# Patient Record
Sex: FEMALE | Race: WHITE | NOT HISPANIC OR LATINO | Employment: UNEMPLOYED | URBAN - METROPOLITAN AREA
[De-identification: names, ages, dates, MRNs, and addresses within clinical notes are randomized per-mention and may not be internally consistent; named-entity substitution may affect disease eponyms.]

---

## 2017-05-01 ENCOUNTER — TRANSFERRED RECORDS (OUTPATIENT)
Dept: HEALTH INFORMATION MANAGEMENT | Facility: CLINIC | Age: 47
End: 2017-05-01

## 2017-06-04 ENCOUNTER — TRANSFERRED RECORDS (OUTPATIENT)
Dept: HEALTH INFORMATION MANAGEMENT | Facility: HOSPITAL | Age: 47
End: 2017-06-04

## 2017-06-05 ENCOUNTER — HOSPITAL ENCOUNTER (INPATIENT)
Facility: HOSPITAL | Age: 47
LOS: 3 days | Discharge: HOME | DRG: 881 | End: 2017-06-08
Attending: PSYCHIATRY & NEUROLOGY | Admitting: PSYCHIATRY & NEUROLOGY
Payer: MEDICARE

## 2017-06-05 ENCOUNTER — TRANSFERRED RECORDS (OUTPATIENT)
Dept: HEALTH INFORMATION MANAGEMENT | Facility: HOSPITAL | Age: 47
End: 2017-06-05

## 2017-06-05 DIAGNOSIS — F33.41 RECURRENT MAJOR DEPRESSION IN PARTIAL REMISSION (H): ICD-10-CM

## 2017-06-05 DIAGNOSIS — M79.7 FIBROMYALGIA SYNDROME: ICD-10-CM

## 2017-06-05 DIAGNOSIS — F41.9 ANXIETY: Primary | ICD-10-CM

## 2017-06-05 PROBLEM — R45.851 SUICIDAL IDEATION: Status: ACTIVE | Noted: 2017-06-05

## 2017-06-05 PROCEDURE — A9270 NON-COVERED ITEM OR SERVICE: HCPCS | Mod: GY | Performed by: NURSE PRACTITIONER

## 2017-06-05 PROCEDURE — 12400000 ZZH R&B MH

## 2017-06-05 PROCEDURE — 25000132 ZZH RX MED GY IP 250 OP 250 PS 637: Mod: GY | Performed by: NURSE PRACTITIONER

## 2017-06-05 PROCEDURE — 99223 1ST HOSP IP/OBS HIGH 75: CPT | Mod: AI | Performed by: NURSE PRACTITIONER

## 2017-06-05 PROCEDURE — 25000125 ZZHC RX 250: Performed by: NURSE PRACTITIONER

## 2017-06-05 RX ORDER — ZINC SULFATE 50(220)MG
220 CAPSULE ORAL DAILY
Status: DISCONTINUED | OUTPATIENT
Start: 2017-06-05 | End: 2017-06-08 | Stop reason: HOSPADM

## 2017-06-05 RX ORDER — SUMATRIPTAN 25 MG/1
50 TABLET, FILM COATED ORAL ONCE
Status: DISCONTINUED | OUTPATIENT
Start: 2017-06-05 | End: 2017-06-05 | Stop reason: ALTCHOICE

## 2017-06-05 RX ORDER — LORAZEPAM 0.5 MG/1
0.5 TABLET ORAL AT BEDTIME
Status: DISCONTINUED | OUTPATIENT
Start: 2017-06-05 | End: 2017-06-08 | Stop reason: HOSPADM

## 2017-06-05 RX ORDER — CALCIUM CARBONATE 500 MG/1
1000 TABLET, CHEWABLE ORAL 2 TIMES DAILY WITH MEALS
Status: DISCONTINUED | OUTPATIENT
Start: 2017-06-05 | End: 2017-06-08 | Stop reason: HOSPADM

## 2017-06-05 RX ORDER — OLANZAPINE 10 MG/1
10 TABLET ORAL
Status: DISCONTINUED | OUTPATIENT
Start: 2017-06-05 | End: 2017-06-08 | Stop reason: HOSPADM

## 2017-06-05 RX ORDER — ACETAMINOPHEN 325 MG/1
975 TABLET ORAL EVERY 4 HOURS PRN
Status: DISCONTINUED | OUTPATIENT
Start: 2017-06-05 | End: 2017-06-08 | Stop reason: HOSPADM

## 2017-06-05 RX ORDER — DIPHENHYDRAMINE HCL 25 MG
25-50 CAPSULE ORAL EVERY 6 HOURS PRN
Status: DISCONTINUED | OUTPATIENT
Start: 2017-06-05 | End: 2017-06-07

## 2017-06-05 RX ORDER — HYDROCODONE BITARTRATE AND ACETAMINOPHEN 5; 325 MG/1; MG/1
.5-1 TABLET ORAL EVERY 6 HOURS PRN
Status: DISCONTINUED | OUTPATIENT
Start: 2017-06-05 | End: 2017-06-08 | Stop reason: HOSPADM

## 2017-06-05 RX ORDER — ASCORBIC ACID 500 MG
1000 TABLET ORAL 3 TIMES DAILY
Status: DISCONTINUED | OUTPATIENT
Start: 2017-06-05 | End: 2017-06-08 | Stop reason: HOSPADM

## 2017-06-05 RX ORDER — ALUMINA, MAGNESIA, AND SIMETHICONE 2400; 2400; 240 MG/30ML; MG/30ML; MG/30ML
30 SUSPENSION ORAL EVERY 4 HOURS PRN
Status: DISCONTINUED | OUTPATIENT
Start: 2017-06-05 | End: 2017-06-08 | Stop reason: HOSPADM

## 2017-06-05 RX ORDER — CYCLOBENZAPRINE HCL 5 MG
5 TABLET ORAL
Status: DISCONTINUED | OUTPATIENT
Start: 2017-06-05 | End: 2017-06-08 | Stop reason: HOSPADM

## 2017-06-05 RX ORDER — TRAZODONE HYDROCHLORIDE 50 MG/1
50 TABLET, FILM COATED ORAL
Status: DISCONTINUED | OUTPATIENT
Start: 2017-06-05 | End: 2017-06-08 | Stop reason: HOSPADM

## 2017-06-05 RX ORDER — ONDANSETRON 4 MG/1
8 TABLET, FILM COATED ORAL EVERY 8 HOURS PRN
Status: DISCONTINUED | OUTPATIENT
Start: 2017-06-05 | End: 2017-06-08 | Stop reason: HOSPADM

## 2017-06-05 RX ORDER — HYDROXYZINE HYDROCHLORIDE 25 MG/1
25-50 TABLET, FILM COATED ORAL EVERY 4 HOURS PRN
Status: DISCONTINUED | OUTPATIENT
Start: 2017-06-05 | End: 2017-06-05 | Stop reason: ALTCHOICE

## 2017-06-05 RX ORDER — OLANZAPINE 10 MG/2ML
10 INJECTION, POWDER, FOR SOLUTION INTRAMUSCULAR
Status: DISCONTINUED | OUTPATIENT
Start: 2017-06-05 | End: 2017-06-08 | Stop reason: HOSPADM

## 2017-06-05 RX ORDER — ONDANSETRON 8 MG/1
24 TABLET, FILM COATED ORAL EVERY 8 HOURS PRN
Status: DISCONTINUED | OUTPATIENT
Start: 2017-06-05 | End: 2017-06-05

## 2017-06-05 RX ORDER — POTASSIUM CHLORIDE 1500 MG/1
20 TABLET, EXTENDED RELEASE ORAL 2 TIMES DAILY
Status: DISCONTINUED | OUTPATIENT
Start: 2017-06-05 | End: 2017-06-08 | Stop reason: HOSPADM

## 2017-06-05 RX ORDER — LIDOCAINE 50 MG/G
1 PATCH TOPICAL EVERY 24 HOURS
Status: DISCONTINUED | OUTPATIENT
Start: 2017-06-05 | End: 2017-06-08 | Stop reason: HOSPADM

## 2017-06-05 RX ORDER — ACETAMINOPHEN 325 MG/1
650 TABLET ORAL EVERY 4 HOURS PRN
Status: DISCONTINUED | OUTPATIENT
Start: 2017-06-05 | End: 2017-06-05

## 2017-06-05 RX ORDER — PHENOL 1.4 %
1 AEROSOL, SPRAY (ML) MUCOUS MEMBRANE 2 TIMES DAILY WITH MEALS
COMMUNITY

## 2017-06-05 RX ORDER — DULOXETIN HYDROCHLORIDE 30 MG/1
30 CAPSULE, DELAYED RELEASE ORAL DAILY
Status: DISCONTINUED | OUTPATIENT
Start: 2017-06-05 | End: 2017-06-07

## 2017-06-05 RX ORDER — NALOXONE HYDROCHLORIDE 0.4 MG/ML
.1-.4 INJECTION, SOLUTION INTRAMUSCULAR; INTRAVENOUS; SUBCUTANEOUS
Status: DISCONTINUED | OUTPATIENT
Start: 2017-06-05 | End: 2017-06-08 | Stop reason: HOSPADM

## 2017-06-05 RX ORDER — POLYETHYLENE GLYCOL 3350 17 G/17G
17 POWDER, FOR SOLUTION ORAL 4 TIMES DAILY PRN
Status: DISCONTINUED | OUTPATIENT
Start: 2017-06-05 | End: 2017-06-08 | Stop reason: HOSPADM

## 2017-06-05 RX ORDER — SUMATRIPTAN 25 MG/1
50 TABLET, FILM COATED ORAL DAILY PRN
Status: DISCONTINUED | OUTPATIENT
Start: 2017-06-05 | End: 2017-06-08 | Stop reason: HOSPADM

## 2017-06-05 RX ORDER — LACTOSE-REDUCED FOOD
LIQUID (ML) ORAL
COMMUNITY

## 2017-06-05 RX ORDER — LACTOSE-REDUCED FOOD
1 LIQUID (ML) ORAL
Status: DISCONTINUED | OUTPATIENT
Start: 2017-06-05 | End: 2017-06-05

## 2017-06-05 RX ORDER — UREA 10 %
500 LOTION (ML) TOPICAL
COMMUNITY

## 2017-06-05 RX ORDER — ARIPIPRAZOLE 2 MG/1
1 TABLET ORAL DAILY
Status: ON HOLD | COMMUNITY
End: 2017-06-08

## 2017-06-05 RX ADMIN — MAGNESIUM HYDROXIDE 30 ML: 400 SUSPENSION ORAL at 16:54

## 2017-06-05 RX ADMIN — HYDROCODONE BITARTRATE AND ACETAMINOPHEN 1 TABLET: 5; 325 TABLET ORAL at 16:54

## 2017-06-05 RX ADMIN — CYCLOBENZAPRINE HYDROCHLORIDE 5 MG: 5 TABLET, FILM COATED ORAL at 20:57

## 2017-06-05 RX ADMIN — POTASSIUM CHLORIDE 20 MEQ: 20 TABLET, EXTENDED RELEASE ORAL at 14:16

## 2017-06-05 RX ADMIN — Medication 500 MCG: at 17:25

## 2017-06-05 RX ADMIN — CALCIUM CARBONATE (ANTACID) CHEW TAB 500 MG 1000 MG: 500 CHEW TAB at 17:21

## 2017-06-05 RX ADMIN — ONDANSETRON HYDROCHLORIDE 8 MG: 4 TABLET, FILM COATED ORAL at 18:33

## 2017-06-05 RX ADMIN — RANITIDINE HYDROCHLORIDE 150 MG: 150 TABLET, FILM COATED ORAL at 20:52

## 2017-06-05 RX ADMIN — LORAZEPAM 0.5 MG: 0.5 TABLET ORAL at 20:52

## 2017-06-05 RX ADMIN — DIPHENHYDRAMINE HYDROCHLORIDE 50 MG: 25 CAPSULE ORAL at 16:54

## 2017-06-05 RX ADMIN — ACETAMINOPHEN 975 MG: 325 TABLET, FILM COATED ORAL at 14:23

## 2017-06-05 RX ADMIN — CHOLECALCIFEROL CAP 125 MCG (5000 UNIT) 5000 UNITS: 125 CAP at 14:16

## 2017-06-05 RX ADMIN — OXYCODONE HYDROCHLORIDE AND ACETAMINOPHEN 1000 MG: 500 TABLET ORAL at 20:52

## 2017-06-05 RX ADMIN — LIDOCAINE 1 PATCH: 50 PATCH TOPICAL at 14:15

## 2017-06-05 RX ADMIN — OXYCODONE HYDROCHLORIDE AND ACETAMINOPHEN 1000 MG: 500 TABLET ORAL at 17:20

## 2017-06-05 RX ADMIN — RANITIDINE HYDROCHLORIDE 150 MG: 150 TABLET, FILM COATED ORAL at 14:17

## 2017-06-05 RX ADMIN — DULOXETINE HYDROCHLORIDE 30 MG: 30 CAPSULE, DELAYED RELEASE ORAL at 14:16

## 2017-06-05 RX ADMIN — POTASSIUM CHLORIDE 20 MEQ: 20 TABLET, EXTENDED RELEASE ORAL at 20:52

## 2017-06-05 RX ADMIN — ZINC SULFATE CAP 220 MG (50 MG ELEMENTAL ZN) 220 MG: 220 (50 ZN) CAP at 14:16

## 2017-06-05 ASSESSMENT — ACTIVITIES OF DAILY LIVING (ADL)
TOILETING: 0-->INDEPENDENT
GROOMING: INDEPENDENT
AMBULATION: 0-->INDEPENDENT
BATHING: 0-->INDEPENDENT
RETIRED_COMMUNICATION: 0-->UNDERSTANDS/COMMUNICATES WITHOUT DIFFICULTY
AMBULATION: 0-->INDEPENDENT
EATING: 0-->INDEPENDENT
DRESS: SCRUBS (BEHAVIORAL HEALTH);INDEPENDENT
BATHING: 0-->INDEPENDENT
ORAL_HYGIENE: INDEPENDENT
DRESS: INDEPENDENT;SCRUBS (BEHAVIORAL HEALTH)
FALL_HISTORY_WITHIN_LAST_SIX_MONTHS: NO
TRANSFERRING: 0-->INDEPENDENT
RETIRED_EATING: 0-->INDEPENDENT
COGNITION: 0 - NO COGNITION ISSUES REPORTED
DRESS: 0-->INDEPENDENT
SWALLOWING: 0-->SWALLOWS FOODS/LIQUIDS WITHOUT DIFFICULTY
CHANGE_IN_FUNCTIONAL_STATUS_SINCE_ONSET_OF_CURRENT_ILLNESS/INJURY: NO
COMMUNICATION: 0-->UNDERSTANDS/COMMUNICATES WITHOUT DIFFICULTY
TRANSFERRING: 0-->INDEPENDENT
GROOMING: INDEPENDENT;SHOWER
SWALLOWING: 0-->SWALLOWS FOODS/LIQUIDS WITHOUT DIFFICULTY
TOILETING: 0-->INDEPENDENT
DRESS: 0-->INDEPENDENT

## 2017-06-05 NOTE — PLAN OF CARE
Problem: Goal Outcome Summary  Goal: Goal Outcome Summary  Outcome: No Change  Patient admits depression, SI with a plan but will not share her plan. Denies HI, anxiety, hallucinations or delusions at this time. She states that she has chronic pain d/t her Crohn's disease as it is an autoimmune disease. She was tearful when she was talking about being depressed and her SI plan. Writer talked with her for a while and she stated that our talk helped and that she felt better. She stated that she was excited to be involved with our groups so she can learn about how to cope with depression and SI. Patient was calm, cooperative and thankful during admission. She picked out a book and got some ice water and then went back into her room.       Tatyana Dey  6/5/2017  7:43 AM

## 2017-06-05 NOTE — IP AVS SNAPSHOT
MRN:[id number]                      After Visit Summary   6/5/2017    Piper Yoder    MRN: [id number]           Thank you!     Thank you for choosing [facility] for your care. Our goal is always to provide you with excellent care. Hearing back from our patients is one way we can continue to improve our services. Please take a few minutes to complete the written survey that you may receive in the mail after you visit with us. Thank you!        Patient Information     Date Of Birth          1970        Designated Caregiver       Most Recent Value    Caregiver    Will someone help with your care after discharge? no      About your hospital stay     You were admitted on:  June 5, 2017 You last received care in the:  [facility]    You were discharged on:  June 8, 2017       Who to Call     For medical emergencies, please call 911.  For non-urgent questions about your medical care, please call your primary care provider or clinic, [phone]          Attending Provider     Provider Specialty    Tian Mcdonald MD Psychiatry    Kristin Moore NP Nurse Practitioner       Primary Care Provider Office Phone # Fax #    Ibrahima Nicole [phone]      Further instructions from your care team       Behavioral Discharge Planning and Instructions    Summary: Piper was admitted with depression and suicidal ideation with a plan.    Main Diagnosis: Major Depressive Episode, single episode, severe w/o psychosis, R/O PTSD, Chronic, R/O Personality Disorder, Borderline Features, Crohn's Disease.    Major Treatments, Procedures and Findings: Stabilize with medications, connect with community programs.     Symptoms to Report: feeling more aggressive, increased confusion, losing more sleep, mood getting worse or thoughts of suicide    Lifestyle Adjustment: Take all medications as prescribed, meet with doctor/ medication provider, out patient therapist, , and [facility] worker as  scheduled. Abstain from alcohol or any unprescribed drugs.     Psychiatry Follow-up:    [facility]  Medication Management - Charles FAIRCHILD - June 22, @ 12:40  BLAINE Grant - as arranged.  [address]  [place], [place]  Phone: [phone]  Fax: [phone]     [facility]  Individual Therapy - Starla Barksdale - June 19th, @ 3 pm and June 30th, @ 11 am.  [address] [place] [address]  Phone: [phone] ([facility])  [phone] ([facility])  Fax: [phone]  [facility]-  [address] [place] [address] [address]  [place] [address]  Tel: [phone]  Fax: [phone]    [facility]  PCP - Roderick Nicole - as needed.   [address] [place] [address]  Phone: [phone]    [facility]   - [place] [phone]  [address] [facility] [address]  Phone: [phone]  Fax: [phone] or [phone]    Resources:   Crisis Intervention: 991.887.9491 or 158-468-7262 (TTY: 983.736.3803).  Call anytime for help.  National [place] on Mental Illness (www.[place].joshua.org): 732.472.4282 or 217-811-5331.  Alcoholics Anonymous (www.alcoholics-anonymous.org): Check your phone book for your local chapter.  Suicide Awareness Voices of Education (SAVE) (www.save.org): 802-674-CWQA (6139)  National Suicide Prevention Line (www.mentalhealthmn.org): 908-744-WUED (9597)  Mental Health Consumer/Survivor Network of [place] (www.mhcsn.net): 371.550.1263 or 493-873-5326  Mental Health Association of [place] (www.mentalhealth.org): 705.824.1816 or 120-491-1715    General Medication Instructions:   See your medication sheet(s) for instructions.   Take all medicines as directed.  Make no changes unless your doctor suggests them.   Go to all your doctor visits.  Be sure to have all your required lab tests. This way, your medicines can be refilled on time.  Do not use  "any drugs not prescribed by your doctor.  Avoid alcohol.    [place] Area:  [facility], Crisis stabilization housing- 724.934.9426  [facility] Crisis Line: 1-539.404.2369  Adv[facility]ates For Family Peace: 599-0466  Sexual Assault Program of [facility]: 587.348.4461 or 1-831.373.3667  [place] Battered Women's Program: 1-938.845.2097 Ext: 279       Calls answered Mon-Fri-8:00 am--4:30 pm    [place]:  [facility] for Family Peace: 1-471.122.2337  [facility] first call for help: 1-810.774.5028  [facility]:  (151) 871-2063      [place] Area:  Warm Line: 1-736.527.1372       Calls answered Tuesday--Saturday 4:00 pm--10:00 pm  [facility] Crisis Line - 911.229.9229  [place] [facility] 489-642-2664    [place] Statewide:  [place] Crisis and Referral Services: 1-229.154.3600  National Suicide Prevention Lifeline: 4-231-866-TALK (4457)   - myn5uwyq- Text  Life  to 435[address]  First Call for Help: 2-1-1  [place] Helpline- 7-582-UBRB-HELP     Pending Results     No orders found from 6/3/2017 to 6/6/2017.            Statement of Approval     Ordered          06/08/17 1201  I have reviewed and agree with all the recommendations and orders detailed in this document.  EFFECTIVE NOW     Approved and electronically signed by:  Kristin Moore NP             Admission Information     Date & Time Provider Department Dept. Phone    6/5/2017 Kristin Moore NP [facility] [phone]      Your Vitals Were     Blood Pressure Pulse Temperature Respirations Height Weight    102/59 71 96.8  F (36  C) (Tympanic) 14 1.676 m (5' 6\") 62 kg (136 lb 9.6 oz)    Pulse Oximetry BMI (Body Mass Index)                99% 22.05 kg/m2          MyChart Information     [facility] lets you send messages to your doctor, view your test results, renew your prescriptions, schedule appointments and more. To sign up, go to [email or web address] . Click on \"Log in\" on the left side of the screen, which will take you to the " "Welcome page. Then click on \"Sign up Now\" on the right side of the page.     You will be asked to enter the access code listed below, as well as some personal information. Please follow the directions to create your username and password.     Your access code is: [email or web address]  Expires: 2017 12:10 PM     Your access code will  in 90 days. If you need help or a new code, please call your [facility] clinic or [phone].        Care EveryWhere ID     This is your Care EveryWhere ID. This could be used by other organizations to access your [facility] medical records  [id number]           Review of your medicines      START taking        Dose / Directions    cyclobenzaprine 5 MG tablet   Commonly known as:  FLEXERIL   Used for:  Fibromyalgia syndrome        Dose:  5 mg   Take 1 tablet (5 mg) by mouth nightly as needed for muscle spasms   Quantity:  30 tablet   Refills:  0       DULoxetine HCl 40 MG Cpep   Used for:  Recurrent major depression in partial remission (H)        Dose:  40 mg   Take 40 mg by mouth daily   Quantity:  30 capsule   Refills:  0         CONTINUE these medicines which may have CHANGED, or have new prescriptions. If we are uncertain of the size of tablets/capsules you have at home, strength may be listed as something that might have changed.        Dose / Directions    HYDROcodone-acetaminophen 5-325 MG per tablet   Commonly known as:  NORCO   This may have changed:  additional instructions   Used for:  Abdominal pain, epigastric        Dose:  1 tablet   Take 1 tablet by mouth every 6 hours as needed for moderate to severe pain   Quantity:  12 tablet   Refills:  0       LORazepam 0.5 MG tablet   Commonly known as:  ATIVAN   This may have changed:  medication strength   Used for:  Anxiety        Dose:  0.5 mg   Take 1 tablet (0.5 mg) by mouth daily as needed for anxiety   Quantity:  30 tablet   Refills:  0       polyethylene glycol powder   Commonly known as:  MIRALAX   This may have changed: "  additional instructions   Used for:  Chronic constipation        Dose:  17 g   Take 17 g by mouth daily STIR 1 CAPFUL (17GM) IN 8 OZ OF LIQUID AND DRINK   Quantity:  527 g   Refills:  0         CONTINUE these medicines which have NOT CHANGED        Dose / Directions    calcium carbonate 600 MG tablet   Commonly known as:  OS-TAYLOR 600 mg [place]. Ca        Dose:  1 tablet   Take 1 tablet by mouth 2 times daily (with meals)   Refills:  0       cholecalciferol 97545 UNITS capsule   Commonly known as:  vitamin D3        Dose:  1 capsule   Take 1 capsule by mouth daily   Refills:  0       cyanocolbalamin 500 MCG tablet   Commonly known as:  vitamin  B-12        Dose:  500 mcg   Take 500 mcg by mouth 2 times daily   Refills:  0       ENSURE PLUS Liqd        Take by mouth 3 times daily (with meals)   Refills:  0       ondansetron 8 MG tablet   Commonly known as:  ZOFRAN   Used for:  Fibromyalgia syndrome        Dose:  24 mg   Take 3 tablets (24 mg) by mouth every 8 hours as needed for nausea Place on all labels pt needs an appointment for further refills please schedule.   Quantity:  36 tablet   Refills:  5       POTASSIUM CHLORIDE ER PO        Dose:  20 mEq   Take 20 mEq by mouth 2 times daily   Refills:  0       RANITIDINE HCL PO        Dose:  150 mg   Take 150 mg by mouth 2 times daily   Refills:  0       SUMATRIPTAN SUCCINATE PO        Dose:  50 mg   Take 50 mg by mouth once Take 50 mg (1 tablet) at onset of headache, may repeat in 2 hours if needed   Refills:  0       VITAMIN C ER PO        Dose:  1000 mg   Take 1,000 mg by mouth 3 times daily   Refills:  0       ZINC PO        Dose:  60 mg   Take 60 mg by mouth daily   Refills:  0         STOP taking     ABILIFY 2 MG tablet   Generic drug:  ARIPiprazole                Where to get your medicines      These medications were sent to [id number] - [place], [place] - [address]  [address], [place] [address]     Phone:  [phone]      cyclobenzaprine 5 MG tablet    DULoxetine HCl 40 MG Cpep         Some of these will need a paper prescription and others can be bought over the counter. Ask your nurse if you have questions.     Bring a paper prescription for each of these medications     LORazepam 0.5 MG tablet                Protect others around you: Learn how to safely use, store and throw away your medicines at www.disposemymeds.org.             Medication List: This is a list of all your medications and when to take them. Check marks below indicate your daily home schedule. Keep this list as a reference.      Medications           Morning Afternoon Evening Bedtime As Needed    calcium carbonate 600 MG tablet   Commonly known as:  OS-TAYLOR 600 mg [place]. Ca   Take 1 tablet by mouth 2 times daily (with meals)                                cholecalciferol 91912 UNITS capsule   Commonly known as:  vitamin D3   Take 1 capsule by mouth daily   Last time this was given:  5,000 Units on 6/8/2017  8:13 AM                                cyanocolbalamin 500 MCG tablet   Commonly known as:  vitamin  B-12   Take 500 mcg by mouth 2 times daily                                cyclobenzaprine 5 MG tablet   Commonly known as:  FLEXERIL   Take 1 tablet (5 mg) by mouth nightly as needed for muscle spasms   Last time this was given:  5 mg on 6/5/2017  8:57 PM                                DULoxetine HCl 40 MG Cpep   Take 40 mg by mouth daily   Last time this was given:  40 mg on 6/8/2017  8:12 AM                                ENSURE PLUS Liqd   Take by mouth 3 times daily (with meals)                                HYDROcodone-acetaminophen 5-325 MG per tablet   Commonly known as:  NORCO   Take 1 tablet by mouth every 6 hours as needed for moderate to severe pain   Last time this was given:  1 tablet on 6/7/2017  2:53 PM                                LORazepam 0.5 MG tablet   Commonly known as:  ATIVAN   Take 1 tablet (0.5 mg) by mouth daily as needed for anxiety    Last time this was given:  0.5 mg on 6/7/2017  9:59 PM                                ondansetron 8 MG tablet   Commonly known as:  ZOFRAN   Take 3 tablets (24 mg) by mouth every 8 hours as needed for nausea Place on all labels pt needs an appointment for further refills please schedule.   Last time this was given:  8 mg on 6/5/2017  6:33 PM                                polyethylene glycol powder   Commonly known as:  MIRALAX   Take 17 g by mouth daily STIR 1 CAPFUL (17GM) IN 8 OZ OF LIQUID AND DRINK                                POTASSIUM CHLORIDE ER PO   Take 20 mEq by mouth 2 times daily                                RANITIDINE HCL PO   Take 150 mg by mouth 2 times daily   Last time this was given:  150 mg on 6/8/2017  8:13 AM                                SUMATRIPTAN SUCCINATE PO   Take 50 mg by mouth once Take 50 mg (1 tablet) at onset of headache, may repeat in 2 hours if needed   Last time this was given:  50 mg on 6/6/2017 11:54 PM                                VITAMIN C ER PO   Take 1,000 mg by mouth 3 times daily                                ZINC PO   Take 60 mg by mouth daily

## 2017-06-05 NOTE — PROVIDER NOTIFICATION
06/05/17 1054   Patient Belongings   Did you bring any home meds/supplements to the hospital?  No   Patient Belongings clothing;suitcase   Disposition of Belongings belonging room   Belongings Search Yes   Clothing Search Yes   Second Staff Arthur ORTIZ     List items sent to safe: n/a  All other belongings put in assigned cubby in belongings room including: Stark flannel pants, button down shirt, 2 pairs black knit pants, black,grey,pink,grey tank tops, knit sweater which is blue, white, purple, horizontal stripes,green shorts, black, grey  Shorts, black & white leoporad print knit pants, black slippers, 5 pairs socks range in color from pink,blue & black, 2 pairs underwear tan & blue,2 plastic bags of personal hygiene products, 5 bottles of aromatherapy oils, electric tooth brush, burgundy suitcase bag,1 small bag of floss picks, peach & black night gown, 5 under wire bras  Grey & black, burgundy, navy blue, black & brown leoporad print.     I have reviewed my belongings list on admission and verify that it is correct.     Patient signature_______________________________    Second staff witness (if patient unable to sign) ______________________________       I have received all my belongings at discharge.    Patient signature________________________________    [place]  6/5/2017  10:55 AM    Bucket of Essential oils, hospital blanket white & blue, light blue jacket with zipper & strings, light grey jacket with camp, camouflage tank top, Aromatherapy necklace(silver), Cash 17.92. 1 pair tan socks, tan bra, grey knit pants, knit black button up sweater with a camp.

## 2017-06-05 NOTE — PROGRESS NOTES
"Pt referred via nutrition indicator list with reduced oral intake.  46 yof admitted with suicidal ideation.  Hx notes:  complicated hx of Crohn's disease with resection and at one time required TPN x 6 months, fibromyalgia, vitamin D deficiency.   Pt follows at [facility] for Crohn's disease.     Ht-66\",  Wt-137#,  IBWR is 117-143#.  Weight hx notes loss of 21# over the past year (this is during the time pt was on TPN 3/2016 to 9/2016).      Labs from [facility] reviewed.  Pt is taking vitamin/mineral supplements including:  Vitamin D, B12, KCL, vitamin C, zinc.      Regular diet with Ensure Enlive 3x/day is ordered.  Pt has consumed 75% two meals offered thus far.      Continue current nutritional interventions.  RD will monitor intake and weights.    "
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
## 2017-06-05 NOTE — PLAN OF CARE
Problem: Discharge Planning  Goal: Discharge Planning (Adult, OB, Behavioral, Peds)  Left a voice mail for pt's  [place] with [facility]- [phone]

## 2017-06-05 NOTE — PLAN OF CARE
Face to face end of shift report received from JONES Argueta. Rounding completed. Patient observed.     Dilcia Fischer  6/5/2017  4:27 PM

## 2017-06-05 NOTE — PLAN OF CARE
"Problem: Goal Outcome Summary  Goal: Goal Outcome Summary  Pt has been up and about on the unit since admit this morning at approximately 0700. Pt has been calm and cooperative with assessments and has attended groups. Affect blunted and flat. Reports increased depression and continued suicidal thoughts with plan--which she still refuses to communicate to staff. Pt does contract for safety on the unit. Pt with c/o \"all over\" pain, rated 7/10 and described as \"aching\" and \"burning in my stomach.\" Lidoderm patch applied to pt's low back (per request). PRN APAP 975 mg PO given at approximately 1430 with fair effect.     Problem: Suicide Risk (Adult)  Goal: Strength-Based Wellness/Recovery  Patient will demonstrate the desired outcomes by discharge/transition of care.    Pt will attend at least 50% of groups and participate in unit milieu.  Pt will deny SI by discharge.   Outcome: Improving  Pt continues to report suicidal thoughts with a plan--but still will not disclose what her plan is. Pt has attended groups this shift and has participated in the unit milieu.  Goal: Physical Safety  Patient will demonstrate the desired outcomes by discharge/transition of care.    Pt will remain free from self-harm and/or injury during hospital stay.   Outcome: Improving  Pt has remained free from self-harm and/or injury this shift.      "

## 2017-06-05 NOTE — PLAN OF CARE
Face to face end of shift report received from OSIEL Dey RN. Rounding completed. Patient observed, up on the unit and in the lounge.     Ellie Shaikh  6/5/2017  7:49 AM

## 2017-06-05 NOTE — IP AVS SNAPSHOT
[facility]    [address] [place] [address]    Phone:  [phone]    Fax:  [phone]                                       After Visit Summary   6/5/2017    Piper Yoder    MRN: [id number]           After Visit Summary Signature Page     I have received my discharge instructions, and my questions have been answered. I have discussed any challenges I see with this plan with the nurse or doctor.    ..........................................................................................................................................  Patient/Patient Representative Signature      ..........................................................................................................................................  Patient Representative Print Name and Relationship to Patient    ..................................................               ................................................  Date                                            Time    ..........................................................................................................................................  Reviewed by Signature/Title    ...................................................              ..............................................  Date                                                            Time
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
## 2017-06-06 PROCEDURE — A9270 NON-COVERED ITEM OR SERVICE: HCPCS | Mod: GY | Performed by: NURSE PRACTITIONER

## 2017-06-06 PROCEDURE — 25000132 ZZH RX MED GY IP 250 OP 250 PS 637: Mod: GY | Performed by: NURSE PRACTITIONER

## 2017-06-06 PROCEDURE — 12400000 ZZH R&B MH

## 2017-06-06 RX ADMIN — ACETAMINOPHEN 975 MG: 325 TABLET, FILM COATED ORAL at 12:11

## 2017-06-06 RX ADMIN — OXYCODONE HYDROCHLORIDE AND ACETAMINOPHEN 1000 MG: 500 TABLET ORAL at 21:26

## 2017-06-06 RX ADMIN — RANITIDINE HYDROCHLORIDE 150 MG: 150 TABLET, FILM COATED ORAL at 08:44

## 2017-06-06 RX ADMIN — OXYCODONE HYDROCHLORIDE AND ACETAMINOPHEN 1000 MG: 500 TABLET ORAL at 08:44

## 2017-06-06 RX ADMIN — POTASSIUM CHLORIDE 20 MEQ: 20 TABLET, EXTENDED RELEASE ORAL at 08:44

## 2017-06-06 RX ADMIN — LORAZEPAM 0.5 MG: 0.5 TABLET ORAL at 21:26

## 2017-06-06 RX ADMIN — POTASSIUM CHLORIDE 20 MEQ: 20 TABLET, EXTENDED RELEASE ORAL at 21:26

## 2017-06-06 RX ADMIN — DIPHENHYDRAMINE HYDROCHLORIDE 25 MG: 25 CAPSULE ORAL at 12:11

## 2017-06-06 RX ADMIN — CALCIUM CARBONATE (ANTACID) CHEW TAB 500 MG 1000 MG: 500 CHEW TAB at 16:49

## 2017-06-06 RX ADMIN — CALCIUM CARBONATE (ANTACID) CHEW TAB 500 MG 1000 MG: 500 CHEW TAB at 08:43

## 2017-06-06 RX ADMIN — Medication 500 MCG: at 08:44

## 2017-06-06 RX ADMIN — LIDOCAINE 1 PATCH: 50 PATCH TOPICAL at 08:47

## 2017-06-06 RX ADMIN — Medication 500 MCG: at 16:49

## 2017-06-06 RX ADMIN — ZINC SULFATE CAP 220 MG (50 MG ELEMENTAL ZN) 220 MG: 220 (50 ZN) CAP at 08:43

## 2017-06-06 RX ADMIN — RANITIDINE HYDROCHLORIDE 150 MG: 150 TABLET, FILM COATED ORAL at 21:26

## 2017-06-06 RX ADMIN — CHOLECALCIFEROL CAP 125 MCG (5000 UNIT) 5000 UNITS: 125 CAP at 08:44

## 2017-06-06 RX ADMIN — DULOXETINE HYDROCHLORIDE 30 MG: 30 CAPSULE, DELAYED RELEASE ORAL at 08:43

## 2017-06-06 RX ADMIN — SUMATRIPTAN 50 MG: 25 TABLET, FILM COATED ORAL at 23:54

## 2017-06-06 RX ADMIN — OXYCODONE HYDROCHLORIDE AND ACETAMINOPHEN 1000 MG: 500 TABLET ORAL at 14:25

## 2017-06-06 ASSESSMENT — ACTIVITIES OF DAILY LIVING (ADL)
ORAL_HYGIENE: INDEPENDENT
GROOMING: INDEPENDENT
GROOMING: INDEPENDENT
DRESS: SCRUBS (BEHAVIORAL HEALTH)
DRESS: SCRUBS (BEHAVIORAL HEALTH);INDEPENDENT
ORAL_HYGIENE: INDEPENDENT

## 2017-06-06 NOTE — PLAN OF CARE
Problem: Goal Outcome Summary  Goal: Goal Outcome Summary  Outcome: Improving  Pt has been cooperative and using aromatherapy which pt states helps soothe her  Pt states has no depression at present and states never heard voices  Admits to eating as being a struggle with digestive issues  States cymbalta may help with eating  States patch on lower back helpful Fleeting suicidal thoughts        Problem: Suicide Risk (Adult)  Goal: Strength-Based Wellness/Recovery  Patient will demonstrate the desired outcomes by discharge/transition of care.    Pt will attend at least 50% of groups and participate in unit milieu.  Pt will deny SI by discharge.   Outcome: Improving  Pt states still having fleeting suicidal ideation  Attending groups and states really enjoys them    Goal: Physical Safety  Patient will demonstrate the desired outcomes by discharge/transition of care.    Pt will remain free from self-harm and/or injury during hospital stay.   Outcome: Improving  Pt has shown no self harm

## 2017-06-06 NOTE — PLAN OF CARE
Problem: Goal Outcome Summary  Goal: Goal Outcome Summary  Pt appears to be sleeping in bed with eyes closed and regular respirations. 15 minutes and PRN safety checks completed with no noted complains. Pt got up once during the night and colored in dayroom for an hour then returned to bed. Will continue to monitor.      JACOB SÁNCHEZ  6/6/2017  5:33 AM

## 2017-06-06 NOTE — PLAN OF CARE
"Problem: Goal Outcome Summary  Goal: Goal Outcome Summary  Pt has been up on unit and attending group. Does let staff know needs. At start of shift, pt noted to be sitting on floor in hallway, c/o rectal pain and requested medications. Given PRN Norco and Benadryl per request, but pt then stated \"I want the whole enchilada,\" and said she also needed Flexeril. Writer explained that it is written as PRN at , pt irritated by this. Given MOM also, as she just had BM and reported constipation. Denied current SI, HI and hallucinations, rated anxiety and depression both 6/10. Pt said her \"plan\" when feeling suicidal is not something available on this unit, but also does not want to share what the \"plan\" is. Pt said she has been having separation anxiety for past 'couple weeks,' has had panic attacks when her fiance falls asleep or goes anywhere because he is her \"link to the outside world.\" Discussed her traumatic recent stay in [facility] facility and that they were happy to discharge her because she was a \"trouble maker.\" Pt stated she has PTSD as a result of this recent stay. Did shower at the start of shift, ate 50% of dinner.   1830 - Requested and given Zofran for mild nausea.   2105 - Requested and given Flexeril at this time.   Problem: Suicide Risk (Adult)  Goal: Strength-Based Wellness/Recovery  Patient will demonstrate the desired outcomes by discharge/transition of care.    Pt will attend at least 50% of groups and participate in unit milieu.  Pt will deny SI by discharge.   Pt is attending some groups this evening, does report no SI currently.   Goal: Physical Safety  Patient will demonstrate the desired outcomes by discharge/transition of care.    Pt will remain free from self-harm and/or injury during hospital stay.   Outcome: Therapy, progress toward functional goals as expected  Remains injury-free, denied SI today.      "

## 2017-06-06 NOTE — PLAN OF CARE
Face to face end of shift report received from JONES Ha. Rounding completed. Patient observed resting in bed.     JACOB SÁNCHEZ  6/5/2017  11:56 PM

## 2017-06-06 NOTE — PLAN OF CARE
"Problem: Goal Outcome Summary  Goal: Goal Outcome Summary  Outcome: Improving  Pt attended all groups today. She is pleasant and cooperative today. She is using her essential oils appropriately for her Crohn's disease. Pt requested and received prn tylenol and benadryl to \"nip it in the bud\" regarding her crohn's as well. She says that this helps decrease her symptoms along with her alternative therapies. Pt has not showered yet today. 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     Problem: Suicide Risk (Adult)  Goal: Strength-Based Wellness/Recovery  Patient will demonstrate the desired outcomes by discharge/transition of care.    Pt will attend at least 50% of groups and participate in unit milieu.  Pt will deny SI by discharge.   Outcome: Improving  Pt denies any SI or thoughts of SIB. She attended all groups today.   Goal: Physical Safety  Patient will demonstrate the desired outcomes by discharge/transition of care.    Pt will remain free from self-harm and/or injury during hospital stay.   Outcome: Improving  Pt remains safe on the unit.       "

## 2017-06-06 NOTE — PLAN OF CARE
Face to face end of shift report received from Brook RN Rounding completed. Patient observed.     Heide Meraz  6/6/2017  1600 PM

## 2017-06-06 NOTE — PLAN OF CARE
Face to face end of shift report received from Any HICKEY RN. Rounding completed. Patient observed in the [facility] [place].     Brook Lobato  6/6/2017  10:25 AM

## 2017-06-07 PROCEDURE — 99232 SBSQ HOSP IP/OBS MODERATE 35: CPT | Performed by: NURSE PRACTITIONER

## 2017-06-07 PROCEDURE — 25000132 ZZH RX MED GY IP 250 OP 250 PS 637: Mod: GY | Performed by: NURSE PRACTITIONER

## 2017-06-07 PROCEDURE — 12400000 ZZH R&B MH

## 2017-06-07 PROCEDURE — A9270 NON-COVERED ITEM OR SERVICE: HCPCS | Mod: GY | Performed by: NURSE PRACTITIONER

## 2017-06-07 RX ORDER — DIPHENHYDRAMINE HCL 25 MG
25-50 CAPSULE ORAL EVERY 4 HOURS PRN
Status: DISCONTINUED | OUTPATIENT
Start: 2017-06-07 | End: 2017-06-08 | Stop reason: HOSPADM

## 2017-06-07 RX ORDER — DULOXETIN HYDROCHLORIDE 20 MG/1
40 CAPSULE, DELAYED RELEASE ORAL DAILY
Status: DISCONTINUED | OUTPATIENT
Start: 2017-06-08 | End: 2017-06-08 | Stop reason: HOSPADM

## 2017-06-07 RX ADMIN — Medication 500 MCG: at 16:51

## 2017-06-07 RX ADMIN — CHOLECALCIFEROL CAP 125 MCG (5000 UNIT) 5000 UNITS: 125 CAP at 10:05

## 2017-06-07 RX ADMIN — POTASSIUM CHLORIDE 20 MEQ: 20 TABLET, EXTENDED RELEASE ORAL at 21:59

## 2017-06-07 RX ADMIN — ACETAMINOPHEN 975 MG: 325 TABLET, FILM COATED ORAL at 14:39

## 2017-06-07 RX ADMIN — OXYCODONE HYDROCHLORIDE AND ACETAMINOPHEN 1000 MG: 500 TABLET ORAL at 21:59

## 2017-06-07 RX ADMIN — DULOXETINE HYDROCHLORIDE 30 MG: 30 CAPSULE, DELAYED RELEASE ORAL at 10:05

## 2017-06-07 RX ADMIN — RANITIDINE HYDROCHLORIDE 150 MG: 150 TABLET, FILM COATED ORAL at 21:59

## 2017-06-07 RX ADMIN — OXYCODONE HYDROCHLORIDE AND ACETAMINOPHEN 1000 MG: 500 TABLET ORAL at 10:05

## 2017-06-07 RX ADMIN — ZINC SULFATE CAP 220 MG (50 MG ELEMENTAL ZN) 220 MG: 220 (50 ZN) CAP at 10:05

## 2017-06-07 RX ADMIN — DIPHENHYDRAMINE HYDROCHLORIDE 50 MG: 25 CAPSULE ORAL at 12:30

## 2017-06-07 RX ADMIN — HYDROCODONE BITARTRATE AND ACETAMINOPHEN 1 TABLET: 5; 325 TABLET ORAL at 14:53

## 2017-06-07 RX ADMIN — LIDOCAINE 1 PATCH: 50 PATCH TOPICAL at 10:09

## 2017-06-07 RX ADMIN — RANITIDINE HYDROCHLORIDE 150 MG: 150 TABLET, FILM COATED ORAL at 10:05

## 2017-06-07 RX ADMIN — POTASSIUM CHLORIDE 20 MEQ: 20 TABLET, EXTENDED RELEASE ORAL at 10:05

## 2017-06-07 RX ADMIN — LORAZEPAM 0.5 MG: 0.5 TABLET ORAL at 21:59

## 2017-06-07 RX ADMIN — DIPHENHYDRAMINE HYDROCHLORIDE 50 MG: 25 CAPSULE ORAL at 17:01

## 2017-06-07 RX ADMIN — CALCIUM CARBONATE (ANTACID) CHEW TAB 500 MG 1000 MG: 500 CHEW TAB at 10:04

## 2017-06-07 RX ADMIN — POLYETHYLENE GLYCOL 3350 17 G: 17 POWDER, FOR SOLUTION ORAL at 12:25

## 2017-06-07 RX ADMIN — CALCIUM CARBONATE (ANTACID) CHEW TAB 500 MG 1000 MG: 500 CHEW TAB at 16:51

## 2017-06-07 RX ADMIN — OXYCODONE HYDROCHLORIDE AND ACETAMINOPHEN 1000 MG: 500 TABLET ORAL at 14:37

## 2017-06-07 ASSESSMENT — ACTIVITIES OF DAILY LIVING (ADL)
GROOMING: INDEPENDENT
LAUNDRY: UNABLE TO COMPLETE
ORAL_HYGIENE: INDEPENDENT
ORAL_HYGIENE: INDEPENDENT
DRESS: INDEPENDENT;SCRUBS (BEHAVIORAL HEALTH)
GROOMING: INDEPENDENT;SHOWER
DRESS: SCRUBS (BEHAVIORAL HEALTH);INDEPENDENT

## 2017-06-07 NOTE — PLAN OF CARE
"Problem: Goal Outcome Summary  Goal: Goal Outcome Summary  Outcome: Declining  Pt upset, sad, and making statements of hopelessness today. She received a phone call from her significant other today and afterwards came to the nurse's station requesting a prn ativan that she does not have ordered. Pt accepted prn benadryl 50 mg at 1230 for anxiety. She also requested miralax at that time. Pt has been using her essential oils for anxiety and her crohn's discomfort. Pt is upset about her CM. She said the CM offers her no help and she wouldn't \"have even let her in my house this past month if it weren't for completing the CADI assessment.\" Pt tells this writer that she wants an increase in her PCA hours saying, \"I've taken care of myself too long and I'm tired. I need someone to take care of me now.\" Pt tearful throughout conversation. She has been attending groups and does well.     2:58 PM  Pt requested and received prn tylenol for c/o rectal pain. Pt then upset about \"not getting my shower supplied soon enough\" and requested and received prn norco at 1453 for c/o \"8/10\" rectal pain. Pt said that after she has a BM she needs to shower soon to decrease the swelling. Will continue to monitor.     Problem: Suicide Risk (Adult)  Goal: Strength-Based Wellness/Recovery  Patient will demonstrate the desired outcomes by discharge/transition of care.    Pt will attend at least 50% of groups and participate in unit milieu.  Pt will deny SI by discharge.   Outcome: Therapy, progress toward functional goals is gradual  Pt has attended most groups and been participatory today. She does report feeling \"suicidaly.\"   Goal: Physical Safety  Patient will demonstrate the desired outcomes by discharge/transition of care.    Pt will remain free from self-harm and/or injury during hospital stay.   Outcome: Declining  Pt reporting today that with anxiety she has suicidal thoughts and she complained of \"high anxiety\" today. She remains safe " on the unit and contracts for safety.

## 2017-06-07 NOTE — PLAN OF CARE
"Problem: Discharge Planning  Goal: Discharge Planning (Adult, OB, Behavioral, Peds)  Writer spoke with pt's care coordinator with [facility] about pt's providers- provided her with contact information for her county .     Received a message from pt's  Virginia Corderolouise [phone] ext. [phone] that pt was not happy with the increase in her PCA hours to 8 hours a day and to tell pt she has the right to appeal the decision.     Writer spoke with pt who was upset because she feels like she needs 24 hour in-home care because whenever she is left home alone her anxiety becomes overwhelming and she becomes suicidal.  She is unhappy that her  will not help her with this.  She states she has been taking care of herself for years and she is tired and wants someone to take care of her. She stated she does not want to but she might have to go into assisted living. Writer discussed the crisis hotlines and pt stated that they were no help that when she has called them they get inpatient with her and tell her they have other callers that they need to answer who are \"really suicidal.\"  Pt stated she wants in- home nursing care.  Writer advised her to ask her PCP to see if he will put in an order for home nursing care. Writer discussed DBT therapy or partial hospitalization and pt stated that she was supposed to do an intake with [facility] for a day program which is 3 days a week.  Writer told her we will set her up with that program when she leaves and make a referral to [facility] to help with finding resources.     Writer spoke with pt's  and let her know that pt is requesting skilled nursing to set up her medications- she directed writer to call [facility] who provides pt's PCA services to see if they do skilled nursing care. Writer also let her know that pt stated that she might want to go into assisted living.  The  will look into that for the " "pt.     Writer called [facility] [phone] and inquired about skilled nursing care. Was told they do not do those services but would have Jossy call back.     3:17pm: Received another call from pt's  Virginia who stated that [facility] did not provide skilled nursing care but [facility] [phone] did and that she gave them writer's contact information. She also requested that pt leave with her medication because \"she cannot take generic meds.\"  "

## 2017-06-07 NOTE — PLAN OF CARE
Face to face end of shift report received from Brook FLORIAN RN. Rounding completed. Patient observed in room laying in bed.     Lissy Carreno  6/7/2017  4:38 PM

## 2017-06-07 NOTE — PLAN OF CARE
Face to face end of shift report received from JONES Jaeger. Rounding completed. Patient observed in dayroom.     JACOB SÁNCHEZ  6/7/2017  12:28 AM

## 2017-06-07 NOTE — PLAN OF CARE
Face to face end of shift report received from Any HICKEY RN. Rounding completed. Patient observed in her room.     Brook Lobato  6/7/2017  8:23 AM

## 2017-06-07 NOTE — PLAN OF CARE
Problem: Goal Outcome Summary  Goal: Goal Outcome Summary  Patient c/o migraine at 2354, Imitrex 50 mg given with effectiveness. Patient slept well until 0330 where she came to nurse station and requested to talk to this writer. Pt verbalized concerns regarding cares provided at home and that she feels her  is not helping her. States that she appears well at times because she tries to be independent as much as she can and for that people think she does not need the help.  Also states that boyfriend is her PCA and that he at times do not understand her symptoms and her illness. Nurse encouraged to discuss discharge concerns to . This nurse discussed with client the possibility of her being discharged to an inpatient facility if treatment team thinks is appropriate. Patient verbalized that she believes would not be necessary but she would like more services at home. Reports having suicidal thoughts all the time but contracted for safety while here and states that essential oils, groups and having people with her all the time helped. States she does not like to have suicidal thoughts all the time and denied talking about her suicidal plan. Nurse asked client if she knows of other services in her community, like home care and outpatient therapy, as one of her problems was her [facility]  declining need for more PCA hours. Patient stated that she didn't know of services available and if her CADI would pay for them.  Pt thanked writer and stayed in dayroom writing, later patient began rocking back and forth covering eyes for a while.   0530- pt walked to room and went back to bed.   0600- Pt appears sleeping.

## 2017-06-08 VITALS
SYSTOLIC BLOOD PRESSURE: 102 MMHG | OXYGEN SATURATION: 99 % | TEMPERATURE: 96.8 F | DIASTOLIC BLOOD PRESSURE: 59 MMHG | HEIGHT: 66 IN | HEART RATE: 71 BPM | WEIGHT: 136.6 LBS | RESPIRATION RATE: 14 BRPM | BODY MASS INDEX: 21.95 KG/M2

## 2017-06-08 PROCEDURE — A9270 NON-COVERED ITEM OR SERVICE: HCPCS | Mod: GY | Performed by: NURSE PRACTITIONER

## 2017-06-08 PROCEDURE — 99239 HOSP IP/OBS DSCHRG MGMT >30: CPT | Performed by: NURSE PRACTITIONER

## 2017-06-08 PROCEDURE — 25000132 ZZH RX MED GY IP 250 OP 250 PS 637: Mod: GY | Performed by: NURSE PRACTITIONER

## 2017-06-08 RX ORDER — LORAZEPAM 0.5 MG/1
0.5 TABLET ORAL DAILY PRN
Qty: 30 TABLET | Refills: 0 | Status: SHIPPED | OUTPATIENT
Start: 2017-06-08 | End: 2019-11-04

## 2017-06-08 RX ORDER — CYCLOBENZAPRINE HCL 5 MG
5 TABLET ORAL
Qty: 30 TABLET | Refills: 0 | Status: SHIPPED | OUTPATIENT
Start: 2017-06-08 | End: 2019-11-04

## 2017-06-08 RX ORDER — DULOXETINE 40 MG/1
40 CAPSULE, DELAYED RELEASE ORAL DAILY
Qty: 30 CAPSULE | Refills: 0 | Status: SHIPPED | OUTPATIENT
Start: 2017-06-08 | End: 2019-11-04

## 2017-06-08 RX ADMIN — OXYCODONE HYDROCHLORIDE AND ACETAMINOPHEN 1000 MG: 500 TABLET ORAL at 13:11

## 2017-06-08 RX ADMIN — RANITIDINE HYDROCHLORIDE 150 MG: 150 TABLET, FILM COATED ORAL at 08:13

## 2017-06-08 RX ADMIN — OXYCODONE HYDROCHLORIDE AND ACETAMINOPHEN 1000 MG: 500 TABLET ORAL at 08:27

## 2017-06-08 RX ADMIN — ZINC SULFATE CAP 220 MG (50 MG ELEMENTAL ZN) 220 MG: 220 (50 ZN) CAP at 08:12

## 2017-06-08 RX ADMIN — Medication 500 MCG: at 08:12

## 2017-06-08 RX ADMIN — CALCIUM CARBONATE (ANTACID) CHEW TAB 500 MG 1000 MG: 500 CHEW TAB at 08:12

## 2017-06-08 RX ADMIN — CHOLECALCIFEROL CAP 125 MCG (5000 UNIT) 5000 UNITS: 125 CAP at 08:13

## 2017-06-08 RX ADMIN — ACETAMINOPHEN 975 MG: 325 TABLET, FILM COATED ORAL at 13:12

## 2017-06-08 RX ADMIN — LIDOCAINE 1 PATCH: 50 PATCH TOPICAL at 09:43

## 2017-06-08 RX ADMIN — DULOXETINE HYDROCHLORIDE 40 MG: 20 CAPSULE, DELAYED RELEASE ORAL at 08:12

## 2017-06-08 RX ADMIN — DIPHENHYDRAMINE HYDROCHLORIDE 50 MG: 25 CAPSULE ORAL at 13:12

## 2017-06-08 RX ADMIN — POTASSIUM CHLORIDE 20 MEQ: 20 TABLET, EXTENDED RELEASE ORAL at 08:12

## 2017-06-08 RX ADMIN — POLYETHYLENE GLYCOL 3350 17 G: 17 POWDER, FOR SOLUTION ORAL at 09:43

## 2017-06-08 ASSESSMENT — ACTIVITIES OF DAILY LIVING (ADL)
ORAL_HYGIENE: INDEPENDENT
GROOMING: INDEPENDENT
LAUNDRY: UNABLE TO COMPLETE
DRESS: SCRUBS (BEHAVIORAL HEALTH)

## 2017-06-08 NOTE — PLAN OF CARE
"Problem: Goal Outcome Summary  Goal: Goal Outcome Summary  Outcome: Therapy, progress towards functional goals is fair  Patient denies SI, HI, soni., depression and anxiety. She admits to pain in her lower back 4/10. She states that she does not want any medications for this right now. Patient states that she wishes she could use heat and \"that's why I want to leave today\". Patient states that she feels this hospital has done a good job of setting her up with medications. [facility], socializing with peers. Will continue to monitor.   0943: Patient requested, and received, PRN miralax at this time.  1300: Patient states that she removed her lidoderm patch and threw it in her garbage. Unable to locate patch.    Problem: Suicide Risk (Adult)  Intervention: Assess Risk to Self/Maintain Safety  Patient denies SI. She contracts for safety.     Goal: Strength-Based Wellness/Recovery  Patient will demonstrate the desired outcomes by discharge/transition of care.    Pt will attend at least 50% of groups and participate in unit milieu.  Pt will deny SI by discharge.   Outcome: Improving  Patient denies SI. She agrees to notify staff if she starts to feel like she is going to harm herself.   Goal: Physical Safety  Patient will demonstrate the desired outcomes by discharge/transition of care.    Pt will remain free from self-harm and/or injury during hospital stay.   Outcome: Improving  Patient has not had any injuries or episodes of self harm. She contracts for safety.      "

## 2017-06-08 NOTE — PROGRESS NOTES
"[facility]  Psychiatric Progress Note      Impression:   This is a 46 year old yo female with suicidal ideation and a plan that she will not reveal. Symptoms triggered by Crohn's disease and ongoing issues.    Patient has been admitted 3 days and still meets hospital inpatient criteria.     Piper is in her room when I go to speak with her. She had reportedly signed a 12 hour intent to discharge form and was requesting to leave. When I go to speak to her about this she tells me that she is not getting any help for her physical complaints. We discussed that she is currently on a mental health unit to focus on depression and SI, though states \"well how am I supposed to feel any better mentally when I don't feel well physically?\" She reports that she is upset that we do not have a bathtub for her to use and that we do not allow heating pads on the unit. She states that she is not able to attend groups when she is struggling with pain, which won't be relieved until she is able to use heat. She states that she would rather go home where she has access to these things. She denies any active suicidal ideation, does admit to ongoing depression though states that she feels she will be depressed until her pain is completely relieved. She did start Cymbalta 3 days ago and has been tolerating this well. She is in agreement to stay until tomorrow so that the dose can be increased to 40 mg daily. We will also be able to ensure that she has appropriate follow-up appointments in place prior to her leaving.     Educated regarding medication indications, risks, benefits, side effects, contraindications and possible interactions. Verbally expressed understanding.        DIagnoses:   Major Depressive Episode, single episode, severe w/o psychosis  R/O PTSD, Chronic  R/O Personality Disorder, Borderline Features  Crohn's Disease      Attestation:  Patient has been seen and evaluated by me,  Kristin Moore NP          " Interim History:   The patient's care was discussed with the treatment team and chart notes were reviewed.          Medications:     Current Facility-Administered Medications Ordered in Epic   Medication Dose Route Frequency Last Rate Last Dose     [START ON 6/8/2017] DULoxetine (CYMBALTA) EC capsule 40 mg  40 mg Oral Daily         diphenhydrAMINE (BENADRYL) capsule 25-50 mg  25-50 mg Oral Q4H PRN   50 mg at 06/07/17 1701     alum & mag hydroxide-simethicone (MYLANTA ES/MAALOX  ES) suspension 30 mL  30 mL Oral Q4H PRN         magnesium hydroxide (MILK OF MAGNESIA) suspension 30 mL  30 mL Oral At Bedtime PRN   30 mL at 06/05/17 1654     traZODone (DESYREL) tablet 50 mg  50 mg Oral At Bedtime PRN         OLANZapine (zyPREXA) tablet 10 mg  10 mg Oral Q2H PRN        Or     OLANZapine (zyPREXA) injection 10 mg  10 mg Intramuscular Q2H PRN         nicotine polacrilex (NICORETTE) gum 2-4 mg  2-4 mg Buccal Q1H PRN         ascorbic acid (VITAMIN C) tablet 1,000 mg  1,000 mg Oral TID   1,000 mg at 06/07/17 1437     calcium carbonate (TUMS) chewable tablet 1,000 mg  1,000 mg Oral BID w/meals   1,000 mg at 06/07/17 1651     cholecalciferol (vitamin D3) capsule CAPS 5,000 Units  5,000 Units Oral Daily   5,000 Units at 06/07/17 1005     cyanocobalamin sublingual tablet 500 mcg  500 mcg Sublingual BID   500 mcg at 06/07/17 1651     HYDROcodone-acetaminophen (NORCO) 5-325 MG per tablet 0.5-1 tablet  0.5-1 tablet Oral Q6H PRN   1 tablet at 06/07/17 1453     LORazepam (ATIVAN) tablet 0.5 mg  0.5 mg Oral At Bedtime   0.5 mg at 06/06/17 2126     zinc sulfate (ZINCATE) capsule 220 mg  220 mg Oral Daily   220 mg at 06/07/17 1005     polyethylene glycol (MIRALAX/GLYCOLAX) Packet 17 g  17 g Oral 4x Daily PRN   17 g at 06/07/17 1225     potassium chloride SA (K-DUR/KLOR-CON M) CR tablet 20 mEq  20 mEq Oral BID   20 mEq at 06/07/17 1005     ranitidine (ZANTAC) tablet 150 mg  150 mg Oral BID   150 mg at 06/07/17 1005     lidocaine (LIDODERM)  "5 % Patch 1 patch  1 patch Transdermal Q24H   1 patch at 06/07/17 1009     lidocaine (LIDODERM) patch REMOVAL   Transdermal Q24H         lidocaine (LIDODERM) patch in PLACE   Transdermal Q8H         cyclobenzaprine (FLEXERIL) tablet 5 mg  5 mg Oral At Bedtime PRN   5 mg at 06/05/17 2057     acetaminophen (TYLENOL) tablet 975 mg  975 mg Oral Q4H PRN   975 mg at 06/07/17 1439     naloxone (NARCAN) injection 0.1-0.4 mg  0.1-0.4 mg Intravenous Q2 Min PRN         ondansetron (ZOFRAN) tablet 8 mg  8 mg Oral Q8H PRN   8 mg at 06/05/17 1833     SUMAtriptan (IMITREX) tablet 50 mg  50 mg Oral Daily PRN   50 mg at 06/06/17 4344     No current Epic-ordered outpatient prescriptions on file.              10 point ROS reviewed- positive for pain related to crohns       Allergies:     Allergies   Allergen Reactions     Chloraprep One Step      Diazepam      Suicidal thoughts, and major depression  Pt tolerates lorazepam     Erythromycin Nausea     She doesn't like the way she feels when she is on it.     Flagyl [Metronidazole Hcl] Other (See Comments)     headache     Humira Swelling     Facial swelling and just didn't feel good     Morphine [Fumaric Acid] Other (See Comments)     Pt states \"doesn't work and makes her miserable\"     Oxycodone      Pt reports \"it doesn't work and I just don't like the way I feel when I take it\"     Percocet [Oxycodone-Acetaminophen] Nausea     Pt doesn't like it- causes nausea     Vancomycin      Droperidol Anxiety     Prochlorperazine Anxiety     Risperdal Anxiety     Risperidone Anxiety     Other reaction(s): Nausea Only            Psychiatric Examination:   /57  Pulse 64  Temp 97.7  F (36.5  C) (Tympanic)  Resp 14  Ht 1.676 m (5' 6\")  Wt 62 kg (136 lb 9.6 oz)  SpO2 98%  BMI 22.05 kg/m2  Weight is 136 lbs 9.6 oz  Body mass index is 22.05 kg/(m^2).    Appearance:  awake, alert, adequately groomed and dressed in hospital scrubs  Attitude:  cooperative  Eye Contact:  good  Mood:  anxious " and depressed  Affect:  appropriate and in normal range  Speech:  clear, coherent  Psychomotor Behavior:  no evidence of tardive dyskinesia, dystonia, or tics  Thought Process:  logical, linear and goal oriented  Associations:  no loose associations  Thought Content:  no evidence of psychotic thought and no auditory hallucinations present, occasional passive suicidal thoughts with no reported plan  Insight:  limited  Judgment:  limited  Oriented to:  time, person, and place  Attention Span and Concentration:  intact  Recent and Remote Memory:  intact  Fund of Knowledge: appropriate  Muscle Strength and Tone: normal  Gait and Station: Normal           Labs:   No results found for this or any previous visit (from the past 48 hour(s)).           Plan:   Increase Cymbalta to 40 mg daily tomorrow  Likely d/c home tomorrow    Updates on the treatment plan:    Symptoms that must improve before discharge: anxiety and depression    Med changes targeting these symptoms: increase cymbalta tomorrow    Aftercare needs identified (Day Tx, CD Tx, IRTS, etc.): med management, therapy, interested in day treatment    Expected discharge date: tomorrow  Goal(s) met: denies active SI    Goal(s) to continue: improve mood further    New Goal(s):

## 2017-06-08 NOTE — PLAN OF CARE
Discharge Note    Patient Discharged to home on 6/8/2017 1:49 PM via taxi accompanied by discharge planner.     Patient informed of discharge instructions in AVS. patient verbalizes understanding and denies having any questions pertaining to AVS. Patient stable at time of discharge. Patient denies SI, HI, and thoughts of self harm at time of discharge. All personal belongings returned to patient. Discharge prescriptions sent to [facility] in [place] via electronic communication. Psych evaluation, history and physical, AVS, and discharge summary faxed to next level of care- by social workers.     Savanna Armendariz  6/8/2017  1:50 PM

## 2017-06-08 NOTE — DISCHARGE INSTRUCTIONS
Behavioral Discharge Planning and Instructions    Summary: Piper was admitted with depression and suicidal ideation with a plan.    Main Diagnosis: Major Depressive Episode, single episode, severe w/o psychosis, R/O PTSD, Chronic, R/O Personality Disorder, Borderline Features, Crohn's Disease.    Major Treatments, Procedures and Findings: Stabilize with medications, connect with community programs.     Symptoms to Report: feeling more aggressive, increased confusion, losing more sleep, mood getting worse or thoughts of suicide    Lifestyle Adjustment: Take all medications as prescribed, meet with doctor/ medication provider, out patient therapist, , and [facility] worker as scheduled. Abstain from alcohol or any unprescribed drugs.     Psychiatry Follow-up:    [facility]  Medication Management - Charles FAIRCHILD - June 22, @ 12:40  [facility] - Juanita - as arranged.  [address]  [place], [place]  Phone: [phone]  Fax: [phone]     [facility]  Individual Therapy - Starla Barksdale - June 19th, @ 3 pm and June 30th, @ 11 am.  [address] [place] [address]  Phone: [phone] ([facility])  [phone] ([facility])  Fax: [phone]  [facility]-  [address] [place] [address] [address]  [place] [address]  Tel: [phone]  Fax: [phone]    [facility]  PCP - Roderick Nicole - as needed.   [address] [place] [address]  Phone: [phone]    [facility]   - [place] Land [phone]  [address] [place] [address] [address] [facility] [address]  Phone: [phone]  Fax: [phone] or [phone]    Resources:   Crisis Intervention: 529.516.4333 or 494-109-8515 (TTY: 517.958.9431).  Call anytime for help.  National [place] on Mental Illness (www.[place].joshua.org): 700.937.7677 or 299-495-8282.  Alcoholics Anonymous (www.alcoholics-anonymous.org): Check  your phone book for your local chapter.  Suicide Awareness Voices of Education (SAVE) (www.save.org): 322-617-NFHH (3160)  National Suicide Prevention Line (www.mentalhealthmn.org): 963-118-YMER (1429)  Mental Health Consumer/Survivor Network of [place] (www.mhcsn.net): 737.278.6354 or 726-176-4529  Mental Health Association of [place] (www.mentalhealth.org): 198.349.5114 or 462-224-0782    General Medication Instructions:   See your medication sheet(s) for instructions.   Take all medicines as directed.  Make no changes unless your doctor suggests them.   Go to all your doctor visits.  Be sure to have all your required lab tests. This way, your medicines can be refilled on time.  Do not use any drugs not prescribed by your doctor.  Avoid alcohol.    [place] Area:  [facility] Crisis stabilization [facility]- [phone]  [facility] Crisis Line: 1-854.308.1713  [facility] Family [facility]: 937-6957  Sexual Assault Program [facility]: 439.418.5609 or 1-570.759.3785  [facility] Women[facility] Program: 1-258.682.8288 Ext: 279       Calls answered Mon-Fri-8:00 am--4:30 pm    [place]:  [facility] Family Peace: 1-327.406.5864  [facility] first call for help: 7-227-263-4204  [facility] Crisis Center:  (380) 593-1317      [place] Area:  Warm Line: 1-763.444.5362       Calls answered Tuesday--Saturday 4:00 pm--10:00 pm  [facility] Crisis Line - 146.775.3637  [place] [place] [facility] 309-610-1230    [place] Statewide:  [place] Crisis and Referral Services: 1-365.162.3857  National Suicide Prevention Lifeline: 7-600-422-TALK (0558)   - quq2rxas- Text  Life  to 91561  First Call for Help: 2-1-1  [place] Helpline- 5-491-PTNZ-HELP

## 2017-06-08 NOTE — PLAN OF CARE
Problem: Goal Outcome Summary  Goal: Goal Outcome Summary  Pt up in lobby socializing with another pt at the beginning of the shift. Pt went to bed around 0100 and appeared to be sleeping after that, normal respirations and position changes noted.

## 2017-06-08 NOTE — PLAN OF CARE
Face to face end of shift report received from Amee LEACH RN. Rounding completed. Patient observed.     Savanna Armendariz  6/8/2017  7:48 AM

## 2017-06-08 NOTE — PLAN OF CARE
Face to face end of shift report received from Evelyn GOLDMAN. Rounding completed. Patient observed in lobby.     Amee Bashir  6/7/2017  11:44 PM

## 2017-06-08 NOTE — PLAN OF CARE
"Problem: Goal Outcome Summary  Goal: Goal Outcome Summary  Outcome: No Change  Upon initial assessment pt requested to sign a 12 hour intent to leave. Pt was given form which she signed and provider was informed. PT states great frustration with the rules, policies and how slowly she believes staff move around here. Pt states an anger that she has to remember things here such as \"I should be offered my mirlax every morning incase I need it, I shouldn't have to ask for it\" nurse asked if she would like it scheduled in the morning then instead of it being as needed. Pt states \"no, I should be asked every morning if I want it.\" pt stated \"I took a shower and my lidocaine patch got wet and feel off, I shouldn't have to remember that I have a patch on, someone should take it off for me before I take a shower\" nurse explained to patient the importance of self responsibility. Pt c/o the \"toxic\" products we have here and her irritation with not being able to have her \"natural\" products in her room. Pt is encouraged to stay but states she needs to leave to take a bath at home to decrease her pain. Pt states she needs a heating pad or a hot bath and cold does not decrease her pain. Pt did speak with provider and decided to stay until tomorrow morning when she would be able to schedule a ride home. PT reports anxiety 6/10 she did receive a Benadryl at 1701  Pt up for dinner and out of her room for the rest of the evening. Pt participated in group throughout the evening. Pt is calm, and cooperative. Pt reports the benadryl worked very well to keep her anxiety at bay. Pt is currently watching TV in the [facility] area.     Problem: Suicide Risk (Adult)  Goal: Strength-Based Wellness/Recovery  Patient will demonstrate the desired outcomes by discharge/transition of care.    Pt will attend at least 50% of groups and participate in unit milieu.  Pt will deny SI by discharge.   Outcome: Improving  Pt denies SI  But does not attend " groups this shift.   Goal: Physical Safety  Patient will demonstrate the desired outcomes by discharge/transition of care.    Pt will remain free from self-harm and/or injury during hospital stay.   Outcome: Improving  Pt free from self harm this shift.

## 2017-06-08 NOTE — PLAN OF CARE
Problem: Discharge Planning  Goal: Discharge Planning (Adult, OB, Behavioral, Peds)  Pt is discharging at the recommendation of the treatment team. Pt is discharging to home transported by medical cab.  Pt denies having any thoughts of hurting themself or anyone else. Pt denies anxiety or depression. Pt has follow up with [facility]. Discharge instructions, including; demographic sheet, psychiatric evaluation, discharge summary, and AVS were faxed to these next level of care providers.  Social work team: Provide updates including:    Legal status (need to sign in, petition for commitment, etc.): Pt admitted under a voluntary status    Collateral history from family, , etc.: Per  [place]ia Mleendez with [facility]    Progress toward ideal aftercare plan: Significant progress    Follow up medical appointments made and added to AVS: Yes: [facility]    Obstacles to Discharge: None: is discharging

## 2017-06-08 NOTE — PLAN OF CARE
Problem: Goal Outcome Summary  Goal: Goal Outcome Summary  Treatment team care conference held 06/08/17     Attendees:Nursing, , Occupational Therapy, NP and Discharge Planner     AMANUEL and Mansi LEGER: Gives report including:    Group attendance: Yes: attends group and participates.    Ryan observation: Yes: interacts with staff and peers.    Interactions with others: Yes: Interacts with staff and peers.    Observed symptoms: cooperative and pleasant.

## 2017-06-09 NOTE — DISCHARGE SUMMARY
Psychiatric Discharge Summary    Piper Yoder MRN# [id number]   Age: 46 year old YOB: 1970     Date of Admission:  6/5/2017  Date of Discharge:  6/8/2017  Admitting Physician:  Tian Mcdonald MD  Discharge Physician:  Kristin Moore CNP         Event Leading to Hospitalization and Hospital Stay   Piper Yoder is a 46 year old female who presented via [place] [facility] with reports of increasing suicidal ideation with a plan over the last several months secondary to significant crohn's symptoms. History of depression, anxiety and crohn's disease. Reported feeling like a burden and not taking her psych medications. Had been admitted to [facility] at the end of May but did not follow through with outpatient psychiatric care.     Piper was admitted to the behavioral health unit as a voluntary patient. Her mood and anxiety did improve throughout her admission. She did participate in groups and was visible in the milieu.  She remained fixated on her physical symptoms related to Crohn's disease throughout her stay. Any discussion about her mental health and mood would quickly be diverted to focus on her pain. She did work with OT and utilized alpha stimulation and aromatherapy to help with anxiety and pain. She did sign a 12 hour intent form on 6/7/17, reporting that she was not happy about the restriction of no heating pads on the unit and that the unit did not have a bathtub she could utilize.  She was agreeable to stay until 6/8/17 so that Cymbalta could be increased further. Cymbalta was started on admission to target symptoms of depression as well as pain. She denied any notable side effects and tolerated this well. Outpatient mental health services had been put in place after her last discharge and she will be reminded of these appointments prior to discharge. She is denying suicidal ideation on day of discharge.           At time of discharge, there is no evidence that patient is in immediate danger of  self or others.        DIagnoses:     Major Depressive Episode, single episode, severe w/o psychosis  R/O PTSD, Chronic  R/O Personality Disorder, Borderline Features  Crohn's Disease         Labs:   No results found for this or any previous visit (from the past 48 hour(s)).               Discharge Medications:     Discharge Medication List as of 6/8/2017 12:10 PM      START taking these medications    Details   DULoxetine 40 MG CPEP Take 40 mg by mouth daily, Disp-30 capsule, R-0, E-Prescribe      cyclobenzaprine (FLEXERIL) 5 MG tablet Take 1 tablet (5 mg) by mouth nightly as needed for muscle spasms, Disp-30 tablet, R-0, E-Prescribe         CONTINUE these medications which have CHANGED    Details   LORazepam (ATIVAN) 0.5 MG tablet Take 1 tablet (0.5 mg) by mouth daily as needed for anxiety, Disp-30 tablet, R-0, Local Print         CONTINUE these medications which have NOT CHANGED    Details   cholecalciferol (VITAMIN D3) 77965 UNITS capsule Take 1 capsule by mouth daily, Historical      RANITIDINE HCL PO Take 150 mg by mouth 2 times daily, Historical      calcium carbonate (OS-TAYLOR 600 MG [place]. CA) 600 MG tablet Take 1 tablet by mouth 2 times daily (with meals), Historical      Nutritional Supplements (ENSURE PLUS) LIQD Take by mouth 3 times daily (with meals), Historical      cyanocolbalamin (VITAMIN  B-12) 500 MCG tablet Take 500 mcg by mouth 2 times daily, Historical      POTASSIUM CHLORIDE ER PO Take 20 mEq by mouth 2 times daily, Historical      Multiple Vitamins-Minerals (ZINC PO) Take 60 mg by mouth daily, Historical      SUMATRIPTAN SUCCINATE PO Take 50 mg by mouth once Take 50 mg (1 tablet) at onset of headache, may repeat in 2 hours if needed, Historical      polyethylene glycol (MIRALAX) powder Take 17 g by mouth daily STIR 1 CAPFUL (17GM) IN 8 OZ OF LIQUID AND DRINK, Disp-527 g, R-0, FaxDue for office visit.      ondansetron (ZOFRAN) 8 MG tablet Take 3 tablets (24 mg) by mouth every 8 hours as needed  for nausea Place on all labels pt needs an appointment for further refills please schedule., Disp-36 tablet, R-5, Denied      HYDROcodone-acetaminophen (NORCO) 5-325 MG per tablet Take 1 tablet by mouth every 6 hours as needed for moderate to severe pain, Disp-12 tablet, R-0, Local Print      Ascorbic Acid (VITAMIN C ER PO) Take 1,000 mg by mouth 3 times daily , Historical         STOP taking these medications       ARIPiprazole (ABILIFY) 2 MG tablet Comments:   Reason for Stopping:                 Justification for dual anti-psychotic use: not applicable       Psychiatric Examination:   Appearance:  awake, alert, adequately groomed and appeared as age stated  Attitude:  cooperative  Eye Contact:  good  Mood:  better, mild depression related to chronic pain  Affect:  appropriate and in normal range  Speech:  clear, coherent  Psychomotor Behavior:  no evidence of tardive dyskinesia, dystonia, or tics  Thought Process:  logical, linear and goal oriented  Associations:  no loose associations  Thought Content:  no evidence of suicidal ideation or homicidal ideation and no evidence of psychotic thought  Insight:  limited  Judgment:  limited  Oriented to:  time, person, and place  Attention Span and Concentration:  fair  Recent and Remote Memory:  intact  Fund of Knowledge: appropriate  Muscle Strength and Tone: normal  Gait and Station: Normal         Discharge Plan:   Psychiatry Follow-up:     [facility] - Charles FA[facility]CH[facility] - June 22, @ 12:40  BLAINE brito.  [address] [place] [address] [address]  [place], [place]  Phone: [phone]  Fax: [phone]      [facility]  Individual Therapy - Starla Barksdale - June 19th, @ 3 pm and June 30th, @ 11 am.  [address] [place] [address]  Phone: [phone] ([facility])  [phone] ([facility])  Fax: [phone]  [facility]-  [address] [place] [address] [address]  [place]  [address]  Tel: [phone]  Fax: [phone]     [facility]  PCP - Roderick Nicole - as needed.   [address] [place] [address]  Phone: [phone]     [facility]   - [place] [phone]  [address] [address]  Phone: [phone]  Fax: [phone] or [phone]    Attestation:  The patient has been seen and evaluated by me,  Kristin Moore NP         Discharge Services Provided:    40 minutes spent on discharge services, including:  Final examination of patient.  Review and discussion of Hospital stay.  Instructions for continued outpatient care/goals.  Preparation of discharge records.  Preparation of medications refills and new prescriptions.

## 2017-06-25 ENCOUNTER — TRANSFERRED RECORDS (OUTPATIENT)
Dept: HEALTH INFORMATION MANAGEMENT | Facility: CLINIC | Age: 47
End: 2017-06-25

## 2018-04-06 ENCOUNTER — TRANSFERRED RECORDS (OUTPATIENT)
Dept: HEALTH INFORMATION MANAGEMENT | Facility: CLINIC | Age: 48
End: 2018-04-06

## 2018-07-19 ENCOUNTER — TRANSFERRED RECORDS (OUTPATIENT)
Dept: HEALTH INFORMATION MANAGEMENT | Facility: CLINIC | Age: 48
End: 2018-07-19

## 2018-07-20 ENCOUNTER — TRANSFERRED RECORDS (OUTPATIENT)
Dept: HEALTH INFORMATION MANAGEMENT | Facility: CLINIC | Age: 48
End: 2018-07-20

## 2018-08-15 ENCOUNTER — TRANSFERRED RECORDS (OUTPATIENT)
Dept: HEALTH INFORMATION MANAGEMENT | Facility: CLINIC | Age: 48
End: 2018-08-15

## 2018-08-23 ENCOUNTER — OFFICE VISIT (OUTPATIENT)
Dept: FAMILY MEDICINE | Facility: CLINIC | Age: 48
End: 2018-08-23
Payer: MEDICARE

## 2018-08-23 VITALS
TEMPERATURE: 98.1 F | OXYGEN SATURATION: 98 % | BODY MASS INDEX: 20.22 KG/M2 | HEIGHT: 68 IN | SYSTOLIC BLOOD PRESSURE: 118 MMHG | WEIGHT: 133.4 LBS | HEART RATE: 82 BPM | DIASTOLIC BLOOD PRESSURE: 72 MMHG

## 2018-08-23 DIAGNOSIS — Z11.3 SCREEN FOR STD (SEXUALLY TRANSMITTED DISEASE): ICD-10-CM

## 2018-08-23 DIAGNOSIS — N89.8 VAGINAL IRRITATION: Primary | ICD-10-CM

## 2018-08-23 DIAGNOSIS — Z12.4 CERVICAL CANCER SCREENING: ICD-10-CM

## 2018-08-23 LAB
HCV AB SERPL QL IA: NONREACTIVE
HIV 1+2 AB+HIV1 P24 AG SERPL QL IA: NONREACTIVE
SPECIMEN SOURCE: ABNORMAL
WET PREP SPEC: ABNORMAL

## 2018-08-23 PROCEDURE — 87529 HSV DNA AMP PROBE: CPT | Performed by: NURSE PRACTITIONER

## 2018-08-23 PROCEDURE — 87491 CHLMYD TRACH DNA AMP PROBE: CPT | Performed by: NURSE PRACTITIONER

## 2018-08-23 PROCEDURE — 99213 OFFICE O/P EST LOW 20 MIN: CPT | Performed by: NURSE PRACTITIONER

## 2018-08-23 PROCEDURE — 87591 N.GONORRHOEAE DNA AMP PROB: CPT | Performed by: NURSE PRACTITIONER

## 2018-08-23 PROCEDURE — 86780 TREPONEMA PALLIDUM: CPT | Performed by: NURSE PRACTITIONER

## 2018-08-23 PROCEDURE — 87529 HSV DNA AMP PROBE: CPT | Mod: 91 | Performed by: NURSE PRACTITIONER

## 2018-08-23 PROCEDURE — 36415 COLL VENOUS BLD VENIPUNCTURE: CPT | Performed by: NURSE PRACTITIONER

## 2018-08-23 PROCEDURE — 87210 SMEAR WET MOUNT SALINE/INK: CPT | Performed by: NURSE PRACTITIONER

## 2018-08-23 PROCEDURE — G0476 HPV COMBO ASSAY CA SCREEN: HCPCS | Performed by: NURSE PRACTITIONER

## 2018-08-23 PROCEDURE — 87389 HIV-1 AG W/HIV-1&-2 AB AG IA: CPT | Performed by: NURSE PRACTITIONER

## 2018-08-23 PROCEDURE — 86803 HEPATITIS C AB TEST: CPT | Performed by: NURSE PRACTITIONER

## 2018-08-23 PROCEDURE — G0145 SCR C/V CYTO,THINLAYER,RESCR: HCPCS | Performed by: NURSE PRACTITIONER

## 2018-08-23 RX ORDER — FLUCONAZOLE 150 MG/1
TABLET ORAL
Qty: 2 TABLET | Refills: 0 | Status: SHIPPED | OUTPATIENT
Start: 2018-08-23 | End: 2019-11-04

## 2018-08-23 RX ORDER — METRONIDAZOLE 7.5 MG/G
1 GEL VAGINAL AT BEDTIME
Qty: 70 G | Refills: 0 | Status: SHIPPED | OUTPATIENT
Start: 2018-08-23 | End: 2018-08-28

## 2018-08-23 RX ORDER — LECITHIN 1200 MG
2 CAPSULE ORAL
COMMUNITY

## 2018-08-23 NOTE — MR AVS SNAPSHOT
After Visit Summary   8/23/2018    Piper Long    MRN: [id number]           Patient Information     Date Of Birth          1970        Visit Information        Provider Department      8/23/2018 6:40 AM Tiff Paz APRN CNP [facility]        Today's Diagnoses     Vaginal irritation    -  1    Screen for STD (sexually transmitted disease)        Cervical cancer screening           Follow-ups after your visit        Follow-up notes from your care team     Return if symptoms worsen or fail to improve.      Who to contact     If you have questions or need follow up information about today's clinic visit or your schedule please contact [facility] directly at [phone].  Normal or non-critical lab and imaging results will be communicated to you by MyChart, letter or phone within 4 business days after the clinic has received the results. If you do not hear from us within 7 days, please contact the clinic through [facility] or phone. If you have a critical or abnormal lab result, we will notify you by phone as soon as possible.  Submit refill requests through [employer] or call your pharmacy and they will forward the refill request to us. Please allow 3 business days for your refill to be completed.          Additional Information About Your Visit        MyChart Information     [employer] gives you secure access to your electronic health record. If you see a primary care provider, you can also send messages to your care team and make appointments. If you have questions, please call your primary care clinic.  If you do not have a primary care provider, please call [phone] and they will assist you.        Care EveryWhere ID     This is your Care EveryWhere ID. This could be used by other organizations to access your [place] medical records  [id number]        Your Vitals Were     Pulse Temperature Height Pulse Oximetry BMI (Body Mass Index)       82 98.1  F (36.7  " C) (Tympanic) 5' 7.5\" (1.715 m) 98% 20.59 kg/m2        Blood Pressure from Last 3 Encounters:   08/23/18 118/72   10/02/15 113/77   09/23/15 111/73    Weight from Last 3 Encounters:   08/23/18 133 lb 6.4 oz (60.5 kg)   09/23/15 146 lb 6.4 oz (66.4 kg)   09/11/15 149 lb (67.6 kg)              We Performed the Following     Chlamydia trachomatis PCR     Hepatitis C antibody     HIV Antigen Antibody Combo     HSV 1 and 2 DNA by PCR     Neisseria gonorrhoeae PCR     Pap imaged thin layer screen with HPV - recommended age 30 - 65 years (select HPV order below)     Treponema Abs w Reflex to RPR and Titer     Wet prep          Today's Medication Changes          These changes are accurate as of 8/23/18  9:22 AM.  If you have any questions, ask your nurse or doctor.               Start taking these medicines.        Dose/Directions    fluconazole 150 MG tablet   Commonly known as:  DIFLUCAN   Used for:  Vaginal irritation   Started by:  Tiff Paz APRN CNP        Take 1 tablet today, repeat treatment in 48-72 hrs   Quantity:  2 tablet   Refills:  0       metroNIDAZOLE 0.75 % vaginal gel   Commonly known as:  METROGEL   Used for:  Vaginal irritation   Started by:  Tiff Paz APRN CNP        Dose:  1 applicator   Place 1 applicator (5 g) vaginally At Bedtime for 5 days   Quantity:  70 g   Refills:  0         These medicines have changed or have updated prescriptions.        Dose/Directions    HYDROcodone-acetaminophen 5-325 MG per tablet   Commonly known as:  NORCO   This may have changed:  additional instructions   Used for:  Abdominal pain, epigastric        Dose:  1 tablet   Take 1 tablet by mouth every 6 hours as needed for moderate to severe pain   Quantity:  12 tablet   Refills:  0       polyethylene glycol powder   Commonly known as:  MIRALAX   This may have changed:  additional instructions   Used for:  Chronic constipation        Dose:  17 g   Take 17 g by mouth daily STIR 1 CAPFUL (17GM) " IN 8 OZ OF LIQUID AND DRINK   Quantity:  527 g   Refills:  0            Where to get your medicines      These medications were sent to [id number] - [place], [place] - [address]  [address], [place] [address]     Phone:  [phone]     fluconazole 150 MG tablet    metroNIDAZOLE 0.75 % vaginal gel                Primary Care Provider Office Phone # Fax #    Ibrahima Nicole [phone]       [facility] [address] [facility] [address]        Equal Access to Services     AVTAR MOJICA : Hadii aad ku hadasho Soomaali, waaxda luqadaha, qaybta kaalmada adeegyada, waxadalid sunin haydrissn vikas valadez . So [facility] [place] [phone].    ATENCIÓN: Si habla español, tiene a swain disposición servicios gratuitos de asistencia lingüística. [facility] [phone].    We comply with applicable federal civil rights laws and [place] laws. We do not discriminate on the basis of race, color, national origin, age, disability, sex, sexual orientation, or gender identity.            Thank you!     Thank you for choosing [facility]  for your care. Our goal is always to provide you with excellent care. Hearing back from our patients is one way we can continue to improve our services. Please take a few minutes to complete the written survey that you may receive in the mail after your visit with us. Thank you!             Your Updated Medication List - Protect others around you: Learn how to safely use, store and throw away your medicines at www.disposemymeds.org.          This list is accurate as of 8/23/18  9:22 AM.  Always use your most recent med list.                   Brand Name Dispense Instructions for use Diagnosis    Black Cohosh 160 MG Caps      Take 2 capsules by mouth        calcium carbonate 600 MG tablet    OS-TAYLOR 600 mg [place]. Ca     Take 1 tablet by mouth 2 times daily (with meals)        cholecalciferol 35062 units capsule    vitamin D3     Take 1 capsule by mouth daily         cyanocolbalamin 500 MCG tablet    vitamin  B-12     Take 500 mcg by mouth 2 times daily        cyclobenzaprine 5 MG tablet    FLEXERIL    30 tablet    Take 1 tablet (5 mg) by mouth nightly as needed for muscle spasms    Fibromyalgia syndrome       DULoxetine HCl 40 MG Cpep     30 capsule    Take 40 mg by mouth daily    Recurrent major depression in partial remission (H)       ENSURE PLUS Liqd      Take by mouth 3 times daily (with meals)        FISH OIL OMEGA-3 PO           fluconazole 150 MG tablet    DIFLUCAN    2 tablet    Take 1 tablet today, repeat treatment in 48-72 hrs    Vaginal irritation       HYDROcodone-acetaminophen 5-325 MG per tablet    NORCO    12 tablet    Take 1 tablet by mouth every 6 hours as needed for moderate to severe pain    Abdominal pain, epigastric       LORazepam 0.5 MG tablet    ATIVAN    30 tablet    Take 1 tablet (0.5 mg) by mouth daily as needed for anxiety    Anxiety       metroNIDAZOLE 0.75 % vaginal gel    METROGEL    70 g    Place 1 applicator (5 g) vaginally At Bedtime for 5 days    Vaginal irritation       ondansetron 8 MG tablet    ZOFRAN    36 tablet    Take 3 tablets (24 mg) by mouth every 8 hours as needed for nausea Place on all labels pt needs an appointment for further refills please schedule.    Fibromyalgia syndrome       polyethylene glycol powder    MIRALAX    527 g    Take 17 g by mouth daily STIR 1 CAPFUL (17GM) IN 8 OZ OF LIQUID AND DRINK    Chronic constipation       POTASSIUM CHLORIDE ER PO      Take 20 mEq by mouth 2 times daily        RANITIDINE HCL PO      Take 150 mg by mouth 2 times daily        SUMATRIPTAN SUCCINATE PO      Take 50 mg by mouth once Take 50 mg (1 tablet) at onset of headache, may repeat in 2 hours if needed        VITAMIN C ER PO      Take 1,000 mg by mouth 3 times daily        ZINC PO      Take 60 mg by mouth daily

## 2018-08-23 NOTE — PROGRESS NOTES
"  SUBJECTIVE:   Piper Long is a 48 year old female who presents to clinic today for the following health issues:  Chief Complaint   Patient presents with     Vaginal Problem     has been on antibiotic, boy friend has a yeast infection. Would like some STD testing.     Problem list and histories reviewed & adjusted, as indicated.  Additional history: as documented    Labs reviewed in EPIC    Reviewed and updated as needed this visit by clinical staff       Reviewed and updated as needed this visit by Provider         ROS:  Constitutional, HEENT, cardiovascular, pulmonary, gi and gu systems are negative, except as otherwise noted.    OBJECTIVE:     /72  Pulse 82  Temp 98.1  F (36.7  C) (Tympanic)  Ht 5' 7.5\" (1.715 m)  Wt 133 lb 6.4 oz (60.5 kg)  SpO2 98%  BMI 20.59 kg/m2  Body mass index is 20.59 kg/(m^2).  GENERAL: healthy, alert and no distress   (female): normal female external genitalia, normal urethral meatus, vaginal mucosa, normal cervix/adnexa/uterus without masses or discharge    Diagnostic Test Results:  Results for orders placed or performed in visit on 08/23/18 (from the past 24 hour(s))   Wet prep   Result Value Ref Range    Specimen Description Vagina     Wet Prep Yeast seen (A)     Wet Prep Clue cells seen (A)     Wet Prep No Trichomonas seen        ASSESSMENT/PLAN:     1. Vaginal irritation  -positive for yeast and BV  - Wet prep  - fluconazole (DIFLUCAN) 150 MG tablet; Take 1 tablet today, repeat treatment in 48-72 hrs  Dispense: 2 tablet; Refill: 0  - metroNIDAZOLE (METROGEL) 0.75 % vaginal gel; Place 1 applicator (5 g) vaginally At Bedtime for 5 days  Dispense: 70 g; Refill: 0    2. Screen for STD (sexually transmitted disease)  - Chlamydia trachomatis PCR  - Neisseria gonorrhoeae PCR  - HSV 1 and 2 DNA by PCR  - Treponema Abs w Reflex to RPR and Titer  - HIV Antigen Antibody Combo  - Hepatitis C antibody    3. Cervical cancer screening  - Pap imaged thin layer screen with HPV - " recommended age 30 - 65 years (select HPV order below)    See Patient Instructions    BARBIE Henson [facility]

## 2018-08-24 LAB
C TRACH DNA SPEC QL NAA+PROBE: NEGATIVE
HSV1 DNA SPEC QL NAA+PROBE: NEGATIVE
HSV2 DNA SPEC QL NAA+PROBE: NEGATIVE
N GONORRHOEA DNA SPEC QL NAA+PROBE: NEGATIVE
SPECIMEN SOURCE: NORMAL
T PALLIDUM AB SER QL: NONREACTIVE

## 2018-08-24 ASSESSMENT — PATIENT HEALTH QUESTIONNAIRE - PHQ9: SUM OF ALL RESPONSES TO PHQ QUESTIONS 1-9: 12

## 2018-08-27 LAB
COPATH REPORT: NORMAL
PAP: NORMAL

## 2018-08-29 LAB
FINAL DIAGNOSIS: NORMAL
HPV HR 12 DNA CVX QL NAA+PROBE: NEGATIVE
HPV16 DNA SPEC QL NAA+PROBE: NEGATIVE
HPV18 DNA SPEC QL NAA+PROBE: NEGATIVE
SPECIMEN DESCRIPTION: NORMAL
SPECIMEN SOURCE CVX/VAG CYTO: NORMAL

## 2018-09-11 ENCOUNTER — TRANSFERRED RECORDS (OUTPATIENT)
Dept: HEALTH INFORMATION MANAGEMENT | Facility: CLINIC | Age: 48
End: 2018-09-11

## 2018-10-04 ENCOUNTER — OFFICE VISIT (OUTPATIENT)
Dept: OBGYN | Facility: CLINIC | Age: 48
End: 2018-10-04
Payer: MEDICARE

## 2018-10-04 VITALS
DIASTOLIC BLOOD PRESSURE: 59 MMHG | RESPIRATION RATE: 16 BRPM | BODY MASS INDEX: 20.16 KG/M2 | WEIGHT: 133 LBS | HEIGHT: 68 IN | HEART RATE: 61 BPM | SYSTOLIC BLOOD PRESSURE: 96 MMHG | TEMPERATURE: 95.5 F

## 2018-10-04 DIAGNOSIS — N95.0 PMB (POSTMENOPAUSAL BLEEDING): ICD-10-CM

## 2018-10-04 DIAGNOSIS — N95.1 SYMPTOMATIC MENOPAUSAL OR FEMALE CLIMACTERIC STATES: Primary | ICD-10-CM

## 2018-10-04 PROCEDURE — 99202 OFFICE O/P NEW SF 15 MIN: CPT | Performed by: OBSTETRICS & GYNECOLOGY

## 2018-10-04 RX ORDER — DOXYCYCLINE HYCLATE 100 MG
100 TABLET ORAL 2 TIMES DAILY
COMMUNITY
End: 2022-04-19

## 2018-10-04 NOTE — PROGRESS NOTES
Piper is a 48 year old   female who presents for advice about hormones initiated at [facility]..She had endometrial ablation in  and was amenorrheic since then; in past 1-2 years, she has been having quite severe hot flashes, night sweats, moodiness and sleep issues; sleep aids cause increased depression symptoms.  She had an FSH done which was elevated c/w menopause  She was started on Prempro about 4 months ago and her symptoms improved but she started bleeding; the dose was decreased to Prempro LO about 2 weeks; her symptoms seem to be controlled and she has stopped bleeding  She does take an OTC supplement that has phytoestrogens    Patient Active Problem List    Diagnosis Date Noted     Suicidal ideation 2017     Priority: Medium     Anxiety 10/01/2013     Priority: Medium     GERD (gastroesophageal reflux disease) 2013     Priority: Medium     Crohn's colitis (H) 2013     Priority: Medium     With episodic small bowel obstructions       Health Care Home-Not Active 2012     Priority: Medium     State Tier Level:    Status:  Unable to reach 3/10/14  Care Coordinator:  Kristin Sanchez    **See Letters for [facility] Care Plan               Malnutrition (H) 2011     Priority: Medium     Vitamin D deficiency 2011     Priority: Medium     Vitamin D 26.3 2011  [facility], Abstract called for       Iron deficiency anemia 2011     Priority: Medium     IRON 31  Saturation 12  TIBC 241  Iron supplementation          CARDIOVASCULAR SCREENING; LDL GOAL LESS THAN 160 2011     Priority: Medium     Fibromyalgia syndrome 2010     Priority: Medium     Rheumatologist Randi Gore,          Chronic constipation 2010     Priority: Medium     Miralax  Increased Fiber- Dietary Changes           Crohns disease (H) 2010     Priority: Medium     Followed by [place] [facility]    MRI ABDOMEN PELVIS 4/15/2011 SMALL BOWEL CROHNS  STRICTURES AND ACTIVE INFLAMMATION  [facility] -[phone]          Recurrent major depression in partial remission (H) 07/01/2010     Priority: Medium     Since diagnosis of Crohns and Divorce. No suicidal ideation   Dr. Kayla Johansen in [place] she has seen for psychiatry. Prozac  mg daily helps to keep her stable.          All systems were reviewed and pertinent information in noted in subjective/HPI.    Past Medical History:   Diagnosis Date     Crohn's colitis (H) 1999    [facility]  area. Has been to the [facility] for consultion. Has tried Imuran, Remicade, Humira, Methotrexate     Menorrhagia 6/7/2013     Partial small bowel obstruction (H) 5/17/2013     PONV (postoperative nausea and vomiting)      SBO (small bowel obstruction) (H) 2/4/2013       Past Surgical History:   Procedure Laterality Date     ABDOMEN SURGERY  2013    x 3 stricture removal.      CHOLECYSTECTOMY, LAPOROSCOPIC       COLONOSCOPY  05/13/10     DILATION AND CURETTAGE, HYSTEROSCOPY, ABLATE ENDOMETRIUM NOVASURE, COMBINED  12/11/2012    Procedure: COMBINED DILATION AND CURETTAGE, HYSTEROSCOPY, ABLATE ENDOMETRIUM NOVASURE;  Hysteroscopy, Dilataion and Curettage with Endometrial Ablation,Novasure;  Surgeon: Dana Vance MD;  Location: [facility]     INSERT PORT VASCULAR ACCESS  3/6/2014    Procedure: INSERT PORT VASCULAR ACCESS;  Power Port Placement;  Surgeon: Oneal Stephens MD;  Location: [facility] OR     SURGICAL HISTORY OF -   1992    rhinoplasty      TUBAL LIGATION  1997         Current Outpatient Prescriptions:      Ascorbic Acid (VITAMIN C ER PO), Take 1,000 mg by mouth 3 times daily , Disp: , Rfl:      calcium carbonate (OS-TAYLOR 600 MG [place]. CA) 600 MG tablet, Take 1 tablet by mouth 2 times daily (with meals), Disp: , Rfl:      cholecalciferol (VITAMIN D3) 48365 UNITS capsule, Take 1 capsule by mouth daily, Disp: , Rfl:      cyanocolbalamin (VITAMIN  B-12) 500 MCG tablet, Take 500 mcg by mouth 2  times daily, Disp: , Rfl:      doxycycline (VIBRA-TABS) 100 MG tablet, Take 100 mg by mouth 2 times daily, Disp: , Rfl:      HYDROcodone-acetaminophen (NORCO) 5-325 MG per tablet, Take 1 tablet by mouth every 6 hours as needed for moderate to severe pain (Patient taking differently: Take 1 tablet by mouth every 6 hours as needed for moderate to severe pain Pt reports that she takes 1/2-1 tablet (takes 1/2 tablet first; if not effective, takes the second 1/2 tablet)), Disp: 12 tablet, Rfl: 0     Nutritional Supplements (ENSURE PLUS) LIQD, Take by mouth 3 times daily (with meals), Disp: , Rfl:      Omega-3 Fatty Acids (FISH OIL OMEGA-3 PO), , Disp: , Rfl:      ondansetron (ZOFRAN) 8 MG tablet, Take 3 tablets (24 mg) by mouth every 8 hours as needed for nausea Place on all labels pt needs an appointment for further refills please schedule., Disp: 36 tablet, Rfl: 5     polyethylene glycol (MIRALAX) powder, Take 17 g by mouth daily STIR 1 CAPFUL (17GM) IN 8 OZ OF LIQUID AND DRINK (Patient taking differently: Take 17 g by mouth daily STIR 1 CAPFUL (17GM) IN 8 OZ OF LIQUID AND DRINK May repeat up to QID per pt request), Disp: 527 g, Rfl: 0     POTASSIUM CHLORIDE ER PO, Take 20 mEq by mouth 2 times daily, Disp: , Rfl:      SUMATRIPTAN SUCCINATE PO, Take 50 mg by mouth once Take 50 mg (1 tablet) at onset of headache, may repeat in 2 hours if needed, Disp: , Rfl:      Black Cohosh 160 MG CAPS, Take 2 capsules by mouth, Disp: , Rfl:      cyclobenzaprine (FLEXERIL) 5 MG tablet, Take 1 tablet (5 mg) by mouth nightly as needed for muscle spasms (Patient not taking: Reported on 8/23/2018), Disp: 30 tablet, Rfl: 0     DULoxetine 40 MG CPEP, Take 40 mg by mouth daily (Patient not taking: Reported on 10/4/2018), Disp: 30 capsule, Rfl: 0     fluconazole (DIFLUCAN) 150 MG tablet, Take 1 tablet today, repeat treatment in 48-72 hrs (Patient not taking: Reported on 10/4/2018), Disp: 2 tablet, Rfl: 0     LORazepam (ATIVAN) 0.5 MG tablet,  "Take 1 tablet (0.5 mg) by mouth daily as needed for anxiety (Patient not taking: Reported on 8/23/2018), Disp: 30 tablet, Rfl: 0     Multiple Vitamins-Minerals (ZINC PO), Take 60 mg by mouth daily, Disp: , Rfl:      RANITIDINE HCL PO, Take 150 mg by mouth 2 times daily, Disp: , Rfl:     ALLERGIES:  Chloraprep one step; Diazepam; Erythromycin; Flagyl [metronidazole hcl]; Humira; Morphine [fumaric acid]; Oxycodone; Percocet [oxycodone-acetaminophen]; Vancomycin; Droperidol; Prochlorperazine; Risperdal; and Risperidone    Social History     Social History     Marital status:      Spouse name: N/A     Number of children: N/A     Years of education: N/A     Social History Main Topics     Smoking status: Never Smoker     Smokeless tobacco: Never Used     Alcohol use Yes      Comment: couple drinks couple times a week     Drug use: Yes      Comment: some medicianl marijuana     Sexual activity: Yes     Partners: Male     Birth control/ protection: Surgical, Condom     Other Topics Concern     Parent/Sibling W/ Cabg, Mi Or Angioplasty Before 65f 55m? Yes     father had CAD      Service No     Blood Transfusions No     Caffeine Concern No     Occupational Exposure No     Hobby Hazards No     Sleep Concern No     Stress Concern Yes     Weight Concern No     Special Diet Yes     Back Care No     Exercise Yes     walking, stretching     Bike Helmet No     Seat Belt Yes     Self-Exams No     Social History Narrative       Family History   Problem Relation Age of Onset     Hypertension Father      C.A.D. Father      Cardiovascular Father      Cerebrovascular Disease Father      Allergies Son      Breast Cancer Mother        OBJECTIVE:  Vitals: BP 96/59 (BP Location: Right arm, Patient Position: Chair, Cuff Size: Adult Small)  Pulse 61  Temp 95.5  F (35.3  C) (Tympanic)  Resp 16  Ht 5' 7.5\" (1.715 m)  Wt 133 lb (60.3 kg)  BMI 20.52 kg/m2 BMI= Body mass index is 20.52 kg/(m^2).   No LMP recorded. Patient has " had an ablation.     GENERAL APPEARANCE: healthy, alert and no distress    ASSESSMENT:      ICD-10-CM    1. Symptomatic menopausal or female climacteric states N95.1    2. PMB (postmenopausal bleeding) N95.0        PLAN:  The most common cause for bleeding in this context is the initiation of HRT; I recommend giving it up to 6 months to see if bleeding resolved; if not, then can separate out the estrogen and progestin and adjust doses  Halie Vance MD  [facility]    Duration of visit:  20 minutes, 100% in discussion of current issues, treatment options and treatment planning.  HALIE Vance MD

## 2018-10-04 NOTE — MR AVS SNAPSHOT
"              After Visit Summary   10/4/2018    Piper Long    MRN: [id number]           Patient Information     Date Of Birth          1970        Visit Information        Provider Department      10/4/2018 11:00 AM Dana Vance MD [facility]        Today's Diagnoses     Symptomatic menopausal or female climacteric states    -  1    PMB (postmenopausal bleeding)           Follow-ups after your visit        Who to contact     If you have questions or need follow up information about today's clinic visit or your schedule please contact [facility] directly at [phone].  Normal or non-critical lab and imaging results will be communicated to you by MyChart, letter or phone within 4 business days after the clinic has received the results. If you do not hear from us within 7 days, please contact the clinic through [employer] or phone. If you have a critical or abnormal lab result, we will notify you by phone as soon as possible.  Submit refill requests through [facility] or call your pharmacy and they will forward the refill request to us. Please allow 3 business days for your refill to be completed.          Additional Information About Your Visit        MyChart Information     [facility] gives you secure access to your electronic health record. If you see a primary care provider, you can also send messages to your care team and make appointments. If you have questions, please call your primary care clinic.  If you do not have a primary care provider, please call [phone] and they will assist you.        Care EveryWhere ID     This is your Care EveryWhere ID. This could be used by other organizations to access your [facility] medical records  [id number]        Your Vitals Were     Pulse Temperature Respirations Height BMI (Body Mass Index)       61 95.5  F (35.3  C) (Tympanic) 16 5' 7.5\" (1.715 m) 20.52 kg/m2        Blood Pressure from Last 3 Encounters:   10/04/18 96/59 "   08/23/18 118/72   10/02/15 113/77    Weight from Last 3 Encounters:   10/04/18 133 lb (60.3 kg)   08/23/18 133 lb 6.4 oz (60.5 kg)   09/23/15 146 lb 6.4 oz (66.4 kg)              Today, you had the following     No orders found for display         Today's Medication Changes          These changes are accurate as of 10/4/18 11:55 AM.  If you have any questions, ask your nurse or doctor.               These medicines have changed or have updated prescriptions.        Dose/Directions    HYDROcodone-acetaminophen 5-325 MG per tablet   Commonly known as:  NORCO   This may have changed:  additional instructions   Used for:  Abdominal pain, epigastric        Dose:  1 tablet   Take 1 tablet by mouth every 6 hours as needed for moderate to severe pain   Quantity:  12 tablet   Refills:  0       polyethylene glycol powder   Commonly known as:  MIRALAX   This may have changed:  additional instructions   Used for:  Chronic constipation        Dose:  17 g   Take 17 g by mouth daily STIR 1 CAPFUL (17GM) IN 8 OZ OF LIQUID AND DRINK   Quantity:  527 g   Refills:  0                Primary Care Provider Office Phone # Fax #    Ibrahima Nicole [phone]       [place] [address] [place] [address]        Equal Access to Services     AVTAR MOJICA AH: Hadii fortino sams hadasho Soomaali, waaxda luqadaha, qaybta kaalmada adeegyada, livier noyola. So [facility] [phone].    ATENCIÓN: Si habla español, tiene a swain disposición servicios gratuitos de asistencia lingüística. Llame al [phone].    We comply with applicable federal civil rights laws and [place] laws. We do not discriminate on the basis of race, color, national origin, age, disability, sex, sexual orientation, or gender identity.            Thank you!     Thank you for choosing [facility]  for your care. Our goal is always to provide you with excellent care. Hearing back from our patients is one way  we can continue to improve our services. Please take a few minutes to complete the written survey that you may receive in the mail after your visit with us. Thank you!             Your Updated Medication List - Protect others around you: Learn how to safely use, store and throw away your medicines at www.disposemymeds.org.          This list is accurate as of 10/4/18 11:55 AM.  Always use your most recent med list.                   Brand Name Dispense Instructions for use Diagnosis    Black Cohosh 160 MG Caps      Take 2 capsules by mouth        calcium carbonate 600 MG tablet    OS-TAYLOR 600 mg [place]. [place]     Take 1 tablet by mouth 2 times daily (with meals)        cholecalciferol 69705 units capsule    vitamin D3     Take 1 capsule by mouth daily        cyanocolbalamin 500 MCG tablet    vitamin  B-12     Take 500 mcg by mouth 2 times daily        cyclobenzaprine 5 MG tablet    FLEXERIL    30 tablet    Take 1 tablet (5 mg) by mouth nightly as needed for muscle spasms    Fibromyalgia syndrome       doxycycline 100 MG tablet    VIBRA-TABS     Take 100 mg by mouth 2 times daily        DULoxetine HCl 40 MG Cpep     30 capsule    Take 40 mg by mouth daily    Recurrent major depression in partial remission (H)       ENSURE PLUS Liqd      Take by mouth 3 times daily (with meals)        FISH OIL OMEGA-3 PO           fluconazole 150 MG tablet    DIFLUCAN    2 tablet    Take 1 tablet today, repeat treatment in 48-72 hrs    Vaginal irritation       HYDROcodone-acetaminophen 5-325 MG per tablet    NORCO    12 tablet    Take 1 tablet by mouth every 6 hours as needed for moderate to severe pain    Abdominal pain, epigastric       LORazepam 0.5 MG tablet    ATIVAN    30 tablet    Take 1 tablet (0.5 mg) by mouth daily as needed for anxiety    Anxiety       ondansetron 8 MG tablet    ZOFRAN    36 tablet    Take 3 tablets (24 mg) by mouth every 8 hours as needed for nausea Place on all labels pt needs an appointment for further refills  please schedule.    Fibromyalgia syndrome       polyethylene glycol powder    MIRALAX    527 g    Take 17 g by mouth daily STIR 1 CAPFUL (17GM) IN 8 OZ OF LIQUID AND DRINK    Chronic constipation       POTASSIUM CHLORIDE ER PO      Take 20 mEq by mouth 2 times daily        RANITIDINE HCL PO      Take 150 mg by mouth 2 times daily        SUMATRIPTAN SUCCINATE PO      Take 50 mg by mouth once Take 50 mg (1 tablet) at onset of headache, may repeat in 2 hours if needed        VITAMIN C ER PO      Take 1,000 mg by mouth 3 times daily        ZINC PO      Take 60 mg by mouth daily

## 2018-11-16 ENCOUNTER — OFFICE VISIT (OUTPATIENT)
Dept: OBGYN | Facility: CLINIC | Age: 48
End: 2018-11-16
Payer: MEDICARE

## 2018-11-16 VITALS
TEMPERATURE: 96.2 F | HEIGHT: 68 IN | WEIGHT: 137 LBS | BODY MASS INDEX: 20.76 KG/M2 | RESPIRATION RATE: 18 BRPM | HEART RATE: 55 BPM | DIASTOLIC BLOOD PRESSURE: 59 MMHG | SYSTOLIC BLOOD PRESSURE: 106 MMHG

## 2018-11-16 DIAGNOSIS — N64.4 BREAST TENDERNESS IN FEMALE: Primary | ICD-10-CM

## 2018-11-16 PROCEDURE — 99213 OFFICE O/P EST LOW 20 MIN: CPT | Performed by: OBSTETRICS & GYNECOLOGY

## 2018-11-16 NOTE — PROGRESS NOTES
"Piper is a 48 year old   female who presents for advice regarding new symptoms; she stopped her Prempro-LO about 5 weeks ago. She also received a single dose of Entyvio and shortly after develops \"swelling and pain\" in her right breast; she had a 3-D mammo and sonogram which showed some Fibrocystic change and reportedly \"a clot under my collar bone\"; she denies hand or arm swelling;  Although she stopped the HRT, she continues to take an herbal product with phytoestrogens.  She believes the Entyvio is what is giving her the symptoms..    Patient Active Problem List    Diagnosis Date Noted     Suicidal ideation 2017     Priority: Medium     Anxiety 10/01/2013     Priority: Medium     GERD (gastroesophageal reflux disease) 2013     Priority: Medium     Crohn's colitis (H) 2013     Priority: Medium     With episodic small bowel obstructions       Health Care Home-Not Active 2012     Priority: Medium     State Tier Level:    Status:  Unable to reach 3/10/14  Care Coordinator:  Kristin Sanchez    **See Letters for [facility] Care Plan               Malnutrition (H) 2011     Priority: Medium     Vitamin D deficiency 2011     Priority: Medium     Vitamin D 26.3 2011  [facility], Abstract called for       Iron deficiency anemia 2011     Priority: Medium     IRON 31  Saturation 12  TIBC 241  Iron supplementation          CARDIOVASCULAR SCREENING; LDL GOAL LESS THAN 160 2011     Priority: Medium     Fibromyalgia syndrome 2010     Priority: Medium     Rheumatologist Randi Gore,          Chronic constipation 2010     Priority: Medium     Miralax  Increased Fiber- Dietary Changes           Crohns disease (H) 2010     Priority: Medium     Followed by [place] [facility]    MRI ABDOMEN PELVIS 4/15/2011 SMALL BOWEL CROHNS STRICTURES AND ACTIVE INFLAMMATION  [facility] -[phone]          Recurrent major depression in " partial remission (H) 07/01/2010     Priority: Medium     Since diagnosis of Crohns and Divorce. No suicidal ideation   Dr. Kayla Johansen in [place] she has seen for psychiatry. Prozac  mg daily helps to keep her stable.          All systems were reviewed and pertinent information in noted in subjective/HPI.    Past Medical History:   Diagnosis Date     Crohn's colitis (H) 1999    [facility] [place]. Has been to the [facility] for consultion. Has tried Imuran, Remicade, Humira, Methotrexate     Menorrhagia 6/7/2013     Partial small bowel obstruction (H) 5/17/2013     PONV (postoperative nausea and vomiting)      SBO (small bowel obstruction) (H) 2/4/2013       Past Surgical History:   Procedure Laterality Date     ABDOMEN SURGERY  2013    x 3 stricture removal.      CHOLECYSTECTOMY, LAPOROSCOPIC       COLONOSCOPY  05/13/10     DILATION AND CURETTAGE, HYSTEROSCOPY, ABLATE ENDOMETRIUM NOVASURE, COMBINED  12/11/2012    Procedure: COMBINED DILATION AND CURETTAGE, HYSTEROSCOPY, ABLATE ENDOMETRIUM NOVASURE;  Hysteroscopy, Dilataion and Curettage with Endometrial Ablation,Novasure;  Surgeon: Dana Vance MD;  Location: [facility] OR     INSERT PORT VASCULAR ACCESS  3/6/2014    Procedure: INSERT PORT VASCULAR ACCESS;  Power Port Placement;  Surgeon: Oneal Stephens MD;  Location: [facility] OR     SURGICAL HISTORY OF -   1992    rhinoplasty      TUBAL LIGATION  1997         Current Outpatient Prescriptions:      Ascorbic Acid (VITAMIN C ER PO), Take 1,000 mg by mouth 3 times daily , Disp: , Rfl:      calcium carbonate (OS-TAYLOR 600 MG [place]. CA) 600 MG tablet, Take 1 tablet by mouth 2 times daily (with meals), Disp: , Rfl:      cholecalciferol (VITAMIN D3) 25852 UNITS capsule, Take 1 capsule by mouth daily, Disp: , Rfl:      cyanocolbalamin (VITAMIN  B-12) 500 MCG tablet, Take 500 mcg by mouth 2 times daily, Disp: , Rfl:      HYDROcodone-acetaminophen (NORCO) 5-325 MG per tablet, Take 1 tablet by mouth  every 6 hours as needed for moderate to severe pain, Disp: 12 tablet, Rfl: 0     Nutritional Supplements (ENSURE PLUS) LIQD, Take by mouth 3 times daily (with meals), Disp: , Rfl:      Omega-3 Fatty Acids (FISH OIL OMEGA-3 PO), , Disp: , Rfl:      ondansetron (ZOFRAN) 8 MG tablet, Take 3 tablets (24 mg) by mouth every 8 hours as needed for nausea Place on all labels pt needs an appointment for further refills please schedule., Disp: 36 tablet, Rfl: 5     polyethylene glycol (MIRALAX) powder, Take 17 g by mouth daily STIR 1 CAPFUL (17GM) IN 8 OZ OF LIQUID AND DRINK (Patient taking differently: Take 17 g by mouth daily STIR 1 CAPFUL (17GM) IN 8 OZ OF LIQUID AND DRINK May repeat up to QID per pt request), Disp: 527 g, Rfl: 0     POTASSIUM CHLORIDE ER PO, Take 20 mEq by mouth 2 times daily, Disp: , Rfl:      SUMATRIPTAN SUCCINATE PO, Take 50 mg by mouth once Take 50 mg (1 tablet) at onset of headache, may repeat in 2 hours if needed, Disp: , Rfl:      Black Cohosh 160 MG CAPS, Take 2 capsules by mouth, Disp: , Rfl:      cyclobenzaprine (FLEXERIL) 5 MG tablet, Take 1 tablet (5 mg) by mouth nightly as needed for muscle spasms (Patient not taking: Reported on 8/23/2018), Disp: 30 tablet, Rfl: 0     doxycycline (VIBRA-TABS) 100 MG tablet, Take 100 mg by mouth 2 times daily, Disp: , Rfl:      DULoxetine 40 MG CPEP, Take 40 mg by mouth daily (Patient not taking: Reported on 10/4/2018), Disp: 30 capsule, Rfl: 0     fluconazole (DIFLUCAN) 150 MG tablet, Take 1 tablet today, repeat treatment in 48-72 hrs (Patient not taking: Reported on 10/4/2018), Disp: 2 tablet, Rfl: 0     LORazepam (ATIVAN) 0.5 MG tablet, Take 1 tablet (0.5 mg) by mouth daily as needed for anxiety (Patient not taking: Reported on 8/23/2018), Disp: 30 tablet, Rfl: 0     Multiple Vitamins-Minerals (ZINC PO), Take 60 mg by mouth daily, Disp: , Rfl:      RANITIDINE HCL PO, Take 150 mg by mouth 2 times daily, Disp: , Rfl:     ALLERGIES:  Chloraprep one step;  "Diazepam; Erythromycin; Flagyl [metronidazole hcl]; Humira; Morphine [fumaric acid]; Oxycodone; Percocet [oxycodone-acetaminophen]; Vancomycin; Droperidol; Prochlorperazine; Risperdal; and Risperidone    Social History     Social History     Marital status:      Spouse name: N/A     Number of children: N/A     Years of education: N/A     Social History Main Topics     Smoking status: Never Smoker     Smokeless tobacco: Never Used     Alcohol use Yes      Comment: couple drinks couple times a week     Drug use: Yes      Comment: some medicianl marijuana     Sexual activity: Yes     Partners: Male     Birth control/ protection: Surgical, Condom     Other Topics Concern     Parent/Sibling W/ Cabg, Mi Or Angioplasty Before 65f 55m? Yes     father had CAD      Service No     Blood Transfusions No     Caffeine Concern No     Occupational Exposure No     Hobby Hazards No     Sleep Concern No     Stress Concern Yes     Weight Concern No     Special Diet Yes     Back Care No     Exercise Yes     walking, stretching     Bike Helmet No     Seat Belt Yes     Self-Exams No     Social History Narrative       Family History   Problem Relation Age of Onset     Hypertension Father      C.A.D. Father      Cardiovascular Father      Cerebrovascular Disease Father      Allergies Son      Breast Cancer Mother        OBJECTIVE:  Vitals: /59 (BP Location: Right arm, Patient Position: Chair, Cuff Size: Adult Small)  Pulse 55  Temp 96.2  F (35.7  C) (Tympanic)  Resp 18  Ht 5' 7.5\" (1.715 m)  Wt 137 lb (62.1 kg)  BMI 21.14 kg/m2 BMI= Body mass index is 21.14 kg/(m^2).   No LMP recorded. Patient has had an ablation.     GENERAL APPEARANCE: healthy, alert and no distress     BREAST: appear symmetric; no skin changes, venous congestion; moderate FCD in both upper outer quadrants but no discrete masses; no nipple discharge     SKIN: of both arms and hands appears normal    ASSESSMENT:      ICD-10-CM    1. Breast " "tenderness in female N64.4        PLAN:  I discussed with Piper that I do not see any reported adverse affects of Entyvio WRT thrombophilia  I suggest she stop the herbal hormones, and if her menopausal symptoms get worse, we can use non-hormonal means of trying to control her symptoms  Also suggested a low dose ASA and to follow the advice of her PCP WRT the \"clot under (my) collar bone\"  Dana Vance MD  [facility]          Dana Vance MD    "

## 2018-11-16 NOTE — MR AVS SNAPSHOT
"              After Visit Summary   11/16/2018    Piper Long    MRN: [id number]           Patient Information     Date Of Birth          1970        Visit Information        Provider Department      11/16/2018 11:00 AM Dana Vance MD [facility]        Today's Diagnoses     Breast tenderness in female    -  1       Follow-ups after your visit        Who to contact     If you have questions or need follow up information about today's clinic visit or your schedule please contact [facility] directly at [phone].  Normal or non-critical lab and imaging results will be communicated to you by CloudOnehart, letter or phone within 4 business days after the clinic has received the results. If you do not hear from us within 7 days, please contact the clinic through StyleCastert or phone. If you have a critical or abnormal lab result, we will notify you by phone as soon as possible.  Submit refill requests through Hiri or call your pharmacy and they will forward the refill request to us. Please allow 3 business days for your refill to be completed.          Additional Information About Your Visit        MyChart Information     Hiri gives you secure access to your electronic health record. If you see a primary care provider, you can also send messages to your care team and make appointments. If you have questions, please call your primary care clinic.  If you do not have a primary care provider, please call [phone] and they will assist you.        Care EveryWhere ID     This is your Care EveryWhere ID. This could be used by other organizations to access your [facility] medical records  [id number]        Your Vitals Were     Pulse Temperature Respirations Height BMI (Body Mass Index)       55 96.2  F (35.7  C) (Tympanic) 18 5' 7.5\" (1.715 m) 21.14 kg/m2        Blood Pressure from Last 3 Encounters:   11/16/18 106/59   10/04/18 96/59   08/23/18 118/72    Weight from Last 3 " Encounters:   11/16/18 137 lb (62.1 kg)   10/04/18 133 lb (60.3 kg)   08/23/18 133 lb 6.4 oz (60.5 kg)              Today, you had the following     No orders found for display         Today's Medication Changes          These changes are accurate as of 11/16/18 11:35 AM.  If you have any questions, ask your nurse or doctor.               These medicines have changed or have updated prescriptions.        Dose/Directions    polyethylene glycol powder   Commonly known as:  MIRALAX   This may have changed:  additional instructions   Used for:  Chronic constipation        Dose:  17 g   Take 17 g by mouth daily STIR 1 CAPFUL (17GM) IN 8 OZ OF LIQUID AND DRINK   Quantity:  527 g   Refills:  0                Primary Care Provider Office Phone # Fax #    Ibrahima Nicole [phone]       [facility] [address] [address]        Equal Access to Services     AVTAR MOJICA : Hadii fortino hurtadoo Somckayla, waaxda luqpierre, qaybta kaalmada subhash, livier valadez . So [facility] [phone].    ATENCIÓN: Si habla español, tiene a swain disposición servicios gratuitos de asistencia lingüística. Larisa al [phone].    We comply with applicable federal civil rights laws and [place] laws. We do not discriminate on the basis of race, color, national origin, age, disability, sex, sexual orientation, or gender identity.            Thank you!     Thank you for choosing [facility]  for your care. Our goal is always to provide you with excellent care. Hearing back from our patients is one way we can continue to improve our services. Please take a few minutes to complete the written survey that you may receive in the mail after your visit with us. Thank you!             Your Updated Medication List - Protect others around you: Learn how to safely use, store and throw away your medicines at www.disposemymeds.org.          This list is accurate as of 11/16/18  11:35 AM.  Always use your most recent med list.                   Brand Name Dispense Instructions for use Diagnosis    Black Cohosh 160 MG Caps      Take 2 capsules by mouth        calcium carbonate 600 MG tablet    OS-TAYLOR 600 mg [place]. Ca     Take 1 tablet by mouth 2 times daily (with meals)        cholecalciferol 93280 units capsule    vitamin D3     Take 1 capsule by mouth daily        cyanocolbalamin 500 MCG tablet    vitamin  B-12     Take 500 mcg by mouth 2 times daily        cyclobenzaprine 5 MG tablet    FLEXERIL    30 tablet    Take 1 tablet (5 mg) by mouth nightly as needed for muscle spasms    Fibromyalgia syndrome       doxycycline 100 MG tablet    VIBRA-TABS     Take 100 mg by mouth 2 times daily        DULoxetine HCl 40 MG Cpep     30 capsule    Take 40 mg by mouth daily    Recurrent major depression in partial remission (H)       ENSURE PLUS Liqd      Take by mouth 3 times daily (with meals)        FISH OIL OMEGA-3 PO           fluconazole 150 MG tablet    DIFLUCAN    2 tablet    Take 1 tablet today, repeat treatment in 48-72 hrs    Vaginal irritation       HYDROcodone-acetaminophen 5-325 MG per tablet    NORCO    12 tablet    Take 1 tablet by mouth every 6 hours as needed for moderate to severe pain    Abdominal pain, epigastric       LORazepam 0.5 MG tablet    ATIVAN    30 tablet    Take 1 tablet (0.5 mg) by mouth daily as needed for anxiety    Anxiety       ondansetron 8 MG tablet    ZOFRAN    36 tablet    Take 3 tablets (24 mg) by mouth every 8 hours as needed for nausea Place on all labels pt needs an appointment for further refills please schedule.    Fibromyalgia syndrome       polyethylene glycol powder    MIRALAX    527 g    Take 17 g by mouth daily STIR 1 CAPFUL (17GM) IN 8 OZ OF LIQUID AND DRINK    Chronic constipation       POTASSIUM CHLORIDE ER PO      Take 20 mEq by mouth 2 times daily        RANITIDINE HCL PO      Take 150 mg by mouth 2 times daily        SUMATRIPTAN SUCCINATE PO       Take 50 mg by mouth once Take 50 mg (1 tablet) at onset of headache, may repeat in 2 hours if needed        VITAMIN C ER PO      Take 1,000 mg by mouth 3 times daily        ZINC PO      Take 60 mg by mouth daily

## 2018-11-29 ENCOUNTER — TELEPHONE (OUTPATIENT)
Dept: CARDIOLOGY | Facility: CLINIC | Age: 48
End: 2018-11-29

## 2018-11-29 NOTE — TELEPHONE ENCOUNTER
"Patient is difficult to get a decent history on.  She sometimes speaks very slowly and at other times rushes through what she says.  She can also be rude if she doesn't like what is said.  She was given Entyvio IV about two months ago after being diagnosed with Crohn's disease.  She says that since that time she hasn't felt well.  She says she was hospitalized at [facility] on 11/15 and given heparin for 3 days.  She says that she felt better while getting that, but they discharged her off of heparin and now she is back to feeling poorly.  She says that she has a clot in her right jugular vein, and doctors are telling her that it is an old clot because they saw collaterals, but she thinks she has a new clot there too.  She has been seen at the [facility], and today was seen by the vascular doctor at [facility] who told her she was fine, but she doesn't think she is.  She does use medical marijuana (?) and smokes cannibis for chronic pain.  She also says that she has prominent veins on her right breast and arm.  She saw her OB-GYN regarding that on 11/16/18 (Dana Vance MD), in EP[facility] Encounters.  Was sent to scheduling as she wanted to see a \"vascular doctor\".  The  (Yelitza Bashir) gave her the number to the vascular department on 3rd floor above our clinic, because she does not have cardiac issues.    JONES Domínguez 11/29/2018    "

## 2019-11-04 ENCOUNTER — OFFICE VISIT (OUTPATIENT)
Dept: DERMATOLOGY | Facility: CLINIC | Age: 49
End: 2019-11-04
Payer: MEDICARE

## 2019-11-04 VITALS — SYSTOLIC BLOOD PRESSURE: 114 MMHG | OXYGEN SATURATION: 98 % | DIASTOLIC BLOOD PRESSURE: 63 MMHG | HEART RATE: 65 BPM

## 2019-11-04 DIAGNOSIS — L70.0 ACNE VULGARIS: Primary | ICD-10-CM

## 2019-11-04 PROCEDURE — 99203 OFFICE O/P NEW LOW 30 MIN: CPT | Performed by: DERMATOLOGY

## 2019-11-04 RX ORDER — FLUCONAZOLE 100 MG/1
TABLET ORAL
Qty: 2 TABLET | Refills: 3 | Status: SHIPPED | OUTPATIENT
Start: 2019-11-04 | End: 2022-02-10

## 2019-11-04 RX ORDER — AMPICILLIN TRIHYDRATE 500 MG
500 CAPSULE ORAL
Qty: 60 CAPSULE | Refills: 3 | Status: SHIPPED | OUTPATIENT
Start: 2019-11-04 | End: 2020-04-07

## 2019-11-04 RX ORDER — TRETINOIN 0.5 MG/G
CREAM TOPICAL AT BEDTIME
Qty: 20 G | Refills: 3 | Status: SHIPPED | OUTPATIENT
Start: 2019-11-04 | End: 2020-02-27

## 2019-11-04 NOTE — NURSING NOTE
Chief Complaint   Patient presents with     Acne       Vitals:    11/04/19 1322   BP: 114/63   Pulse: 65   SpO2: 98%     Wt Readings from Last 1 Encounters:   11/16/18 62.1 kg (137 lb)       Betty Mendez LPN.................11/4/2019

## 2019-11-04 NOTE — PROGRESS NOTES
Piper Long is a 49 year old year old female patient here today for acne. .  Patient states this has been present for years.  Patient reports the following symptoms:  Pimples.  .  Patient reports the following previous treatments clinda gel and doxy.  Does not get menses, had ablation.  Pimples on t zone.  .  Patient reports the following modifying factors none.  Associated symptoms: none.  Patient has no other skin complaints today.  Remainder of the HPI, Meds, PMH, Allergies, FH, and SH was reviewed in chart.      Past Medical History:   Diagnosis Date     Crohn's colitis (H) 1999    [facility]. Has been to the [facility] for consultion. Has tried Imuran, Remicade, Humira, Methotrexate     Menorrhagia 6/7/2013     Partial small bowel obstruction (H) 5/17/2013     PONV (postoperative nausea and vomiting)      SBO (small bowel obstruction) (H) 2/4/2013       Past Surgical History:   Procedure Laterality Date     ABDOMEN SURGERY  2013    x 3 stricture removal.      CHOLECYSTECTOMY, LAPOROSCOPIC       COLONOSCOPY  05/13/10     DILATION AND CURETTAGE, HYSTEROSCOPY, ABLATE ENDOMETRIUM NOVASURE, COMBINED  12/11/2012    Procedure: COMBINED DILATION AND CURETTAGE, HYSTEROSCOPY, ABLATE ENDOMETRIUM NOVASURE;  Hysteroscopy, Dilataion and Curettage with Endometrial Ablation,Novasure;  Surgeon: Dana Vance MD;  Location: [facility] OR     INSERT PORT VASCULAR ACCESS  3/6/2014    Procedure: INSERT PORT VASCULAR ACCESS;  Power Port Placement;  Surgeon: Oneal Stephens MD;  Location: [facility] OR     SURGICAL HISTORY OF -   1992    rhinoplasty      TUBAL LIGATION  1997        Family History   Problem Relation Age of Onset     Hypertension Father      C.A.D. Father      Cardiovascular Father      Cerebrovascular Disease Father      Allergies Son      Breast Cancer Mother        Social History     Socioeconomic History     Marital status:      Spouse name: Not on file     Number of children: Not  on file     Years of education: Not on file     Highest education level: Not on file   Occupational History     Not on file   Social Needs     Financial resource strain: Not on file     Food insecurity:     Worry: Not on file     Inability: Not on file     Transportation needs:     Medical: Not on file     Non-medical: Not on file   Tobacco Use     Smoking status: Never Smoker     Smokeless tobacco: Never Used   Substance and Sexual Activity     Alcohol use: Yes     Comment: couple drinks couple times a week     Drug use: Yes     Comment: some medicianl marijuana     Sexual activity: Yes     Partners: Male     Birth control/protection: Surgical, Condom   Lifestyle     Physical activity:     Days per week: Not on file     Minutes per session: Not on file     Stress: Not on file   Relationships     Social connections:     Talks on phone: Not on file     Gets together: Not on file     Attends Sikh service: Not on file     Active member of club or organization: Not on file     Attends meetings of clubs or organizations: Not on file     Relationship status: Not on file     Intimate partner violence:     Fear of current or ex partner: Not on file     Emotionally abused: Not on file     Physically abused: Not on file     Forced sexual activity: Not on file   Other Topics Concern     Parent/sibling w/ CABG, MI or angioplasty before 65F 55M? Yes     Comment: father had CAD      Service No     Blood Transfusions No     Caffeine Concern No     Occupational Exposure No     Hobby Hazards No     Sleep Concern No     Stress Concern Yes     Weight Concern No     Special Diet Yes     Back Care No     Exercise Yes     Comment: walking, stretching     Bike Helmet No     Seat Belt Yes     Self-Exams No   Social History Narrative     Not on file       Outpatient Encounter Medications as of 11/4/2019   Medication Sig Dispense Refill     Ascorbic Acid (VITAMIN C ER PO) Take 1,000 mg by mouth 3 times daily        Black Cohosh  160 MG CAPS Take 2 capsules by mouth       calcium carbonate (OS-TAYLOR 600 MG [place]. CA) 600 MG tablet Take 1 tablet by mouth 2 times daily (with meals)       cholecalciferol (VITAMIN D3) 78815 UNITS capsule Take 1 capsule by mouth daily       cyanocolbalamin (VITAMIN  B-12) 500 MCG tablet Take 500 mcg by mouth 2 times daily       doxycycline (VIBRA-TABS) 100 MG tablet Take 100 mg by mouth 2 times daily       HYDROcodone-acetaminophen (NORCO) 5-325 MG per tablet Take 1 tablet by mouth every 6 hours as needed for moderate to severe pain 12 tablet 0     Multiple Vitamins-Minerals (ZINC PO) Take 60 mg by mouth daily       Nutritional Supplements (ENSURE PLUS) LIQD Take by mouth 3 times daily (with meals)       Omega-3 Fatty Acids (FISH OIL OMEGA-3 PO)        ondansetron (ZOFRAN) 8 MG tablet Take 3 tablets (24 mg) by mouth every 8 hours as needed for nausea Place on all labels pt needs an appointment for further refills please schedule. 36 tablet 5     polyethylene glycol (MIRALAX) powder Take 17 g by mouth daily STIR 1 CAPFUL (17GM) IN 8 OZ OF LIQUID AND DRINK (Patient taking differently: Take 17 g by mouth daily STIR 1 CAPFUL (17GM) IN 8 OZ OF LIQUID AND DRINK  May repeat up to QID per pt request) 527 g 0     POTASSIUM CHLORIDE ER PO Take 20 mEq by mouth 2 times daily       SUMATRIPTAN SUCCINATE PO Take 50 mg by mouth once Take 50 mg (1 tablet) at onset of headache, may repeat in 2 hours if needed       [DISCONTINUED] cyclobenzaprine (FLEXERIL) 5 MG tablet Take 1 tablet (5 mg) by mouth nightly as needed for muscle spasms (Patient not taking: Reported on 8/23/2018) 30 tablet 0     [DISCONTINUED] DULoxetine 40 MG CPEP Take 40 mg by mouth daily (Patient not taking: Reported on 10/4/2018) 30 capsule 0     [DISCONTINUED] fluconazole (DIFLUCAN) 150 MG tablet Take 1 tablet today, repeat treatment in 48-72 hrs (Patient not taking: Reported on 10/4/2018) 2 tablet 0     [DISCONTINUED] LORazepam (ATIVAN) 0.5 MG tablet Take 1 tablet  (0.5 mg) by mouth daily as needed for anxiety (Patient not taking: Reported on 8/23/2018) 30 tablet 0     [DISCONTINUED] RANITIDINE HCL PO Take 150 mg by mouth 2 times daily       No facility-administered encounter medications on file as of 11/4/2019.              Review Of Systems  Skin: As above  Eyes: negative  Ears/Nose/Throat: negative  Respiratory: No shortness of breath, dyspnea on exertion, cough, or hemoptysis  Cardiovascular: negative  Gastrointestinal: negative  Genitourinary: negative  Musculoskeletal: negative  Neurologic: negative  Psychiatric: negative  Hematologic/Lymphatic/Immunologic: negative  Endocrine: negative      O:   NAD, WDWN, Alert & Oriented, Mood & Affect wnl, Vitals stable   Here today alone   /63   Pulse 65   SpO2 98%    General appearance normal   Vitals stable   Alert, oriented and in no acute distress      inflammatorypapules on face, cheeks and chin     The remainder of expanded problem focused exam was unremarkable; the following areas were examined:  scalp/hair, conjunctiva/lids, face, neck, lips, chest, digits/nails, RUE, LUE.      Eyes: Conjunctivae/lids:Normal     ENT: Lips, buccal mucosa, tongue: normal    MSK:Normal    Cardiovascular: peripheral edema none    Pulm: Breathing Normal    Lymph Nodes: No Head and Neck Lymphadenopathy     Neuro/Psych: Orientation:Normal; Mood/Affect:Normal      A/P:  1. Acne  Acne vulgaris  Has hx of Chorns and GI distres also with aldactone  Doxy causes yeast infection    Pathophysiology discussed with pateint and information provided   I discussed with patient Oral Abx, Aldactone, Topical creams, light therapies and OCT  Treating acne is preventative    Acne can be effectively treated, although response may sometimes be slow.   Where possible, avoid excessively humid conditions such as a sauna, working in an unventilated kitchen or tropical vacations.   If you smoke, stop. Nicotine increases sebum retention and increased scale within  the follicles, forming comedones (black and whiteheads).    Minimize the application of oils and cosmetics to the affected skin.   Abrasive skin treatments can aggravate acne.   Try not to scratch or pick the spots.   To avoid sunburn, protect your skin outdoors using a sunscreen and protective clothing.  No relationship between particular foods and acne has been proven. However, reports suggest low glycemic and low dairy diet are helpful for some people.    May take 3-4 months to see 50% improvement  May get worse during initial phase of treatment  Tretinoin at bedtime, dryness, irritation and way to prevent discussed with patient   BPO wash daily or every other day depending on dryness  Aggressive use of bland emollients discussed with patient   Ampicillin twice daily GI upset, esophagitis and UV precautions discussed with patient     UV precautions reviewed with patient.  Skin care regimen reviewed with patient: Eliminate harsh soaps, i.e. Dial, zest, irsih spring; Mild soaps such as Cetaphil or Dove sensitive skin, avoid hot or cold showers, aggressive use of emollients including vanicream, cetaphil or cerave discussed with patient.    Return to clinic 3 months

## 2019-11-04 NOTE — LETTER
11/4/2019         RE: Piper Long  [address]  [place] [address]        Dear Colleague,    Thank you for referring your patient, Piper Long, to the [facility]. Please see a copy of my visit note below.    Piper Long is a 49 year old year old female patient here today for acne. .  Patient states this has been present for years.  Patient reports the following symptoms:  Pimples.  .  Patient reports the following previous treatments clinda gel and doxy.  Does not get menses, had ablation.  Pimples on t zone.  .  Patient reports the following modifying factors none.  Associated symptoms: none.  Patient has no other skin complaints today.  Remainder of the HPI, Meds, PMH, Allergies, FH, and SH was reviewed in chart.      Past Medical History:   Diagnosis Date     Crohn's colitis (H) 1999    [facility]. Has been to the [facility] for consultion. Has tried Imuran, Remicade, Humira, Methotrexate     Menorrhagia 6/7/2013     Partial small bowel obstruction (H) 5/17/2013     PONV (postoperative nausea and vomiting)      SBO (small bowel obstruction) (H) 2/4/2013       Past Surgical History:   Procedure Laterality Date     ABDOMEN SURGERY  2013    x 3 stricture removal.      CHOLECYSTECTOMY, LAPOROSCOPIC       COLONOSCOPY  05/13/10     DILATION AND CURETTAGE, HYSTEROSCOPY, ABLATE ENDOMETRIUM NOVASURE, COMBINED  12/11/2012    Procedure: COMBINED DILATION AND CURETTAGE, HYSTEROSCOPY, ABLATE ENDOMETRIUM NOVASURE;  Hysteroscopy, Dilataion and Curettage with Endometrial Ablation,Novasure;  Surgeon: Dana Vance MD;  Location: [facility] OR     INSERT PORT VASCULAR ACCESS  3/6/2014    Procedure: INSERT PORT VASCULAR ACCESS;  Power Port Placement;  Surgeon: Oneal Stephens MD;  Location: [facility]     SURGICAL HISTORY OF -   1992    rhinoplasty      TUBAL LIGATION  1997        Family History   Problem Relation Age of Onset     Hypertension Father      C.A.D. Father       Cardiovascular Father      Cerebrovascular Disease Father      Allergies Son      Breast Cancer Mother        Social History     Socioeconomic History     Marital status:      Spouse name: Not on file     Number of children: Not on file     Years of education: Not on file     Highest education level: Not on file   Occupational History     Not on file   Social Needs     Financial resource strain: Not on file     Food insecurity:     Worry: Not on file     Inability: Not on file     Transportation needs:     Medical: Not on file     Non-medical: Not on file   Tobacco Use     Smoking status: Never Smoker     Smokeless tobacco: Never Used   Substance and Sexual Activity     Alcohol use: Yes     Comment: couple drinks couple times a week     Drug use: Yes     Comment: some medicianl marijuana     Sexual activity: Yes     Partners: Male     Birth control/protection: Surgical, Condom   Lifestyle     Physical activity:     Days per week: Not on file     Minutes per session: Not on file     Stress: Not on file   Relationships     Social connections:     Talks on phone: Not on file     Gets together: Not on file     Attends Roman Catholic service: Not on file     Active member of club or organization: Not on file     Attends meetings of clubs or organizations: Not on file     Relationship status: Not on file     Intimate partner violence:     Fear of current or ex partner: Not on file     Emotionally abused: Not on file     Physically abused: Not on file     Forced sexual activity: Not on file   Other Topics Concern     Parent/sibling w/ CABG, MI or angioplasty before 65F 55M? Yes     Comment: father had CAD      Service No     Blood Transfusions No     Caffeine Concern No     Occupational Exposure No     Hobby Hazards No     Sleep Concern No     Stress Concern Yes     Weight Concern No     Special Diet Yes     Back Care No     Exercise Yes     Comment: walking, stretching     Bike Helmet No     Seat Belt Yes      Self-Exams No   Social History Narrative     Not on file       Outpatient Encounter Medications as of 11/4/2019   Medication Sig Dispense Refill     Ascorbic Acid (VITAMIN C ER PO) Take 1,000 mg by mouth 3 times daily        Black Cohosh 160 MG CAPS Take 2 capsules by mouth       calcium carbonate (OS-TAYLOR 600 MG [place]. [place]) 600 MG tablet Take 1 tablet by mouth 2 times daily (with meals)       cholecalciferol (VITAMIN D3) 24294 UNITS capsule Take 1 capsule by mouth daily       cyanocolbalamin (VITAMIN  B-12) 500 MCG tablet Take 500 mcg by mouth 2 times daily       doxycycline (VIBRA-TABS) 100 MG tablet Take 100 mg by mouth 2 times daily       HYDROcodone-acetaminophen (NORCO) 5-325 MG per tablet Take 1 tablet by mouth every 6 hours as needed for moderate to severe pain 12 tablet 0     Multiple Vitamins-Minerals (ZINC PO) Take 60 mg by mouth daily       Nutritional Supplements (ENSURE PLUS) LIQD Take by mouth 3 times daily (with meals)       Omega-3 Fatty Acids (FISH OIL OMEGA-3 PO)        ondansetron (ZOFRAN) 8 MG tablet Take 3 tablets (24 mg) by mouth every 8 hours as needed for nausea Place on all labels pt needs an appointment for further refills please schedule. 36 tablet 5     polyethylene glycol (MIRALAX) powder Take 17 g by mouth daily STIR 1 CAPFUL (17GM) IN 8 OZ OF LIQUID AND DRINK (Patient taking differently: Take 17 g by mouth daily STIR 1 CAPFUL (17GM) IN 8 OZ OF LIQUID AND DRINK  May repeat up to QID per pt request) 527 g 0     POTASSIUM CHLORIDE ER PO Take 20 mEq by mouth 2 times daily       SUMATRIPTAN SUCCINATE PO Take 50 mg by mouth once Take 50 mg (1 tablet) at onset of headache, may repeat in 2 hours if needed       [DISCONTINUED] cyclobenzaprine (FLEXERIL) 5 MG tablet Take 1 tablet (5 mg) by mouth nightly as needed for muscle spasms (Patient not taking: Reported on 8/23/2018) 30 tablet 0     [DISCONTINUED] DULoxetine 40 MG CPEP Take 40 mg by mouth daily (Patient not taking: Reported on 10/4/2018) 30  capsule 0     [DISCONTINUED] fluconazole (DIFLUCAN) 150 MG tablet Take 1 tablet today, repeat treatment in 48-72 hrs (Patient not taking: Reported on 10/4/2018) 2 tablet 0     [DISCONTINUED] LORazepam (ATIVAN) 0.5 MG tablet Take 1 tablet (0.5 mg) by mouth daily as needed for anxiety (Patient not taking: Reported on 8/23/2018) 30 tablet 0     [DISCONTINUED] RANITIDINE HCL PO Take 150 mg by mouth 2 times daily       No facility-administered encounter medications on file as of 11/4/2019.              Review Of Systems  Skin: As above  Eyes: negative  Ears/Nose/Throat: negative  Respiratory: No shortness of breath, dyspnea on exertion, cough, or hemoptysis  Cardiovascular: negative  Gastrointestinal: negative  Genitourinary: negative  Musculoskeletal: negative  Neurologic: negative  Psychiatric: negative  Hematologic/Lymphatic/Immunologic: negative  Endocrine: negative      O:   NAD, WDWN, Alert & Oriented, Mood & Affect wnl, Vitals stable   Here today alone   /63   Pulse 65   SpO2 98%    General appearance normal   Vitals stable   Alert, oriented and in no acute distress      inflammatorypapules on face, cheeks and chin     The remainder of expanded problem focused exam was unremarkable; the following areas were examined:  scalp/hair, conjunctiva/lids, face, neck, lips, chest, digits/nails, RUE, LUE.      Eyes: Conjunctivae/lids:Normal     ENT: Lips, buccal mucosa, tongue: normal    MSK:Normal    Cardiovascular: peripheral edema none    Pulm: Breathing Normal    Lymph Nodes: No Head and Neck Lymphadenopathy     Neuro/Psych: Orientation:Normal; Mood/Affect:Normal      A/P:  1. Acne  Acne vulgaris  Has hx of Chorns and GI distres also with aldactone  Doxy causes yeast infection    Pathophysiology discussed with pateint and information provided   I discussed with patient Oral Abx, Aldactone, Topical creams, light therapies and OCT  Treating acne is preventative    Acne can be effectively treated, although response  may sometimes be slow.   Where possible, avoid excessively humid conditions such as a sauna, working in an unventilated kitchen or tropical vacations.   If you smoke, stop. Nicotine increases sebum retention and increased scale within the follicles, forming comedones (black and whiteheads).    Minimize the application of oils and cosmetics to the affected skin.   Abrasive skin treatments can aggravate acne.   Try not to scratch or pick the spots.   To avoid sunburn, protect your skin outdoors using a sunscreen and protective clothing.  No relationship between particular foods and acne has been proven. However, reports suggest low glycemic and low dairy diet are helpful for some people.    May take 3-4 months to see 50% improvement  May get worse during initial phase of treatment  Tretinoin at bedtime, dryness, irritation and way to prevent discussed with patient   BPO wash daily or every other day depending on dryness  Aggressive use of bland emollients discussed with patient   Ampicillin twice daily GI upset, esophagitis and UV precautions discussed with patient     UV precautions reviewed with patient.  Skin care regimen reviewed with patient: Eliminate harsh soaps, i.e. Dial, zest, irsih spring; Mild soaps such as Cetaphil or Dove sensitive skin, avoid hot or cold showers, aggressive use of emollients including vanicream, cetaphil or cerave discussed with patient.    Return to clinic 3 months      Again, thank you for allowing me to participate in the care of your patient.        Sincerely,        Dale Nix MD

## 2019-11-04 NOTE — PATIENT INSTRUCTIONS
**Start the new prescription antibiotic Ampicillin as directed.  **Start the prescription cream at bedtime  **Continue your face wash daily  **Follow up in 3 months with Dr. CHLOE Nix

## 2019-11-05 ENCOUNTER — HEALTH MAINTENANCE LETTER (OUTPATIENT)
Age: 49
End: 2019-11-05

## 2020-01-06 ENCOUNTER — TELEPHONE (OUTPATIENT)
Dept: DERMATOLOGY | Facility: CLINIC | Age: 50
End: 2020-01-06

## 2020-01-06 DIAGNOSIS — B37.9 CANDIDA ALBICANS INFECTION: Primary | ICD-10-CM

## 2020-01-06 NOTE — TELEPHONE ENCOUNTER
Reason for Call:  Other medication    Detailed comments: Pt states antibiotic has caused her to have a yeast infection, can she get some medication for that? Call to [facility] in [place]    Phone Number Patient can be reached at: Home number on file [phone]    Best Time: today    Can we leave a detailed message on this number? YES    Call taken on 1/6/2020 at 2:32 PM by Diana Evans

## 2020-01-07 RX ORDER — FLUCONAZOLE 100 MG/1
150 TABLET ORAL DAILY
Qty: 5 TABLET | Refills: 3 | Status: SHIPPED | OUTPATIENT
Start: 2020-01-07 | End: 2022-02-10

## 2020-01-07 NOTE — TELEPHONE ENCOUNTER
Please clarify Diflucan directions.     Take 1.5 tabs for how many days please?...    Rowan Lutz RN

## 2020-01-07 NOTE — TELEPHONE ENCOUNTER
Can you clarify for how many days to take Diflucan? Rx sent was for #5 with directions of take 1.5 tabs daily...    Thank you, Rowan Lutz RN

## 2020-02-16 ENCOUNTER — HEALTH MAINTENANCE LETTER (OUTPATIENT)
Age: 50
End: 2020-02-16

## 2020-02-27 DIAGNOSIS — L70.0 ACNE VULGARIS: ICD-10-CM

## 2020-02-27 RX ORDER — TRETINOIN 0.5 MG/G
CREAM TOPICAL AT BEDTIME
Qty: 20 G | Refills: 3 | Status: SHIPPED | OUTPATIENT
Start: 2020-02-27 | End: 2020-07-28

## 2020-02-27 NOTE — TELEPHONE ENCOUNTER
Requested Prescriptions   Pending Prescriptions Disp Refills     tretinoin (RETIN-A) 0.05 % external cream 20 g 3     Sig: Apply topically At Bedtime       There is no refill protocol information for this order        Last Written Prescription Date:    Last Fill Quantity: ,  # refills:    Last office visit: 11/4/2019 with prescribing provider:  Dinah   Future Office Visit:   Next 5 appointments (look out 90 days)    Mar 17, 2020  1:00 PM CDT  Return Visit with Dale Nix MD  [facility] ([facility] [address] [place] [address]  [phone]

## 2020-04-07 DIAGNOSIS — L70.0 ACNE VULGARIS: ICD-10-CM

## 2020-04-07 NOTE — TELEPHONE ENCOUNTER
Requested Prescriptions   Pending Prescriptions Disp Refills     ampicillin (PRINCIPEN) 500 MG capsule 60 capsule 3     Sig: Take 1 capsule (500 mg) by mouth two times daily       There is no refill protocol information for this order        Last office visit: 11/4/2019 with prescribing provider:  Dr. Nix   Future Office Visit:   Next 5 appointments (look out 90 days)    Apr 14, 2020  1:00 PM CDT  Return Visit with Dale Nix MD  [facility] [address] [place] [id number] [phone]            Denise Behrendt  Specialty CSS

## 2020-04-07 NOTE — TELEPHONE ENCOUNTER
Has 4-14-20 recheck visit scheduled with Dr. Nix. Spoke to patient and this is a rescheduled office visit from 3-4-20.     She wanted to be seen in person, but due to Covid-19 pandemic, needs either a Virtual or Telephone office visit as this is not considered an essential visit.     Patient verbalized understanding. Transferred to change appt to either a virtual or Telephone visit.    She would like a refill to cover until she has f/u visit next week.    Rowan Lutz RN

## 2020-04-08 RX ORDER — AMPICILLIN TRIHYDRATE 500 MG
500 CAPSULE ORAL
Qty: 60 CAPSULE | Refills: 0 | Status: SHIPPED | OUTPATIENT
Start: 2020-04-08 | End: 2020-04-23

## 2020-04-23 ENCOUNTER — VIRTUAL VISIT (OUTPATIENT)
Dept: DERMATOLOGY | Facility: CLINIC | Age: 50
End: 2020-04-23
Payer: MEDICARE

## 2020-04-23 ENCOUNTER — TELEPHONE (OUTPATIENT)
Dept: DERMATOLOGY | Facility: CLINIC | Age: 50
End: 2020-04-23

## 2020-04-23 DIAGNOSIS — L70.0 ACNE VULGARIS: ICD-10-CM

## 2020-04-23 PROCEDURE — 99441 ZZC PHYSICIAN TELEPHONE EVALUATION 5-10 MIN: CPT | Performed by: DERMATOLOGY

## 2020-04-23 RX ORDER — AMPICILLIN TRIHYDRATE 500 MG
500 CAPSULE ORAL
Qty: 60 CAPSULE | Refills: 0 | Status: SHIPPED | OUTPATIENT
Start: 2020-04-23 | End: 2020-04-27

## 2020-04-23 RX ORDER — TAZAROTENE 0.05 MG/G
CREAM CUTANEOUS
Qty: 60 G | Refills: 3 | Status: SHIPPED | OUTPATIENT
Start: 2020-04-23 | End: 2022-02-10

## 2020-04-23 NOTE — TELEPHONE ENCOUNTER
Prior Authorization Retail Medication Request    Medication/Dose: tazarotene (TAZORAC) 0.05 % external cream  ICD code (if different than what is on RX):  Acne vulgaris [L70.0]   Previously Tried and Failed:    Rationale:      Insurance Name:  Medicare/ [place]  Insurance ID:        Pharmacy Information (if different than what is on RX)  Name:  [facility]  Phone:  [phone]  [facility]: [id number]

## 2020-04-23 NOTE — PROGRESS NOTES
"Piper Long is a 49 year old female who is being evaluated via a billable telephone visit.      The patient has been notified of following:     \"This telephone visit will be conducted via a call between you and your physician/provider. We have found that certain health care needs can be provided without the need for a physical exam.  This service lets us provide the care you need with a short phone conversation.  If a prescription is necessary we can send it directly to your pharmacy.  If lab work is needed we can place an order for that and you can then stop by our lab to have the test done at a later time.    Telephone visits are billed at different rates depending on your insurance coverage. During this emergency period, for some insurers they may be billed the same as an in-person visit.  Please reach out to your insurance provider with any questions.    If during the course of the call the physician/provider feels a telephone visit is not appropriate, you will not be charged for this service.\"    Patient has given verbal consent for Telephone visit?  Yes    How would you like to obtain your AVS? Mail a copy   Please call cell phone: [phone]      Dale Nix MD    Piper Long is a 49 year old year old female patient here today for acne.   Things have improved still has some issues on t zone, but overall better.  She notes her skin is oily and treitnoin is not drying.  Patient has no other skin complaints today.  Remainder of the HPI, Meds, PMH, Allergies, FH, and SH was reviewed in chart.    Past Medical History:   Diagnosis Date     Crohn's colitis (H) 1999    [facility] [place]. Has been to the [facility] for consultion. Has tried Imuran, Remicade, Humira, Methotrexate     Menorrhagia 6/7/2013     Partial small bowel obstruction (H) 5/17/2013     PONV (postoperative nausea and vomiting)      SBO (small bowel obstruction) (H) 2/4/2013       Past Surgical History: "   Procedure Laterality Date     ABDOMEN SURGERY  2013    x 3 stricture removal.      CHOLECYSTECTOMY, LAPOROSCOPIC       COLONOSCOPY  05/13/10     DILATION AND CURETTAGE, HYSTEROSCOPY, ABLATE ENDOMETRIUM NOVASURE, COMBINED  12/11/2012    Procedure: COMBINED DILATION AND CURETTAGE, HYSTEROSCOPY, ABLATE ENDOMETRIUM NOVASURE;  Hysteroscopy, Dilataion and Curettage with Endometrial Ablation,Novasure;  Surgeon: Dana Vance MD;  Location: [facility]     INSERT PORT VASCULAR ACCESS  3/6/2014    Procedure: INSERT PORT VASCULAR ACCESS;  Power Port Placement;  Surgeon: Oneal Stephens MD;  Location: [facility]     SURGICAL HISTORY OF -   1992    rhinoplasty      TUBAL LIGATION  1997        Family History   Problem Relation Age of Onset     Hypertension Father      C.A.D. Father      Cardiovascular Father      Cerebrovascular Disease Father      Allergies Son      Breast Cancer Mother        Social History     Socioeconomic History     Marital status:      Spouse name: Not on file     Number of children: Not on file     Years of education: Not on file     Highest education level: Not on file   Occupational History     Not on file   Social Needs     Financial resource strain: Not on file     Food insecurity     Worry: Not on file     Inability: Not on file     Transportation needs     Medical: Not on file     Non-medical: Not on file   Tobacco Use     Smoking status: Never Smoker     Smokeless tobacco: Never Used   Substance and Sexual Activity     Alcohol use: Yes     Comment: couple drinks couple times a week     Drug use: Yes     Comment: some medicianl marijuana     Sexual activity: Yes     Partners: Male     Birth control/protection: Surgical, Condom   Lifestyle     Physical activity     Days per week: Not on file     Minutes per session: Not on file     Stress: Not on file   Relationships     Social connections     Talks on phone: Not on file     Gets together: Not on file     Attends Jewish service: Not on  file     Active member of club or organization: Not on file     Attends meetings of clubs or organizations: Not on file     Relationship status: Not on file     Intimate partner violence     Fear of current or ex partner: Not on file     Emotionally abused: Not on file     Physically abused: Not on file     Forced sexual activity: Not on file   Other Topics Concern     Parent/sibling w/ CABG, MI or angioplasty before 65F 55M? Yes     Comment: father had CAD      Service No     Blood Transfusions No     Caffeine Concern No     Occupational Exposure No     Hobby Hazards No     Sleep Concern No     Stress Concern Yes     Weight Concern No     Special Diet Yes     Back Care No     Exercise Yes     Comment: walking, stretching     Bike Helmet No     Seat Belt Yes     Self-Exams No   Social History Narrative     Not on file       Outpatient Encounter Medications as of 4/23/2020   Medication Sig Dispense Refill     ampicillin (PRINCIPEN) 500 MG capsule Take 1 capsule (500 mg) by mouth two times daily 60 capsule 0     Ascorbic Acid (VITAMIN C ER PO) Take 1,000 mg by mouth 3 times daily        Black Cohosh 160 MG CAPS Take 2 capsules by mouth       calcium carbonate (OS-TAYLOR 600 MG [place]. [place]) 600 MG tablet Take 1 tablet by mouth 2 times daily (with meals)       cholecalciferol (VITAMIN D3) 66856 UNITS capsule Take 1 capsule by mouth daily       cyanocolbalamin (VITAMIN  B-12) 500 MCG tablet Take 500 mcg by mouth 2 times daily       doxycycline (VIBRA-TABS) 100 MG tablet Take 100 mg by mouth 2 times daily       HYDROcodone-acetaminophen (NORCO) 5-325 MG per tablet Take 1 tablet by mouth every 6 hours as needed for moderate to severe pain 12 tablet 0     Multiple Vitamins-Minerals (ZINC PO) Take 60 mg by mouth daily       Nutritional Supplements (ENSURE PLUS) LIQD Take by mouth 3 times daily (with meals)       Omega-3 Fatty Acids (FISH OIL OMEGA-3 PO)        ondansetron (ZOFRAN) 8 MG tablet Take 3 tablets (24 mg) by  mouth every 8 hours as needed for nausea Place on all labels pt needs an appointment for further refills please schedule. 36 tablet 5     polyethylene glycol (MIRALAX) powder Take 17 g by mouth daily STIR 1 CAPFUL (17GM) IN 8 OZ OF LIQUID AND DRINK (Patient taking differently: Take 17 g by mouth daily STIR 1 CAPFUL (17GM) IN 8 OZ OF LIQUID AND DRINK  May repeat up to QID per pt request) 527 g 0     POTASSIUM CHLORIDE ER PO Take 20 mEq by mouth 2 times daily       SUMATRIPTAN SUCCINATE PO Take 50 mg by mouth once Take 50 mg (1 tablet) at onset of headache, may repeat in 2 hours if needed       tretinoin (RETIN-A) 0.05 % external cream Apply topically At Bedtime 20 g 3     fluconazole (DIFLUCAN) 100 MG tablet Take 1.5 tablets (150 mg) by mouth daily (Patient not taking: Reported on 4/23/2020) 5 tablet 3     fluconazole (DIFLUCAN) 100 MG tablet As neeed 1.5 tablets for yeast infection (Patient not taking: Reported on 4/23/2020) 2 tablet 3     No facility-administered encounter medications on file as of 4/23/2020.              Review Of Systems  Skin: As above  Eyes: negative  Ears/Nose/Throat: negative  Respiratory: No shortness of breath, dyspnea on exertion, cough, or hemoptysis  Cardiovascular: negative  Gastrointestinal: negative  Genitourinary: negative  Musculoskeletal: negative  Neurologic: negative  Psychiatric: negative  Hematologic/Lymphatic/Immunologic: negative  Endocrine: negative      O:   Alert & Orientedx3, Mood & Affect wnl,    General appearance normal   Alert, oriented and in no acute distress    Comedones on face per patient      Pulm: Breathing Normal, talking in normal sentences, no shortness of breath during conversation    Neuro/Psych: Orientation:Alert and Orientedx3 ; Mood/Affect:normal ; no anxiety or depression       A/P:  1. Acne  Acne vulgaris  Has hx of Chorns and GI distres also with aldactone  Doxy causes yeast infection    Cont bpo wash, ampicillin  inrease to tazorac  Return to clinic  3 months  Skin care regimen reviewed with patient: Eliminate harsh soaps, i.e. Dial, zest, irsih spring; Mild soaps such as Cetaphil or Dove sensitive skin, avoid hot or cold showers, aggressive use of emollients including vanicream, cetaphil or cerave discussed with patient.    Teledermatology information:  - Location of patient: home  - Location of teledermatologist: home   - Reason teledermatology is appropriate: of National Emergency Regarding Coronavirus disease (COVID 19) Outbreak  - The patient's condition can safely be assessed using telemedicine: yes  - Method of transmission: store and forward teledermatology  - Image quality and interpretability:n/a  - Physician has received verbal consent for a Video/Photos Visit from the patient? Yes  - In-person dermatology visit recommendation: no  - Service start time:115am/pm  - Service end time:122am/pm  - Date of report: 04/23/20

## 2020-04-27 DIAGNOSIS — L70.0 ACNE VULGARIS: ICD-10-CM

## 2020-04-27 RX ORDER — AMPICILLIN TRIHYDRATE 500 MG
500 CAPSULE ORAL
Qty: 60 CAPSULE | Refills: 2 | Status: SHIPPED | OUTPATIENT
Start: 2020-04-27 | End: 2021-01-20

## 2020-04-27 NOTE — TELEPHONE ENCOUNTER
Refilled per LOV note from provider and refill protocol.  Tatianna COOK RN BSN PHN  [facility]

## 2020-04-27 NOTE — TELEPHONE ENCOUNTER
Requested Prescriptions   Pending Prescriptions Disp Refills     ampicillin (PRINCIPEN) 500 MG capsule 60 capsule 0     Sig: Take 1 capsule (500 mg) by mouth two times daily       There is no refill protocol information for this order        Last office visit: 4/23/2020 with prescribing provider:  Dr. Nix   Future Office Visit:        Denise Behrendt  Specialty CSS

## 2020-04-28 NOTE — TELEPHONE ENCOUNTER
PRIOR AUTHORIZATION DENIED    Medication: tazarotene (TAZORAC) 0.05 % external cream-DENIED    Denial Date: 4/23/2020    Denial Rational:           Appeal Information:

## 2020-04-28 NOTE — TELEPHONE ENCOUNTER
Sent denial message to patient via [employer] to notify.  Taryn LEONARD   [facility]

## 2020-05-05 ENCOUNTER — TELEPHONE (OUTPATIENT)
Dept: DERMATOLOGY | Facility: CLINIC | Age: 50
End: 2020-05-05

## 2020-05-05 DIAGNOSIS — L70.0 ACNE VULGARIS: Primary | ICD-10-CM

## 2020-05-05 RX ORDER — FLUCONAZOLE 150 MG/1
150 TABLET ORAL ONCE
Qty: 1 TABLET | Refills: 4 | Status: SHIPPED | OUTPATIENT
Start: 2020-05-05 | End: 2020-05-05

## 2020-05-05 NOTE — TELEPHONE ENCOUNTER
[facility] faxed request asking if  wants to do an appeal or change the medication ?    Isamar Massey  Specialty CSS

## 2020-05-05 NOTE — TELEPHONE ENCOUNTER
Reason for Call:  Other prescription    Detailed comments: pt calling stating she is using ampicillin and tretinoin cream. She has been experiencing yeast infection symptoms x 5 days.     Phone Number Patient can be reached at: Home number on file [phone] (home)    Best Time: any     Can we leave a detailed message on this number? YES    Call taken on 5/5/2020 at 11:28 AM by Isamar Massey

## 2020-07-28 DIAGNOSIS — L70.0 ACNE VULGARIS: ICD-10-CM

## 2020-07-28 RX ORDER — TRETINOIN 0.5 MG/G
CREAM TOPICAL AT BEDTIME
Qty: 20 G | Refills: 3 | Status: SHIPPED | OUTPATIENT
Start: 2020-07-28 | End: 2022-02-10

## 2020-07-28 NOTE — TELEPHONE ENCOUNTER
Requested Prescriptions   Pending Prescriptions Disp Refills     tretinoin (RETIN-A) 0.05 % external cream 20 g 3     Sig: Apply topically At Bedtime       There is no refill protocol information for this order        Last Written Prescription Date:    Last Fill Quantity: ,  # refills:    Last office visit: 11/4/2019 with prescribing provider:  Dinah Macdonald Office Visit:

## 2020-07-28 NOTE — TELEPHONE ENCOUNTER
Looks like she was switched to Tazorac at last office visit on 4-23-20.    Do you want her to stop Tretinoin? Or use both Tretinoin and Tazorac?    Please advise. Rowan Lutz RN

## 2020-08-05 ENCOUNTER — TELEPHONE (OUTPATIENT)
Dept: DERMATOLOGY | Facility: CLINIC | Age: 50
End: 2020-08-05

## 2020-08-05 DIAGNOSIS — B35.9 TINEA: Primary | ICD-10-CM

## 2020-08-05 RX ORDER — FLUCONAZOLE 100 MG/1
150 TABLET ORAL DAILY
Qty: 3 TABLET | Refills: 4 | Status: SHIPPED | OUTPATIENT
Start: 2020-08-05 | End: 2021-01-20

## 2020-08-05 NOTE — TELEPHONE ENCOUNTER
Reason for Call:  Other call back    Detailed comments: pt calling stating she is using tretinoin and ampicillin. She has a yeast infection from medication. Has been trying to do home remodies but nothing is working. Would like to get rx.     Phone Number Patient can be reached at: Home number on file [phone] (home)    Best Time: any     Can we leave a detailed message on this number? YES    Call taken on 8/5/2020 at 10:12 AM by Isamar Massey

## 2020-11-15 ENCOUNTER — TRANSFERRED RECORDS (OUTPATIENT)
Dept: HEALTH INFORMATION MANAGEMENT | Facility: CLINIC | Age: 50
End: 2020-11-15

## 2021-01-20 ENCOUNTER — OFFICE VISIT (OUTPATIENT)
Dept: DERMATOLOGY | Facility: CLINIC | Age: 51
End: 2021-01-20
Payer: MEDICARE

## 2021-01-20 ENCOUNTER — TELEPHONE (OUTPATIENT)
Dept: DERMATOLOGY | Facility: CLINIC | Age: 51
End: 2021-01-20

## 2021-01-20 VITALS — OXYGEN SATURATION: 98 % | DIASTOLIC BLOOD PRESSURE: 76 MMHG | SYSTOLIC BLOOD PRESSURE: 119 MMHG | HEART RATE: 83 BPM

## 2021-01-20 DIAGNOSIS — B35.9 TINEA: ICD-10-CM

## 2021-01-20 DIAGNOSIS — L70.0 ACNE VULGARIS: ICD-10-CM

## 2021-01-20 PROCEDURE — 99213 OFFICE O/P EST LOW 20 MIN: CPT | Performed by: DERMATOLOGY

## 2021-01-20 RX ORDER — TRETINOIN 1 MG/G
CREAM TOPICAL AT BEDTIME
Qty: 45 G | Refills: 3 | Status: SHIPPED | OUTPATIENT
Start: 2021-01-20 | End: 2022-02-10

## 2021-01-20 RX ORDER — AMPICILLIN TRIHYDRATE 500 MG
500 CAPSULE ORAL
Qty: 60 CAPSULE | Refills: 2 | Status: SHIPPED | OUTPATIENT
Start: 2021-01-20 | End: 2022-01-21

## 2021-01-20 RX ORDER — FLUCONAZOLE 100 MG/1
150 TABLET ORAL DAILY
Qty: 3 TABLET | Refills: 7 | Status: SHIPPED | OUTPATIENT
Start: 2021-01-20 | End: 2022-01-21

## 2021-01-20 NOTE — CONFIDENTIAL NOTE
Prior Authorization Retail Medication Request    Medication/Dose:   ICD code (if different than what is on RX):  Acne vulgaris [L70.0]   Previously Tried and Failed:    Rationale:      Insurance Name:  Medicare  Insurance ID:        Pharmacy Information (if different than what is on RX)  Name:  [facility]  Phone:    [facility] Key: [id number]

## 2021-01-20 NOTE — LETTER
1/20/2021         RE: Piper Long  [address]  [place] [address]        Dear Colleague,    Thank you for referring your patient, Piper Long, to the [facility]. Please see a copy of my visit note below.    Piper Long is an extremely pleasant 50 year old year old female patient here today for acne.  She has significant acne on face.  She has hx of Crohns and other co morbidities including depression. She has failed aldactone.  She is currently on hormone therapy [facility].  She notes acneo n face and chin and cysts.  Patient has no other skin complaints today.  Remainder of the HPI, Meds, PMH, Allergies, FH, and SH was reviewed in chart.      Past Medical History:   Diagnosis Date     Crohn's colitis (H) 1999    [facility]. Has been to the [facility] for consultion. Has tried Imuran, Remicade, Humira, Methotrexate     Menorrhagia 6/7/2013     Partial small bowel obstruction (H) 5/17/2013     PONV (postoperative nausea and vomiting)      SBO (small bowel obstruction) (H) 2/4/2013       Past Surgical History:   Procedure Laterality Date     ABDOMEN SURGERY  2013    x 3 stricture removal.      CHOLECYSTECTOMY, LAPOROSCOPIC       COLONOSCOPY  05/13/10     DILATION AND CURETTAGE, HYSTEROSCOPY, ABLATE ENDOMETRIUM NOVASURE, COMBINED  12/11/2012    Procedure: COMBINED DILATION AND CURETTAGE, HYSTEROSCOPY, ABLATE ENDOMETRIUM NOVASURE;  Hysteroscopy, Dilataion and Curettage with Endometrial Ablation,Novasure;  Surgeon: Dana Vance MD;  Location: [facility]     INSERT PORT VASCULAR ACCESS  3/6/2014    Procedure: INSERT PORT VASCULAR ACCESS;  Power Port Placement;  Surgeon: Oneal Stephens MD;  Location: [facility]     SURGICAL HISTORY OF -   1992    rhinoplasty      TUBAL LIGATION  1997        Family History   Problem Relation Age of Onset     Hypertension Father      C.A.D. Father      Cardiovascular Father      Cerebrovascular Disease Father       Allergies Son      Breast Cancer Mother        Social History     Socioeconomic History     Marital status:      Spouse name: Not on file     Number of children: Not on file     Years of education: Not on file     Highest education level: Not on file   Occupational History     Not on file   Social Needs     Financial resource strain: Not on file     Food insecurity     Worry: Not on file     Inability: Not on file     Transportation needs     Medical: Not on file     Non-medical: Not on file   Tobacco Use     Smoking status: Never Smoker     Smokeless tobacco: Never Used   Substance and Sexual Activity     Alcohol use: Yes     Comment: couple drinks couple times a week     Drug use: Yes     Comment: some medicianl marijuana     Sexual activity: Yes     Partners: Male     Birth control/protection: Surgical, Condom   Lifestyle     Physical activity     Days per week: Not on file     Minutes per session: Not on file     Stress: Not on file   Relationships     Social connections     Talks on phone: Not on file     Gets together: Not on file     Attends Confucianist service: Not on file     Active member of club or organization: Not on file     Attends meetings of clubs or organizations: Not on file     Relationship status: Not on file     Intimate partner violence     Fear of current or ex partner: Not on file     Emotionally abused: Not on file     Physically abused: Not on file     Forced sexual activity: Not on file   Other Topics Concern     Parent/sibling w/ CABG, MI or angioplasty before 65F 55M? Yes     Comment: father had CAD      Service No     Blood Transfusions No     Caffeine Concern No     Occupational Exposure No     Hobby Hazards No     Sleep Concern No     Stress Concern Yes     Weight Concern No     Special Diet Yes     Back Care No     Exercise Yes     Comment: walking, stretching     Bike Helmet No     Seat Belt Yes     Self-Exams No   Social History Narrative     Not on file        Outpatient Encounter Medications as of 1/20/2021   Medication Sig Dispense Refill     ampicillin (PRINCIPEN) 500 MG capsule Take 1 capsule (500 mg) by mouth two times daily 60 capsule 2     Ascorbic Acid (VITAMIN C ER PO) Take 1,000 mg by mouth 3 times daily        Black Cohosh 160 MG CAPS Take 2 capsules by mouth       calcium carbonate (OS-TAYLOR 600 MG [place]. CA) 600 MG tablet Take 1 tablet by mouth 2 times daily (with meals)       cholecalciferol (VITAMIN D3) 40288 UNITS capsule Take 1 capsule by mouth daily       cyanocolbalamin (VITAMIN  B-12) 500 MCG tablet Take 500 mcg by mouth 2 times daily       HYDROcodone-acetaminophen (NORCO) 5-325 MG per tablet Take 1 tablet by mouth every 6 hours as needed for moderate to severe pain 12 tablet 0     Multiple Vitamins-Minerals (ZINC PO) Take 60 mg by mouth daily       Nutritional Supplements (ENSURE PLUS) LIQD Take by mouth 3 times daily (with meals)       Omega-3 Fatty Acids (FISH OIL OMEGA-3 PO)        ondansetron (ZOFRAN) 8 MG tablet Take 3 tablets (24 mg) by mouth every 8 hours as needed for nausea Place on all labels pt needs an appointment for further refills please schedule. 36 tablet 5     polyethylene glycol (MIRALAX) powder Take 17 g by mouth daily STIR 1 CAPFUL (17GM) IN 8 OZ OF LIQUID AND DRINK (Patient taking differently: Take 17 g by mouth daily STIR 1 CAPFUL (17GM) IN 8 OZ OF LIQUID AND DRINK  May repeat up to QID per pt request) 527 g 0     POTASSIUM CHLORIDE ER PO Take 20 mEq by mouth 2 times daily       SUMATRIPTAN SUCCINATE PO Take 50 mg by mouth once Take 50 mg (1 tablet) at onset of headache, may repeat in 2 hours if needed       tretinoin (RETIN-A) 0.05 % external cream Apply topically At Bedtime 20 g 3     doxycycline (VIBRA-TABS) 100 MG tablet Take 100 mg by mouth 2 times daily       fluconazole (DIFLUCAN) 100 MG tablet Take 1.5 tablets (150 mg) by mouth daily (Patient not taking: Reported on 1/20/2021) 3 tablet 4     fluconazole (DIFLUCAN)  100 MG tablet Take 1.5 tablets (150 mg) by mouth daily (Patient not taking: Reported on 4/23/2020) 5 tablet 3     fluconazole (DIFLUCAN) 100 MG tablet As neeed 1.5 tablets for yeast infection (Patient not taking: Reported on 4/23/2020) 2 tablet 3     tazarotene (TAZORAC) 0.05 % external cream User every night (Patient not taking: Reported on 1/20/2021) 60 g 3     No facility-administered encounter medications on file as of 1/20/2021.              Review Of Systems  Skin: As above  Eyes: negative  Ears/Nose/Throat: negative  Respiratory: No shortness of breath, dyspnea on exertion, cough, or hemoptysis  Cardiovascular: negative  Gastrointestinal: negative  Genitourinary: negative  Musculoskeletal: negative  Neurologic: negative  Psychiatric: negative  Hematologic/Lymphatic/Immunologic: negative  Endocrine: negative      O:   NAD, WDWN, Alert & Oriented, Mood & Affect wnl, Vitals stable   Here today alone   /76   Pulse 83   SpO2 98%    General appearance normal   Vitals stable   Alert, oriented and in no acute distress     Inflammatory papules on chin and jawline and foreehead      Eyes: Conjunctivae/lids:Normal     ENT: Lips, buccal mucosa, tongue: normal    MSK:Normal    Cardiovascular: peripheral edema none    Pulm: Breathing Normal    Neuro/Psych: Orientation:Alert and Orientedx3 ; Mood/Affect:normal       A/P:  1. Acne  It was a pleasure speaking to Piper Long today.  Options are limited given her intolerance with aldactone, Hx of inflammatory bowl disease and now on homrone therapy  Birth control discussed with patient   Cost is also an ssure for patient   Topical dapsone and finacea not covered   She is going to ask her Womens medicine provider if they are ok with OCT  Increased strenght of tretinoin discussed with patient   Cont ampicillin for now and add diflucan prn   Accutane discussed with patient how with risks of mental health and GI risk I would ask for pysch and GI approval before  starting  She will contact clinic once she hears from Womens health .   Return to clinic 3 months        Again, thank you for allowing me to participate in the care of your patient.        Sincerely,        Dale Nix MD

## 2021-01-20 NOTE — PROGRESS NOTES
Piper Long is an extremely pleasant 50 year old year old female patient here today for acne.  She has significant acne on face.  She has hx of Crohns and other co morbidities including depression. She has failed aldactone.  She is currently on hormone therapy [facility].  She notes acneo n face and chin and cysts.  Patient has no other skin complaints today.  Remainder of the HPI, Meds, PMH, Allergies, FH, and SH was reviewed in chart.      Past Medical History:   Diagnosis Date     Crohn's colitis (H) 1999    [facility] [place]. [facility] the [facility]. Has tried Imuran, Remicade, Humira, Methotrexate     Menorrhagia 6/7/2013     Partial small bowel obstruction (H) 5/17/2013     PONV (postoperative nausea and vomiting)      SBO (small bowel obstruction) (H) 2/4/2013       Past Surgical History:   Procedure Laterality Date     ABDOMEN SURGERY  2013    x 3 stricture removal.      CHOLECYSTECTOMY, LAPOROSCOPIC       COLONOSCOPY  05/13/10     DILATION AND CURETTAGE, HYSTEROSCOPY, ABLATE ENDOMETRIUM NOVASURE, COMBINED  12/11/2012    Procedure: COMBINED DILATION AND CURETTAGE, HYSTEROSCOPY, ABLATE ENDOMETRIUM NOVASURE;  Hysteroscopy, Dilataion and Curettage with Endometrial Ablation,Novasure;  Surgeon: Dana Vance MD;  Location: [facility] OR     INSERT PORT VASCULAR ACCESS  3/6/2014    Procedure: INSERT PORT VASCULAR ACCESS;  Power Port Placement;  Surgeon: Oneal Stephens MD;  Location: [facility] [place]     SURGICAL HISTORY OF -   1992    rhinoplasty      TUBAL LIGATION  1997        Family History   Problem Relation Age of Onset     Hypertension Father      C.A.D. Father      Cardiovascular Father      Cerebrovascular Disease Father      Allergies Son      Breast Cancer Mother        Social History     Socioeconomic History     Marital status:      Spouse name: Not on file     Number of children: Not on file     Years of education: Not on file     Highest education  level: Not on file   Occupational History     Not on file   Social Needs     Financial resource strain: Not on file     Food insecurity     Worry: Not on file     Inability: Not on file     Transportation needs     Medical: Not on file     Non-medical: Not on file   Tobacco Use     Smoking status: Never Smoker     Smokeless tobacco: Never Used   Substance and Sexual Activity     Alcohol use: Yes     Comment: couple drinks couple times a week     Drug use: Yes     Comment: some medicianl marijuana     Sexual activity: Yes     Partners: Male     Birth control/protection: Surgical, Condom   Lifestyle     Physical activity     Days per week: Not on file     Minutes per session: Not on file     Stress: Not on file   Relationships     Social connections     Talks on phone: Not on file     Gets together: Not on file     Attends Orthodox service: Not on file     Active member of club or organization: Not on file     Attends meetings of clubs or organizations: Not on file     Relationship status: Not on file     Intimate partner violence     Fear of current or ex partner: Not on file     Emotionally abused: Not on file     Physically abused: Not on file     Forced sexual activity: Not on file   Other Topics Concern     Parent/sibling w/ CABG, MI or angioplasty before 65F 55M? Yes     Comment: father had CAD      Service No     Blood Transfusions No     Caffeine Concern No     Occupational Exposure No     Hobby Hazards No     Sleep Concern No     Stress Concern Yes     Weight Concern No     Special Diet Yes     Back Care No     Exercise Yes     Comment: walking, stretching     Bike Helmet No     Seat Belt Yes     Self-Exams No   Social History Narrative     Not on file       Outpatient Encounter Medications as of 1/20/2021   Medication Sig Dispense Refill     ampicillin (PRINCIPEN) 500 MG capsule Take 1 capsule (500 mg) by mouth two times daily 60 capsule 2     Ascorbic Acid (VITAMIN C ER PO) Take 1,000 mg by mouth 3  times daily        Black Cohosh 160 MG CAPS Take 2 capsules by mouth       calcium carbonate (OS-TAYLOR 600 MG [place]. CA) 600 MG tablet Take 1 tablet by mouth 2 times daily (with meals)       cholecalciferol (VITAMIN D3) 53936 UNITS capsule Take 1 capsule by mouth daily       cyanocolbalamin (VITAMIN  B-12) 500 MCG tablet Take 500 mcg by mouth 2 times daily       HYDROcodone-acetaminophen (NORCO) 5-325 MG per tablet Take 1 tablet by mouth every 6 hours as needed for moderate to severe pain 12 tablet 0     Multiple Vitamins-Minerals (ZINC PO) Take 60 mg by mouth daily       Nutritional Supplements (ENSURE PLUS) LIQD Take by mouth 3 times daily (with meals)       Omega-3 Fatty Acids (FISH OIL OMEGA-3 PO)        ondansetron (ZOFRAN) 8 MG tablet Take 3 tablets (24 mg) by mouth every 8 hours as needed for nausea Place on all labels pt needs an appointment for further refills please schedule. 36 tablet 5     polyethylene glycol (MIRALAX) powder Take 17 g by mouth daily STIR 1 CAPFUL (17GM) IN 8 OZ OF LIQUID AND DRINK (Patient taking differently: Take 17 g by mouth daily STIR 1 CAPFUL (17GM) IN 8 OZ OF LIQUID AND DRINK  May repeat up to QID per pt request) 527 g 0     POTASSIUM CHLORIDE ER PO Take 20 mEq by mouth 2 times daily       SUMATRIPTAN SUCCINATE PO Take 50 mg by mouth once Take 50 mg (1 tablet) at onset of headache, may repeat in 2 hours if needed       tretinoin (RETIN-A) 0.05 % external cream Apply topically At Bedtime 20 g 3     doxycycline (VIBRA-TABS) 100 MG tablet Take 100 mg by mouth 2 times daily       fluconazole (DIFLUCAN) 100 MG tablet Take 1.5 tablets (150 mg) by mouth daily (Patient not taking: Reported on 1/20/2021) 3 tablet 4     fluconazole (DIFLUCAN) 100 MG tablet Take 1.5 tablets (150 mg) by mouth daily (Patient not taking: Reported on 4/23/2020) 5 tablet 3     fluconazole (DIFLUCAN) 100 MG tablet As neeed 1.5 tablets for yeast infection (Patient not taking: Reported on 4/23/2020) 2 tablet 3      tazarotene (TAZORAC) 0.05 % external cream User every night (Patient not taking: Reported on 1/20/2021) 60 g 3     No facility-administered encounter medications on file as of 1/20/2021.              Review Of Systems  Skin: As above  Eyes: negative  Ears/Nose/Throat: negative  Respiratory: No shortness of breath, dyspnea on exertion, cough, or hemoptysis  Cardiovascular: negative  Gastrointestinal: negative  Genitourinary: negative  Musculoskeletal: negative  Neurologic: negative  Psychiatric: negative  Hematologic/Lymphatic/Immunologic: negative  Endocrine: negative      O:   NAD, WDWN, Alert & Oriented, Mood & Affect wnl, Vitals stable   Here today alone   /76   Pulse 83   SpO2 98%    General appearance normal   Vitals stable   Alert, oriented and in no acute distress     Inflammatory papules on chin and jawline and foreehead      Eyes: Conjunctivae/lids:Normal     ENT: Lips, buccal mucosa, tongue: normal    MSK:Normal    Cardiovascular: peripheral edema none    Pulm: Breathing Normal    Neuro/Psych: Orientation:Alert and Orientedx3 ; Mood/Affect:normal       A/P:  1. Acne  It was a pleasure speaking to Piper Long today.  Options are limited given her intolerance with aldactone, Hx of inflammatory bowl disease and now on homrone therapy  Birth control discussed with patient   Cost is also an ssure for patient   Topical dapsone and finacea not covered   She is going to ask her Womens medicine provider if they are ok with OCT  Increased strenght of tretinoin discussed with patient   Cont ampicillin for now and add diflucan prn   Accutane discussed with patient how with risks of mental health and GI risk I would ask for pysch and GI approval before starting  She will contact clinic once she hears from Womens health .   Return to clinic 3 months

## 2021-01-22 ENCOUNTER — TELEPHONE (OUTPATIENT)
Dept: DERMATOLOGY | Facility: CLINIC | Age: 51
End: 2021-01-22

## 2021-01-22 DIAGNOSIS — L70.0 ACNE VULGARIS: Primary | ICD-10-CM

## 2021-01-22 NOTE — TELEPHONE ENCOUNTER
Prior Authorization Approval    Authorization Effective Date: 1/22/2021  Authorization Expiration Date: 12/31/2021  Medication: tretinoin (RETIN-A) 0.1 % external cream- APPROVED   Approved Dose/Quantity:   Reference #:     Insurance Company: [employer] ([facility]) - Phone [phone] Fax [phone]  Expected CoPay:       CoPay Card Available:      Foundation Assistance Needed:    Which Pharmacy is filling the prescription (Not needed for infusion/clinic administered): [id number] - [place], [place] - [address]  Pharmacy Notified: Yes  Patient Notified:  **Instructed pharmacy to notify patient when script is ready to /ship.**

## 2021-01-22 NOTE — TELEPHONE ENCOUNTER
Prior Authorization Retail Medication Request    Medication/Dose: tretinoin (RETIN-A) 0.1 % external cream  ICD code (if different than what is on RX):  Acne vulgaris  Previously Tried and Failed:    Rationale:      Insurance Name:  Medicare  Insurance ID:        Pharmacy Information (if different than what is on RX)  Name:  [facility]  Phone:    [facility] Key: [id number]

## 2021-01-22 NOTE — TELEPHONE ENCOUNTER
Reason for Call:  Other FYI    Detailed comments: Dr. Cox calling from [place] [facility] stated ok to put pt on oral contraceptive if you wish to do so. Ok to call Dr. Cox if you have any questions. This is any FYI only    Phone Number Patient can be reached at: Other phone number:  [phone]*    Best Time: any     Can we leave a detailed message on this number? YES    Call taken on 1/22/2021 at 10:48 AM by Isamar Massey

## 2021-01-22 NOTE — TELEPHONE ENCOUNTER
Central Prior Authorization Team  Phone: [phone]    PA Initiation    Medication: tretinoin (RETIN-A) 0.1 % external cream  Insurance Company: [employer] ([facility]) - Phone [phone] Fax [phone]  Pharmacy Filling the Rx: [id number] - [place], [place] - [address]  Filling Pharmacy Phone: [phone]  Filling Pharmacy Fax:    Start Date: 1/22/2021

## 2021-01-23 NOTE — TELEPHONE ENCOUNTER
You have been okayed by Dr. Cox to start oral contraceptive medication for your acne.       Do you want to start one? Please let me know and  I will send to pharmacy

## 2021-02-02 NOTE — TELEPHONE ENCOUNTER
"Spoke to patient and she would like to try a BCP. Uses [facility] pharmacy.    \"Please let him know the antibiotic and the cream have worked well so far, but I don't want to be on an antibiotic all the time..\"     Rowan Lutz RN    "

## 2021-02-03 RX ORDER — DROSPIRENONE AND ETHINYL ESTRADIOL 0.02-3(28)
KIT ORAL
Qty: 28 TABLET | Refills: 6 | Status: SHIPPED | OUTPATIENT
Start: 2021-02-03 | End: 2021-08-12

## 2021-02-03 NOTE — TELEPHONE ENCOUNTER
"Spoke to patient.     \"My Mother has had some blood clots, but not me, I do have thoracic outlet syndrome, but I don't think of it as a blood clotting issue-it was 2 years ago and it is cemented in, but I haven't had any issues with it in 2 years..\"     Non-smoker.     [facility] [place] [facility]   Rowan Lutz RN    "

## 2021-08-12 ENCOUNTER — TELEPHONE (OUTPATIENT)
Dept: DERMATOLOGY | Facility: CLINIC | Age: 51
End: 2021-08-12

## 2021-08-12 DIAGNOSIS — L70.0 ACNE VULGARIS: ICD-10-CM

## 2021-08-12 RX ORDER — DROSPIRENONE AND ETHINYL ESTRADIOL 0.02-3(28)
KIT ORAL
Qty: 28 TABLET | Refills: 2 | Status: SHIPPED | OUTPATIENT
Start: 2021-08-12 | End: 2022-04-19

## 2021-08-12 NOTE — TELEPHONE ENCOUNTER
Reason for Call:  Other call back and prescription    Detailed comments: Pt requesting refill Drospirenon - ethinyl ES 3-0 (Generic Ericka)    [facility]    States the pharmacy will be sending in refill request also.  Pt wondering if she needs to make a follow up visit?  States everything is working really good.    Phone Number Patient can be reached at: Home number on file [phone] (home)    Best Time:     Can we leave a detailed message on this number? YES    Call taken on 8/12/2021 at 2:12 PM by Sailaja Sun

## 2021-08-12 NOTE — TELEPHONE ENCOUNTER
Unable to reach patient. Message left that Dr. Nix wanted to see her back in 3 months per 1-20-21 Derm dictation, so I would make a follow up appt since you need to be seen annually anyway.     I will refill the medication for 3 months and she may be seen via either virtual video, phone or in person for follow up. If all is going well at f/u appt, she may then only need to be seen annually which will be up to Dr. Nix.     Rowan Lutz RN

## 2021-08-12 NOTE — LETTER
[facility] [place]  [address] [address]  Phone: [phone]    August 13, 2021    Piper Long                                                                                                                    [address]  [place] [address]            Dear Ms. Long,    We recently provided you with a medication refill. Prescription medications require routine follow-up with your Dermatology Provider.      At this time we ask that: You schedule a routine office visit with your Dermatology Provider to follow your Acne. Per 1-20-21 Dermatology dictation, you were to return in 3 months for a recheck appointment.     Your prescription: Has been refill so you may have time for the above noted follow-up.  Please be seen prior to needing your next refill of medication and you may be seen for follow up via virtual video if you prefer.     Please schedule follow up appointment as soon as possible so you don't go without medication as we are currently booking Dermatology appointments into late September with a very long (>155+) waiting list for a sooner than available Dermatology appointment.     Thank you,      Dale Nix MD/ [facility]

## 2021-08-30 NOTE — TELEPHONE ENCOUNTER
REFERRAL INFORMATION:    Referring Provider:  N/A    Referring Clinic:  N/A    Reason for Visit/Diagnosis: Crohn's Disease      FUTURE VISIT INFORMATION:    Appointment Date: 10/20/2021    Appointment Time: 1:40 PM      NOTES STATUS DETAILS   OFFICE NOTE from Referring Provider N/A    OFFICE NOTE from Other Specialist Care Everywhere/ Received 7/23/2021, 11/19/19 Office visit with Dr. Ibrahima Nicole ([facility])     [facility]:  - 5/1/17, 8/15/15 Office visit with Dr. Jacky Oseguera      [facility] DISCHARGE SUMMARY/  ED VISITS Care Everywhere 4/12/2021, 9/21/2020, 8/29/2020, 7/16/2020 ([facility])    ** More visits in CE   OPERATIVE REPORT N/A    MEDICATION LIST Internal         ENDOSCOPY  Received/ Care Everywhere EGD: 7/20/18, 6/26/17, 1/19/17 ([facility])   EGD: 10/1/15 ([facility])   Capsule Endoscopy: 2/7/14 ([facility])     COLONOSCOPY Care Everywhere 7/20/18 ([facility])    ERCP N/A    EUS N/A    STOOL TESTING N/A    PERTINENT LABS Care Everywhere    PATHOLOGY REPORTS (RELATED) Internal/ Received  7/20/18 ([facility])    IMAGING (CT, MRI, EGD, MRCP, Small Bowel Follow Through/SBT, MR/CT Enterography) Care Everywhere [facility]:  - CT Abdomen Pelvis: 4/12/2021, 9/21/2020, 2/6/2020, 10/3/18     10/13/2021 11:20am Fax request sent to [facility] for images. Fasx request sent to [facility] for med recs. Julio    10/13/2021 12:38pm Received recs from [facility]; sent to scan. A copy was placed in MD's folder in Right Fax. Julio

## 2021-10-07 ENCOUNTER — PRE VISIT (OUTPATIENT)
Dept: GASTROENTEROLOGY | Facility: CLINIC | Age: 51
End: 2021-10-07

## 2021-10-20 ENCOUNTER — OFFICE VISIT (OUTPATIENT)
Dept: GASTROENTEROLOGY | Facility: CLINIC | Age: 51
End: 2021-10-20
Payer: COMMERCIAL

## 2021-10-20 ENCOUNTER — LAB (OUTPATIENT)
Dept: LAB | Facility: CLINIC | Age: 51
End: 2021-10-20
Payer: COMMERCIAL

## 2021-10-20 VITALS
OXYGEN SATURATION: 97 % | BODY MASS INDEX: 18.42 KG/M2 | HEIGHT: 66 IN | HEART RATE: 58 BPM | SYSTOLIC BLOOD PRESSURE: 107 MMHG | WEIGHT: 114.6 LBS | DIASTOLIC BLOOD PRESSURE: 65 MMHG

## 2021-10-20 DIAGNOSIS — K50.818 CROHN'S DISEASE OF BOTH SMALL AND LARGE INTESTINE WITH OTHER COMPLICATION (H): Primary | ICD-10-CM

## 2021-10-20 DIAGNOSIS — K50.90 CROHN'S DISEASE (H): ICD-10-CM

## 2021-10-20 LAB
ALBUMIN SERPL-MCNC: 2.3 G/DL (ref 3.4–5)
ALP SERPL-CCNC: 73 U/L (ref 40–150)
ALT SERPL W P-5'-P-CCNC: 21 U/L (ref 0–50)
ANION GAP SERPL CALCULATED.3IONS-SCNC: 2 MMOL/L (ref 3–14)
AST SERPL W P-5'-P-CCNC: 12 U/L (ref 0–45)
BASOPHILS # BLD AUTO: 0.1 10E3/UL (ref 0–0.2)
BASOPHILS NFR BLD AUTO: 1 %
BILIRUB SERPL-MCNC: 0.3 MG/DL (ref 0.2–1.3)
BUN SERPL-MCNC: 16 MG/DL (ref 7–30)
CALCIUM SERPL-MCNC: 8.8 MG/DL (ref 8.5–10.1)
CHLORIDE BLD-SCNC: 109 MMOL/L (ref 94–109)
CO2 SERPL-SCNC: 28 MMOL/L (ref 20–32)
CREAT SERPL-MCNC: 0.84 MG/DL (ref 0.52–1.04)
CRP SERPL-MCNC: 4.8 MG/L (ref 0–8)
EOSINOPHIL # BLD AUTO: 0 10E3/UL (ref 0–0.7)
EOSINOPHIL NFR BLD AUTO: 0 %
ERYTHROCYTE [DISTWIDTH] IN BLOOD BY AUTOMATED COUNT: 13.3 % (ref 10–15)
ERYTHROCYTE [SEDIMENTATION RATE] IN BLOOD BY WESTERGREN METHOD: 41 MM/HR (ref 0–20)
GFR SERPL CREATININE-BSD FRML MDRD: 82 ML/MIN/1.73M2
GLUCOSE BLD-MCNC: 94 MG/DL (ref 70–99)
HCT VFR BLD AUTO: 36.8 % (ref 35–47)
HGB BLD-MCNC: 11.8 G/DL (ref 11.7–15.7)
IMM GRANULOCYTES # BLD: 0.1 10E3/UL
IMM GRANULOCYTES NFR BLD: 1 %
LYMPHOCYTES # BLD AUTO: 0.6 10E3/UL (ref 0.8–5.3)
LYMPHOCYTES NFR BLD AUTO: 7 %
MCH RBC QN AUTO: 29.6 PG (ref 26.5–33)
MCHC RBC AUTO-ENTMCNC: 32.1 G/DL (ref 31.5–36.5)
MCV RBC AUTO: 92 FL (ref 78–100)
MONOCYTES # BLD AUTO: 0.4 10E3/UL (ref 0–1.3)
MONOCYTES NFR BLD AUTO: 4 %
NEUTROPHILS # BLD AUTO: 8.6 10E3/UL (ref 1.6–8.3)
NEUTROPHILS NFR BLD AUTO: 87 %
NRBC # BLD AUTO: 0 10E3/UL
NRBC BLD AUTO-RTO: 0 /100
PLATELET # BLD AUTO: 387 10E3/UL (ref 150–450)
POTASSIUM BLD-SCNC: 4 MMOL/L (ref 3.4–5.3)
PROT SERPL-MCNC: 7.2 G/DL (ref 6.8–8.8)
RBC # BLD AUTO: 3.99 10E6/UL (ref 3.8–5.2)
SODIUM SERPL-SCNC: 139 MMOL/L (ref 133–144)
WBC # BLD AUTO: 9.8 10E3/UL (ref 4–11)

## 2021-10-20 PROCEDURE — 85025 COMPLETE CBC W/AUTO DIFF WBC: CPT | Performed by: PATHOLOGY

## 2021-10-20 PROCEDURE — 36415 COLL VENOUS BLD VENIPUNCTURE: CPT | Performed by: PATHOLOGY

## 2021-10-20 PROCEDURE — 86140 C-REACTIVE PROTEIN: CPT | Performed by: PATHOLOGY

## 2021-10-20 PROCEDURE — 99205 OFFICE O/P NEW HI 60 MIN: CPT | Performed by: INTERNAL MEDICINE

## 2021-10-20 PROCEDURE — 80053 COMPREHEN METABOLIC PANEL: CPT | Performed by: PATHOLOGY

## 2021-10-20 PROCEDURE — 85652 RBC SED RATE AUTOMATED: CPT | Performed by: PATHOLOGY

## 2021-10-20 ASSESSMENT — MIFFLIN-ST. JEOR: SCORE: 1161.57

## 2021-10-20 NOTE — PROGRESS NOTES
CD NEW    PATIENT: Piper Long    MRN: [id number]    Date of Birth 1970    Tel: [phone] (home)     PCP: Ibrahima Nicole     HPI: Ms. Long is a 51 year old female here to establish care for Crohn's disease. At the start of the appointment, Piper expressed her desire to keep work up and drug treatment to a minimum, alluding to a history of intolerance to various diagnostic tests (eg. contrast) and therapies.     Diagnosed with CD in 2003 ([facility]) via cscope and VCE (diarrhea, fatigue, abdominal cramps). Was told she has ileitis. Started prednisone and then transitioned 6MP x a few months. Thinks she had a reaction to it and it was not effective. Transitioned to Remicade in 2004 x 4 months, stopped working. Started on Humira in 2005/6, which helped but not as much as Remicade. Between 7808-3125, switched to Cimzia (ineffective), then Stelara (ineffective) and then again Humira (c/b facial swelling).        Had two SBRs -- 2011 and 2013. Had been on TPN in the past.     Was seen by Dr. Oseguera and started Entyvio 11/2018. After one dose, reports developing thoracic outlet syndrome.    Established care with Dr. Mendoza at  with plans to restart Entyvio.      Reports needing to be hospitalized for colonoscopy prep. Reports feeling unwell for 1 month after a colonoscopy.     Gets weekly IV fluids. Had a couple administrations of IV steroids recently, which helped.     PCP: Dr. Ibrahima Nicole     No perianal issues currently    Noteworthy diet history- avoids grains, some dairy, high fiber. Currently using Ensure plus.     HBI  General well-being 2 = poor  Abdominal pain 2 = moderate, and difficulty eating   Number of liquid stools per day 0-6  Abdominal mass 0 = none  Current Complications Arthralgia    Constitutional symptoms:  Fever NO (reports subjective low grade fevers)  Weight loss 125-->120 (2-3 months)    Sid Classification  AGE AT DIAGNOSIS: A2 between 17 and 40 y  CURRENT DISEASE LOCATION: L3:  ileocolonic  L4 upper GI: NO  DISEASE BEHAVIOR (since disease onset): B2: stricturing  Perianal disease: NO    Total number of IBD surgeries (except perianal): 2    Current IBD Medications:  none    Past IBD Medications:   Entocort recently: reports feeling worse   Remicade. Felt well x 4 months, then stopped working.  Humira  Cimzia   Stelara  Entyvio most recently.     Past Medical History:   Diagnosis Date     Crohn's colitis (H) 1999    [facility]. [facility] to the [facility]ion. Has tried Imuran, Remicade, Humira, Methotrexate     Menorrhagia 6/7/2013     Partial small bowel obstruction (H) 5/17/2013     PONV (postoperative nausea and vomiting)      SBO (small bowel obstruction) (H) 2/4/2013        Past Surgical History:   Procedure Laterality Date     ABDOMEN SURGERY  2013    x 3 stricture removal.      CHOLECYSTECTOMY, LAPOROSCOPIC       COLONOSCOPY  05/13/10     DILATION AND CURETTAGE, HYSTEROSCOPY, ABLATE ENDOMETRIUM NOVASURE, COMBINED  12/11/2012    Procedure: COMBINED DILATION AND CURETTAGE, HYSTEROSCOPY, ABLATE ENDOMETRIUM NOVASURE;  Hysteroscopy, Dilataion and Curettage with Endometrial Ablation,Novasure;  Surgeon: Dana Vance MD;  Location: [facility] OR     INSERT PORT VASCULAR ACCESS  3/6/2014    Procedure: INSERT PORT VASCULAR ACCESS;  Power Port Placement;  Surgeon: Oneal Stephens MD;  Location: [facility] OR     SURGICAL HISTORY OF -   1992    rhinoplasty      TUBAL LIGATION  1997       Social History     Tobacco Use     Smoking status: Never Smoker     Smokeless tobacco: Never Used   Substance Use Topics     Alcohol use: Yes     Comment: couple drinks couple times a week       Family History   Problem Relation Age of Onset     Hypertension Father      C.A.D. Father      Cardiovascular Father      Cerebrovascular Disease Father      Allergies Son      Breast Cancer Mother    Son has Crohn's disease    Allergies   Allergen Reactions     Aldactone  "[Spironolactone] Other (See Comments)     Chest pain, GI upset     Chloraprep One Step      Diazepam      Suicidal thoughts, and major depression  Pt tolerates lorazepam     Erythromycin Nausea     She doesn't like the way she feels when she is on it.     Flagyl [Metronidazole Hcl] Other (See Comments)     headache     Humira Swelling     Facial swelling and just didn't feel good     Morphine [Fumaric Acid] Other (See Comments)     Pt states \"doesn't work and makes her miserable\"     Oxycodone      Pt reports \"it doesn't work and I just don't like the way I feel when I take it\"     Percocet [Oxycodone-Acetaminophen] Nausea     Pt doesn't like it- causes nausea     Vancomycin      Droperidol Anxiety     Prochlorperazine Anxiety     Risperdal Anxiety     Risperidone Anxiety     Other reaction(s): Nausea Only        Outpatient Encounter Medications as of 10/20/2021   Medication Sig Dispense Refill     ampicillin (PRINCIPEN) 500 MG capsule Take 1 capsule (500 mg) by mouth two times daily 60 capsule 2     Ascorbic Acid (VITAMIN C ER PO) Take 1,000 mg by mouth 3 times daily        Black Cohosh 160 MG CAPS Take 2 capsules by mouth       calcium carbonate (OS-TAYLOR 600 MG [place]. [place]) 600 MG tablet Take 1 tablet by mouth 2 times daily (with meals)       cholecalciferol (VITAMIN D3) 14913 UNITS capsule Take 1 capsule by mouth daily       cyanocolbalamin (VITAMIN  B-12) 500 MCG tablet Take 500 mcg by mouth 2 times daily       doxycycline (VIBRA-TABS) 100 MG tablet Take 100 mg by mouth 2 times daily       drospirenone-ethinyl estradiol (ANDRE) 3-0.02 MG tablet 1 active tablet (3 mg drospirenone/0.02 mg EE) PO qDay for 24 days, THEN 1 inert tablet PO qDay for 4 days 28 tablet 2     fluconazole (DIFLUCAN) 100 MG tablet Take 1.5 tablets (150 mg) by mouth daily 3 tablet 7     fluconazole (DIFLUCAN) 100 MG tablet Take 1.5 tablets (150 mg) by mouth daily (Patient not taking: Reported on 4/23/2020) 5 tablet 3     fluconazole (DIFLUCAN) 100 " MG tablet As neeed 1.5 tablets for yeast infection (Patient not taking: Reported on 4/23/2020) 2 tablet 3     HYDROcodone-acetaminophen (NORCO) 5-325 MG per tablet Take 1 tablet by mouth every 6 hours as needed for moderate to severe pain 12 tablet 0     Multiple Vitamins-Minerals (ZINC PO) Take 60 mg by mouth daily       Nutritional Supplements (ENSURE PLUS) LIQD Take by mouth 3 times daily (with meals)       Omega-3 Fatty Acids (FISH OIL OMEGA-3 PO)        ondansetron (ZOFRAN) 8 MG tablet Take 3 tablets (24 mg) by mouth every 8 hours as needed for nausea Place on all labels pt needs an appointment for further refills please schedule. 36 tablet 5     polyethylene glycol (MIRALAX) powder Take 17 g by mouth daily STIR 1 CAPFUL (17GM) IN 8 OZ OF LIQUID AND DRINK (Patient taking differently: Take 17 g by mouth daily STIR 1 CAPFUL (17GM) IN 8 OZ OF LIQUID AND DRINK  May repeat up to QID per pt request) 527 g 0     POTASSIUM CHLORIDE ER PO Take 20 mEq by mouth 2 times daily       SUMATRIPTAN SUCCINATE PO Take 50 mg by mouth once Take 50 mg (1 tablet) at onset of headache, may repeat in 2 hours if needed       tazarotene (TAZORAC) 0.05 % external cream User every night (Patient not taking: Reported on 1/20/2021) 60 g 3     tretinoin (RETIN-A) 0.05 % external cream Apply topically At Bedtime 20 g 3     tretinoin (RETIN-A) 0.1 % external cream Apply topically At Bedtime Pea sized amount at bedtime 45 g 3     No facility-administered encounter medications on file as of 10/20/2021.      NSAID  YES ibuprofen (abd pain, low abd pain after defecation -- also takes norco for this).     Review of Systems  Complete 10 System ROS performed. All are negative except as documented below, in the HPI, or in patient questionnaire from today's visit.    1) Constitutional: No fevers, chills, night sweats or malaise, weight loss or gain  2) Skin: No rash  3) Pulmonary: No wheeze, SOB, cough, sputum or hemoptysis  4) Cardiovascular: No Chest  "pain or palpitations  5) Genitourinary: No blood in urine or dysuria  6) Endocrine: No increased sweating, hunger, thirst or thyroid problems  7) Hematologic: No bruising and easy bleeding  8) Musculoskeletal: no new pain in joints or limitation in ROM  9) Neurologic: No dizziness, paresthesias or weakness or falls  10) Psychiatric:  not depressed/anxious, no sleep problems    PHYSICAL EXAM  Vitals: /65   Pulse 58   Ht 1.676 m (5' 6\")   Wt 52 kg (114 lb 9.6 oz)   SpO2 97%   BMI 18.50 kg/m      General appearance  Healthy appearing adult, in no acute distress     Eyes  Sclera anicteric  Pupils round and reactive to light     Ears, nose, mouth and throat  No obvious external lesions of ears and nose  Hearing intact     Neck  Symmetric  No obvious external lesions     Respiratory  Normal respiration, no use of accessory muscles      MSK  Gait normal     Abdomen:  Scars present. Mild TTP in RLQ and left abd. No RT or IG.     Skin  No rashes or jaundice      Psychiatric  Oriented to person, place and time  Tearful      DATA:  Reviewed in detail past documentation, medications and prior workup available in electronic health records or through outside records.    PERTINENT STUDIES:  Most recent CBC:  WBC   Date Value Ref Range Status   10/02/2015 6.6 4.0 - 11.0 10e9/L Final     WBC Count   Date Value Ref Range Status   10/20/2021 9.8 4.0 - 11.0 10e3/uL Final   ]  Hemoglobin   Date Value Ref Range Status   10/20/2021 11.8 11.7 - 15.7 g/dL Final   10/02/2015 12.4 11.7 - 15.7 g/dL Final   ]   Platelet Count   Date Value Ref Range Status   10/20/2021 387 150 - 450 10e3/uL Final   10/02/2015 298 150 - 450 10e9/L Final       Most recent coag:  INR   Date Value Ref Range Status   03/05/2014 0.93 0.86 - 1.14 Final       Most recent hepatic panel:  AST   Date Value Ref Range Status   10/20/2021 12 0 - 45 U/L Final   12/09/2015 14 U/L Final     ALT   Date Value Ref Range Status   10/20/2021 21 0 - 50 U/L Final "   12/09/2015 20 U/L Final     Bilirubin Conjugated   Date Value Ref Range Status   07/25/2012 0.0 0.0 - 0.3 mg/dL Final      Bilirubin Total   Date Value Ref Range Status   10/20/2021 0.3 0.2 - 1.3 mg/dL Final   10/02/2015 0.4 0.2 - 1.3 mg/dL Final     Albumin   Date Value Ref Range Status   10/20/2021 2.3 (L) 3.4 - 5.0 g/dL Final   10/02/2015 3.5 3.4 - 5.0 g/dL Final     Alkaline Phosphatase   Date Value Ref Range Status   10/20/2021 73 40 - 150 U/L Final   10/02/2015 67 40 - 150 U/L Final       Most recent creatinine:  Creatinine   Date Value Ref Range Status   10/20/2021 0.84 0.52 - 1.04 mg/dL Final   12/09/2015 0.75 mg/dL Final     CRP elevated in June 2021  FC 1000 in July 2021     DRUG MONITORING  Biologic concentration: IFX level undet, ATI 0 (drug intolerant assay)    Endoscopy:   icscope 2018: ileitis and distal proctitis   EGD 2018 normal    Imaging:    CT 9/2021:    Thickening of the wall of the sigmoid colon which is likely secondary to a inflammatory colitis.   Mildly distended loops of bowel in abdomen and pelvis with an air-fluid level which could be large and small bowel.   Evidence of previous surgery on the bowel.   Status post cholecystectomy.       MRE 2015:          IMPRESSION:    Ms. Long is a 51 year old here with Crohn's ileocolitis, with a history of exposures to multiple biologics (IFX, ADA, VDZ, UST) which were all stopped for primary and secondary nonresponse. I do suspect that she developed ATIs to IFX, given the IFX level was undetectable on a drug intolerant assay. I do suspect that Piper has some degree of active inflammation, given her most recent CT scan findings in 9/2021 and elevated CRP and FC during June/July 2021. I do think that she would benefit from a nutritional optimization very much and would greatly appreciate the care of Dr. Olson.     Piper is tearful today, expressing a difficult time with past treatments and intolerance to work up. Today, I proposed the following  steps for Piper:       DIAGNOSIS:  # Crohn's ileocolitis s/p SBR x 2 (2011, 2013)  # Very low albumin  # History of multiple biologic exposures   # Self reported history of intolerance to multiple diagnostic and therapeutic modalities    PLAN:  ----Fecal beti  ----Blood work   ----Referral to Dr. Olson (nutritionist) for nutritional optimization   ----MRE   ----Would recommend revisiting a biologic  ----Avoid nsaids     Return in about 3 months (around 1/20/2022). Consider referral to Dr. Ivelisse Forrester for mental health during future visit.     Odilia Thomson MD   of Medicine  Division of Gastroenterology, Hepatology and Nutrition  [facility]    October 20, 2021    65 minutes spent on the date of the encounter performing chart review, history and exam, documentation and further activities as noted above.

## 2021-10-20 NOTE — LETTER
10/20/2021         RE: Piper Long  [address]  [place] [address]        Dear Colleague,    Thank you for referring your patient, Piper Long, to the [facility] [place]. Please see a copy of my visit note below.    CD NEW    PATIENT: Piper Long    MRN: [id number]    Date of Birth 1970    Tel: [phone] (home)     PCP: Ibrahima Nicole     HPI: Ms. Long is a 51 year old female here to establish care for Crohn's disease. At the start of the appointment, Piper expressed her desire to keep work up and drug treatment to a minimum, alluding to a history of intolerance to various diagnostic tests (eg. contrast) and therapies.     Diagnosed with CD in 2003 ([facility]) via cscope and VCE (diarrhea, fatigue, abdominal cramps). Was told she has ileitis. Started prednisone and then transitioned 6MP x a few months. Thinks she had a reaction to it and it was not effective. Transitioned to Remicade in 2004 x 4 months, stopped working. Started on Humira in 2005/6, which helped but not as much as Remicade. Between 6815-1045, switched to Cimzia (ineffective), then Stelara (ineffective) and then again Humira (c/b facial swelling).        Had two SBRs -- 2011 and 2013. Had been on TPN in the past.     Was seen by Dr. Oseguera and started Entyvio 11/2018. After one dose, reports developing thoracic outlet syndrome.    Established care with Dr. Mendoza at  with plans to restart Entyvio.      Reports needing to be hospitalized for colonoscopy prep. Reports feeling unwell for 1 month after a colonoscopy.     Gets weekly IV fluids. Had a couple administrations of IV steroids recently, which helped.     PCP: Dr. Ibrahima Nicole     No perianal issues currently    Noteworthy diet history- avoids grains, some dairy, high fiber. Currently using Ensure plus.     HBI  General well-being 2 = poor  Abdominal pain 2 = moderate, and difficulty eating   Number of liquid stools per day  0-6  Abdominal mass 0 = none  Current Complications Arthralgia    Constitutional symptoms:  Fever NO (reports subjective low grade fevers)  Weight loss 125-->120 (2-3 months)    [place] Classification  AGE AT DIAGNOSIS: A2 between 17 and 40 y  CURRENT DISEASE LOCATION: L3: ileocolonic  L4 upper GI: NO  DISEASE BEHAVIOR (since disease onset): B2: stricturing  Perianal disease: NO    Total number of IBD surgeries (except perianal): 2    Current IBD Medications:  none    Past IBD Medications:   Entocort recently: reports feeling worse   Remicade. Felt well x 4 months, then stopped working.  Humira  Cimzia   Stelara  Entyvio most recently.     Past Medical History:   Diagnosis Date     Crohn's colitis (H) 1999    [facility]. [facility]. [facility] to the [facility]. Has tried Imuran, Remicade, Humira, Methotrexate     Menorrhagia 6/7/2013     Partial small bowel obstruction (H) 5/17/2013     PONV (postoperative nausea and vomiting)      SBO (small bowel obstruction) (H) 2/4/2013        Past Surgical History:   Procedure Laterality Date     ABDOMEN SURGERY  2013    x 3 stricture removal.      CHOLECYSTECTOMY, LAPOROSCOPIC       COLONOSCOPY  05/13/10     DILATION AND CURETTAGE, HYSTEROSCOPY, ABLATE ENDOMETRIUM NOVASURE, COMBINED  12/11/2012    Procedure: COMBINED DILATION AND CURETTAGE, HYSTEROSCOPY, ABLATE ENDOMETRIUM NOVASURE;  Hysteroscopy, Dilataion and Curettage with Endometrial Ablation,Novasure;  Surgeon: Dana Vance MD;  Location: [facility] OR     INSERT PORT VASCULAR ACCESS  3/6/2014    Procedure: INSERT PORT VASCULAR ACCESS;  Power Port Placement;  Surgeon: Oneal Stephens MD;  Location: [facility] OR     SURGICAL HISTORY OF -   1992    rhinoplasty      TUBAL LIGATION  1997       Social History     Tobacco Use     Smoking status: Never Smoker     Smokeless tobacco: Never Used   Substance Use Topics     Alcohol use: Yes     Comment: couple drinks couple times a week       Family  "History   Problem Relation Age of Onset     Hypertension Father      C.A.D. Father      Cardiovascular Father      Cerebrovascular Disease Father      Allergies Son      Breast Cancer Mother    Son has Crohn's disease    Allergies   Allergen Reactions     Aldactone [Spironolactone] Other (See Comments)     Chest pain, GI upset     Chloraprep One Step      Diazepam      Suicidal thoughts, and major depression  Pt tolerates lorazepam     Erythromycin Nausea     She doesn't like the way she feels when she is on it.     Flagyl [Metronidazole Hcl] Other (See Comments)     headache     Humira Swelling     Facial swelling and just didn't feel good     Morphine [Fumaric Acid] Other (See Comments)     Pt states \"doesn't work and makes her miserable\"     Oxycodone      Pt reports \"it doesn't work and I just don't like the way I feel when I take it\"     Percocet [Oxycodone-Acetaminophen] Nausea     Pt doesn't like it- causes nausea     Vancomycin      Droperidol Anxiety     Prochlorperazine Anxiety     Risperdal Anxiety     Risperidone Anxiety     Other reaction(s): Nausea Only        Outpatient Encounter Medications as of 10/20/2021   Medication Sig Dispense Refill     ampicillin (PRINCIPEN) 500 MG capsule Take 1 capsule (500 mg) by mouth two times daily 60 capsule 2     Ascorbic Acid (VITAMIN C ER PO) Take 1,000 mg by mouth 3 times daily        Black Cohosh 160 MG CAPS Take 2 capsules by mouth       calcium carbonate (OS-TAYLOR 600 MG [place]. CA) 600 MG tablet Take 1 tablet by mouth 2 times daily (with meals)       cholecalciferol (VITAMIN D3) 80255 UNITS capsule Take 1 capsule by mouth daily       cyanocolbalamin (VITAMIN  B-12) 500 MCG tablet Take 500 mcg by mouth 2 times daily       doxycycline (VIBRA-TABS) 100 MG tablet Take 100 mg by mouth 2 times daily       drospirenone-ethinyl estradiol (ANDRE) 3-0.02 MG tablet 1 active tablet (3 mg drospirenone/0.02 mg EE) PO qDay for 24 days, THEN 1 inert tablet PO qDay for 4 days 28 " tablet 2     fluconazole (DIFLUCAN) 100 MG tablet Take 1.5 tablets (150 mg) by mouth daily 3 tablet 7     fluconazole (DIFLUCAN) 100 MG tablet Take 1.5 tablets (150 mg) by mouth daily (Patient not taking: Reported on 4/23/2020) 5 tablet 3     fluconazole (DIFLUCAN) 100 MG tablet As neeed 1.5 tablets for yeast infection (Patient not taking: Reported on 4/23/2020) 2 tablet 3     HYDROcodone-acetaminophen (NORCO) 5-325 MG per tablet Take 1 tablet by mouth every 6 hours as needed for moderate to severe pain 12 tablet 0     Multiple Vitamins-Minerals (ZINC PO) Take 60 mg by mouth daily       Nutritional Supplements (ENSURE PLUS) LIQD Take by mouth 3 times daily (with meals)       Omega-3 Fatty Acids (FISH OIL OMEGA-3 PO)        ondansetron (ZOFRAN) 8 MG tablet Take 3 tablets (24 mg) by mouth every 8 hours as needed for nausea Place on all labels pt needs an appointment for further refills please schedule. 36 tablet 5     polyethylene glycol (MIRALAX) powder Take 17 g by mouth daily STIR 1 CAPFUL (17GM) IN 8 OZ OF LIQUID AND DRINK (Patient taking differently: Take 17 g by mouth daily STIR 1 CAPFUL (17GM) IN 8 OZ OF LIQUID AND DRINK  May repeat up to QID per pt request) 527 g 0     POTASSIUM CHLORIDE ER PO Take 20 mEq by mouth 2 times daily       SUMATRIPTAN SUCCINATE PO Take 50 mg by mouth once Take 50 mg (1 tablet) at onset of headache, may repeat in 2 hours if needed       tazarotene (TAZORAC) 0.05 % external cream User every night (Patient not taking: Reported on 1/20/2021) 60 g 3     tretinoin (RETIN-A) 0.05 % external cream Apply topically At Bedtime 20 g 3     tretinoin (RETIN-A) 0.1 % external cream Apply topically At Bedtime Pea sized amount at bedtime 45 g 3     No facility-administered encounter medications on file as of 10/20/2021.      NSAID  YES ibuprofen (abd pain, low abd pain after defecation -- also takes norco for this).     Review of Systems  Complete 10 System ROS performed. All are negative except as  "documented below, in the HPI, or in patient questionnaire from today's visit.    1) Constitutional: No fevers, chills, night sweats or malaise, weight loss or gain  2) Skin: No rash  3) Pulmonary: No wheeze, SOB, cough, sputum or hemoptysis  4) Cardiovascular: No Chest pain or palpitations  5) Genitourinary: No blood in urine or dysuria  6) Endocrine: No increased sweating, hunger, thirst or thyroid problems  7) Hematologic: No bruising and easy bleeding  8) Musculoskeletal: no new pain in joints or limitation in ROM  9) Neurologic: No dizziness, paresthesias or weakness or falls  10) Psychiatric:  not depressed/anxious, no sleep problems    PHYSICAL EXAM  Vitals: /65   Pulse 58   Ht 1.676 m (5' 6\")   Wt 52 kg (114 lb 9.6 oz)   SpO2 97%   BMI 18.50 kg/m      General appearance  Healthy appearing adult, in no acute distress     Eyes  Sclera anicteric  Pupils round and reactive to light     Ears, nose, mouth and throat  No obvious external lesions of ears and nose  Hearing intact     Neck  Symmetric  No obvious external lesions     Respiratory  Normal respiration, no use of accessory muscles      MSK  Gait normal     Abdomen:  Scars present. Mild TTP in RLQ and left abd. No RT or IG.     Skin  No rashes or jaundice      Psychiatric  Oriented to person, place and time  Tearful      DATA:  Reviewed in detail past documentation, medications and prior workup available in electronic health records or through outside records.    PERTINENT STUDIES:  Most recent CBC:  WBC   Date Value Ref Range Status   10/02/2015 6.6 4.0 - 11.0 10e9/L Final     WBC Count   Date Value Ref Range Status   10/20/2021 9.8 4.0 - 11.0 10e3/uL Final   ]  Hemoglobin   Date Value Ref Range Status   10/20/2021 11.8 11.7 - 15.7 g/dL Final   10/02/2015 12.4 11.7 - 15.7 g/dL Final   ]   Platelet Count   Date Value Ref Range Status   10/20/2021 387 150 - 450 10e3/uL Final   10/02/2015 298 150 - 450 10e9/L Final       Most recent coag:  INR "   Date Value Ref Range Status   03/05/2014 0.93 0.86 - 1.14 Final       Most recent hepatic panel:  AST   Date Value Ref Range Status   10/20/2021 12 0 - 45 U/L Final   12/09/2015 14 U/L Final     ALT   Date Value Ref Range Status   10/20/2021 21 0 - 50 U/L Final   12/09/2015 20 U/L Final     Bilirubin Conjugated   Date Value Ref Range Status   07/25/2012 0.0 0.0 - 0.3 mg/dL Final      Bilirubin Total   Date Value Ref Range Status   10/20/2021 0.3 0.2 - 1.3 mg/dL Final   10/02/2015 0.4 0.2 - 1.3 mg/dL Final     Albumin   Date Value Ref Range Status   10/20/2021 2.3 (L) 3.4 - 5.0 g/dL Final   10/02/2015 3.5 3.4 - 5.0 g/dL Final     Alkaline Phosphatase   Date Value Ref Range Status   10/20/2021 73 40 - 150 U/L Final   10/02/2015 67 40 - 150 U/L Final       Most recent creatinine:  Creatinine   Date Value Ref Range Status   10/20/2021 0.84 0.52 - 1.04 mg/dL Final   12/09/2015 0.75 mg/dL Final     CRP elevated in June 2021  FC 1000 in July 2021     DRUG MONITORING  Biologic concentration: IFX level undet, ATI 0 (drug intolerant assay)    Endoscopy:   icscope 2018: ileitis and distal proctitis   EGD 2018 normal    Imaging:    CT 9/2021:    Thickening of the wall of the sigmoid colon which is likely secondary to a inflammatory colitis.   Mildly distended loops of bowel in abdomen and pelvis with an air-fluid level which could be large and small bowel.   Evidence of previous surgery on the bowel.   Status post cholecystectomy.       MRE 2015:          IMPRESSION:    Ms. Long is a 51 year old here with Crohn's ileocolitis, with a history of exposures to multiple biologics (IFX, ADA, VDZ, UST) which were all stopped for primary and secondary nonresponse. I do suspect that she developed ATIs to IFX, given the IFX level was undetectable on a drug intolerant assay. I do suspect that Piper has some degree of active inflammation, given her most recent CT scan findings in 9/2021 and elevated CRP and FC during June/July 2021. I  do think that she would benefit from a nutritional optimization very much and would greatly appreciate the care of Dr. Olson.     Piper is tearful today, expressing a difficult time with past treatments and intolerance to work up. Today, I proposed the following steps for Piper:       DIAGNOSIS:  # Crohn's ileocolitis s/p SBR x 2 (2011, 2013)  # Very low albumin  # History of multiple biologic exposures   # Self reported history of intolerance to multiple diagnostic and therapeutic modalities    PLAN:  ----Fecal beti  ----Blood work   ----Referral to Dr. Olson (nutritionist) for nutritional optimization   ----MRE   ----Would recommend revisiting a biologic  ----Avoid nsaids     Return in about 3 months (around 1/20/2022). Consider referral to Dr. Ivelisse Forrester for mental health during future visit.     Odilia Thomson MD   of Medicine  Division of Gastroenterology, Hepatology and Nutrition  [facility]    October 20, 2021    65 minutes spent on the date of the encounter performing chart review, history and exam, documentation and further activities as noted above.      Again, thank you for allowing me to participate in the care of your patient.      Sincerely,    Odilia Thomson MD

## 2021-10-20 NOTE — PATIENT INSTRUCTIONS
It was a pleasure taking care of you today.  I've included a brief summary of our discussion and care plan from today's visit below.  Please review this information with your primary care provider.  ______________________________________________________________________    My recommendations are summarized as follows:  Options we discussed:   ----Fecal beti  ----Blood work   ----Referral to Dr. Olson (nutritionist)  ----MRE   ----Would recommend revisiting a biologic  ----Avoid nsaids   -- please see scheduling information provided below       For nursing questions you can call Maryann March at [phone]    Return to GI Clinic in 3  months to review your progress.    ______________________________________________________________________    How do I schedule labs, imaging studies, or procedures that were ordered in clinic today?     Labs: To schedule lab appointment at the [facility], use my chart or call [phone]. If you have a [place] lab closer to home where you are regularly seen you can give them a call.     Procedures: If a colonoscopy, upper endoscopy, breath test, esophageal manometry, or pH impedence was ordered today, our endoscopy team will call you to schedule this. If you have not heard from our endoscopy team within a week, please call [phone] to schedule.     Imaging Studies: If you were scheduled for a CT scan, X-ray, MRI, ultrasound, HIDA scan or other imaging study, please call [phone] to have this scheduled.     Referral: If a referral to another specialty was ordered, expect a phone call or follow instructions above. If you have not heard from anyone regarding your referral in a week, please call our clinic to check the status.     Who do I call with any questions after my visit?  Please be in touch if there are any further questions that arise following today's visit.  There are multiple ways to contact your gastroenterology care team.        During business  hours, you may reach a Gastroenterology nurse at [phone]      To schedule or reschedule an appointment, please call [phone].       You can always send a secure message through [employer]Day.  [employer] messages are answered by your nurse or doctor typically within 24 hours.  Please allow extra time on weekends and holidays.        For urgent/emergent questions after business hours, you may reach the on-call GI Fellow by contacting the [facility] at [phone].     How will I get the results of any tests ordered?    You will receive all of your results.  If you have signed up for [employer], any tests ordered at your visit will be available to you after your physician reviews them.  Typically this takes 1-2 weeks.  If there are urgent results that require a change in your care plan, your physician or nurse will call you to discuss the next steps.      What is [employer]Day?  [employer]Day is a secure way for you to access all of your healthcare records from the [facility].  It is a web based computer program, so you can sign on to it from any location.  It also allows you to send secure messages to your care team.  I recommend signing up for [employer]Day access if you have not already done so and are comfortable with using a computer.      How to I schedule a follow-up visit?  If you did not schedule a follow-up visit today, please call [phone] to schedule a follow-up office visit.                    Billing Type: Third-Party Bill Bill For Surgical Tray: no Expected Date Of Service: 06/26/2019

## 2021-10-20 NOTE — NURSING NOTE
"Chief Complaint   Patient presents with     New Patient       Vitals:    10/20/21 1346   BP: 107/65   Pulse: 58   SpO2: 97%   Weight: 52 kg (114 lb 9.6 oz)   Height: 1.676 m (5' 6\")       Body mass index is 18.5 kg/m .    Pam Tomas CMA    " DISPLAY PLAN FREE TEXT

## 2021-11-03 ENCOUNTER — APPOINTMENT (OUTPATIENT)
Dept: LAB | Facility: CLINIC | Age: 51
End: 2021-11-03
Payer: COMMERCIAL

## 2021-11-03 ENCOUNTER — OFFICE VISIT (OUTPATIENT)
Dept: DERMATOLOGY | Facility: CLINIC | Age: 51
End: 2021-11-03
Payer: COMMERCIAL

## 2021-11-03 VITALS — SYSTOLIC BLOOD PRESSURE: 115 MMHG | OXYGEN SATURATION: 98 % | DIASTOLIC BLOOD PRESSURE: 59 MMHG | HEART RATE: 76 BPM

## 2021-11-03 DIAGNOSIS — L70.0 ACNE VULGARIS: ICD-10-CM

## 2021-11-03 DIAGNOSIS — L70.0 ACNE VULGARIS: Primary | ICD-10-CM

## 2021-11-03 PROCEDURE — 99213 OFFICE O/P EST LOW 20 MIN: CPT | Performed by: DERMATOLOGY

## 2021-11-03 PROCEDURE — 83993 ASSAY FOR CALPROTECTIN FECAL: CPT | Performed by: INTERNAL MEDICINE

## 2021-11-03 RX ORDER — TRETINOIN 1 MG/G
CREAM TOPICAL AT BEDTIME
Qty: 45 G | Refills: 11 | Status: SHIPPED | OUTPATIENT
Start: 2021-11-03 | End: 2022-09-19

## 2021-11-03 RX ORDER — DROSPIRENONE AND ETHINYL ESTRADIOL 0.02-3(28)
KIT ORAL
Qty: 28 TABLET | Refills: 2 | Status: CANCELLED | OUTPATIENT
Start: 2021-11-03

## 2021-11-03 RX ORDER — TRETINOIN 0.5 MG/G
CREAM TOPICAL AT BEDTIME
Qty: 45 G | Refills: 11 | Status: SHIPPED | OUTPATIENT
Start: 2021-11-03

## 2021-11-03 RX ORDER — DROSPIRENONE AND ETHINYL ESTRADIOL 0.02-3(28)
1 KIT ORAL DAILY
Qty: 30 TABLET | Refills: 6 | Status: SHIPPED | OUTPATIENT
Start: 2021-11-03 | End: 2022-04-19

## 2021-11-03 NOTE — TELEPHONE ENCOUNTER
Last Written Prescription Date:    Last Fill Quantity: ,  # refills:    Last office visit: 2021 with prescribing provider:     Future Office Visit:   Next 5 appointments (look out 90 days)    2021  1:00 PM  (Arrive by 12:45 PM)  Return Visit with Dale Nix MD  [facility] ([facility] ) [address] [place] [id number]  [phone]   2021  2:00 PM  Telephone Visit with Michael Olson RD  [facility] [place] ([facility] ) [address] [address] [phone]             Requested Prescriptions   Pending Prescriptions Disp Refills     drospirenone-ethinyl estradiol (ANDRE) 3-0.02 MG tablet 28 tablet 2     Si active tablet (3 mg drospirenone/0.02 mg EE) PO qDay for 24 days, THEN 1 inert tablet PO qDay for 4 days       There is no refill protocol information for this order

## 2021-11-03 NOTE — PROGRESS NOTES
Piper Long is an extremely pleasant 51 year old year old female patient here today for f/u acne.  Has hx of chron's/  LOV we added OCT based on womens clinic and she has done muchbetter.  Face clear.  Using tretinoin as well.  Not on ampicillin.  She notes oily skin.  Patient has no other skin complaints today.  Remainder of the HPI, Meds, PMH, Allergies, FH, and SH was reviewed in chart.      Past Medical History:   Diagnosis Date     Crohn's colitis (H) 1999    [facility] [place]. Has been to the [facility] for consultion. Has tried Imuran, Remicade, Humira, Methotrexate     Menorrhagia 6/7/2013     Partial small bowel obstruction (H) 5/17/2013     PONV (postoperative nausea and vomiting)      SBO (small bowel obstruction) (H) 2/4/2013       Past Surgical History:   Procedure Laterality Date     ABDOMEN SURGERY  2013    x 3 stricture removal.      CHOLECYSTECTOMY, LAPOROSCOPIC       COLONOSCOPY  05/13/10     DILATION AND CURETTAGE, HYSTEROSCOPY, ABLATE ENDOMETRIUM NOVASURE, COMBINED  12/11/2012    Procedure: COMBINED DILATION AND CURETTAGE, HYSTEROSCOPY, ABLATE ENDOMETRIUM NOVASURE;  Hysteroscopy, Dilataion and Curettage with Endometrial Ablation,Novasure;  Surgeon: Dana Vance MD;  Location: [facility] OR     INSERT PORT VASCULAR ACCESS  3/6/2014    Procedure: INSERT PORT VASCULAR ACCESS;  Power Port Placement;  Surgeon: Oneal Stephens MD;  Location: [facility]     SURGICAL HISTORY OF -   1992    rhinoplasty      TUBAL LIGATION  1997        Family History   Problem Relation Age of Onset     Hypertension Father      C.A.D. Father      Cardiovascular Father      Cerebrovascular Disease Father      Allergies Son      Breast Cancer Mother        Social History     Socioeconomic History     Marital status:      Spouse name: Not on file     Number of children: Not on file     Years of education: Not on file     Highest education level: Not on file   Occupational History     Not on file    Tobacco Use     Smoking status: Never Smoker     Smokeless tobacco: Never Used   Substance and Sexual Activity     Alcohol use: Yes     Comment: couple drinks couple times a week     Drug use: Yes     Comment: some medicianl marijuana     Sexual activity: Yes     Partners: Male     Birth control/protection: Surgical, Condom   Other Topics Concern     Parent/sibling w/ CABG, MI or angioplasty before 65F 55M? Yes     Comment: father had CAD      Service No     Blood Transfusions No     Caffeine Concern No     Occupational Exposure No     Hobby Hazards No     Sleep Concern No     Stress Concern Yes     Weight Concern No     Special Diet Yes     Back Care No     Exercise Yes     Comment: walking, stretching     Bike Helmet No     Seat Belt Yes     Self-Exams No   Social History Narrative     Not on file     Social Determinants of Health     Financial Resource Strain:      Difficulty of Paying Living Expenses:    Food Insecurity:      Worried About Running Out of Food in the Last Year:      Ran Out of Food in the Last Year:    Transportation Needs:      Lack of Transportation (Medical):      Lack of Transportation (Non-Medical):    Physical Activity:      Days of Exercise per Week:      Minutes of Exercise per Session:    Stress:      Feeling of Stress :    Social Connections:      Frequency of Communication with Friends and Family:      Frequency of Social Gatherings with Friends and Family:      Attends Hoahaoism Services:      Active Member of Clubs or Organizations:      Attends Club or Organization Meetings:      Marital Status:    Intimate Partner Violence:      Fear of Current or Ex-Partner:      Emotionally Abused:      Physically Abused:      Sexually Abused:        Outpatient Encounter Medications as of 11/3/2021   Medication Sig Dispense Refill     Ascorbic Acid (VITAMIN C ER PO) Take 1,000 mg by mouth 3 times daily        Black Cohosh 160 MG CAPS Take 2 capsules by mouth       calcium carbonate  (OS-TAYLOR 600 MG [place]. CA) 600 MG tablet Take 1 tablet by mouth 2 times daily (with meals)       cholecalciferol (VITAMIN D3) 80027 UNITS capsule Take 1 capsule by mouth daily       cyanocolbalamin (VITAMIN  B-12) 500 MCG tablet Take 500 mcg by mouth 2 times daily       drospirenone-ethinyl estradiol (ANDRE) 3-0.02 MG tablet Take 1 tablet by mouth daily 30 tablet 6     drospirenone-ethinyl estradiol (ANDRE) 3-0.02 MG tablet 1 active tablet (3 mg drospirenone/0.02 mg EE) PO qDay for 24 days, THEN 1 inert tablet PO qDay for 4 days 28 tablet 2     fluconazole (DIFLUCAN) 100 MG tablet Take 1.5 tablets (150 mg) by mouth daily 3 tablet 7     fluconazole (DIFLUCAN) 100 MG tablet Take 1.5 tablets (150 mg) by mouth daily 5 tablet 3     fluconazole (DIFLUCAN) 100 MG tablet As neeed 1.5 tablets for yeast infection 2 tablet 3     HYDROcodone-acetaminophen (NORCO) 5-325 MG per tablet Take 1 tablet by mouth every 6 hours as needed for moderate to severe pain 12 tablet 0     Multiple Vitamins-Minerals (ZINC PO) Take 60 mg by mouth daily       Nutritional Supplements (ENSURE PLUS) LIQD Take by mouth 3 times daily (with meals)       Omega-3 Fatty Acids (FISH OIL OMEGA-3 PO)        ondansetron (ZOFRAN) 8 MG tablet Take 3 tablets (24 mg) by mouth every 8 hours as needed for nausea Place on all labels pt needs an appointment for further refills please schedule. 36 tablet 5     polyethylene glycol (MIRALAX) powder Take 17 g by mouth daily STIR 1 CAPFUL (17GM) IN 8 OZ OF LIQUID AND DRINK (Patient taking differently: Take 17 g by mouth daily STIR 1 CAPFUL (17GM) IN 8 OZ OF LIQUID AND DRINK  May repeat up to QID per pt request) 527 g 0     POTASSIUM CHLORIDE ER PO Take 20 mEq by mouth 2 times daily       SUMATRIPTAN SUCCINATE PO Take 50 mg by mouth once Take 50 mg (1 tablet) at onset of headache, may repeat in 2 hours if needed       tazarotene (TAZORAC) 0.05 % external cream User every night 60 g 3     tretinoin (RETIN-A) 0.05 % external cream  Apply topically At Bedtime 45 g 11     tretinoin (RETIN-A) 0.05 % external cream Apply topically At Bedtime 20 g 3     tretinoin (RETIN-A) 0.1 % external cream Apply topically At Bedtime 45 g 11     tretinoin (RETIN-A) 0.1 % external cream Apply topically At Bedtime Pea sized amount at bedtime 45 g 3     ampicillin (PRINCIPEN) 500 MG capsule Take 1 capsule (500 mg) by mouth two times daily (Patient not taking: Reported on 11/3/2021) 60 capsule 2     doxycycline (VIBRA-TABS) 100 MG tablet Take 100 mg by mouth 2 times daily (Patient not taking: Reported on 11/3/2021)       No facility-administered encounter medications on file as of 11/3/2021.             O:   NAD, WDWN, Alert & Oriented, Mood & Affect wnl, Vitals stable   Here today alone   /59 (BP Location: Right arm, Patient Position: Sitting, Cuff Size: Adult Regular)   Pulse 76   SpO2 98%    General appearance normal   Vitals stable   Alert, oriented and in no acute distress     Face clear      Eyes: Conjunctivae/lids:Normal     ENT: Lips, buccal mucosa, tongue: normal    MSK:Normal    Cardiovascular: peripheral edema none    Pulm: Breathing Normal    Neuro/Psych: Orientation:Alert and Orientedx3 ; Mood/Affect:normal       A/P:  1. Acne clear !!  Doing well  Alternate tretinoin 0.05% and 0.1%   Cont Ericka   Cont skin care   Return to clinic 6 months  It was a pleasure speaking to Piper Long today.  Previous clinic notes and pertinent laboratory tests were reviewed prior to Piper Long's visit.  Skin care regimen reviewed with patient: Eliminate harsh soaps, i.e. Dial, zest, irsih spring; Mild soaps such as Cetaphil or Dove sensitive skin, avoid hot or cold showers, aggressive use of emollients including vanicream, cetaphil or cerave discussed with patient.

## 2021-11-03 NOTE — NURSING NOTE
Chief Complaint   Patient presents with     Acne     f/u        Vitals:    11/03/21 1252   BP: 115/59   BP Location: Right arm   Patient Position: Sitting   Cuff Size: Adult Regular   Pulse: 76   SpO2: 98%     Wt Readings from Last 1 Encounters:   10/20/21 52 kg (114 lb 9.6 oz)       Maryam Landis LPN .................11/3/2021

## 2021-11-03 NOTE — LETTER
11/3/2021         RE: Piper Long  [address]  [place] [address]        Dear Colleague,    Thank you for referring your patient, Piper Long, to the [facility]. Please see a copy of my visit note below.    Piper Long is an extremely pleasant 51 year old year old female patient here today for f/u acne.  Has hx of chron's/  LOV we added OCT based on womens clinic and she has done muchbetter.  Face clear.  Using tretinoin as well.  Not on ampicillin.  She notes oily skin.  Patient has no other skin complaints today.  Remainder of the HPI, Meds, PMH, Allergies, FH, and SH was reviewed in chart.      Past Medical History:   Diagnosis Date     Crohn's colitis (H) 1999    [facility]. Has been to the [facility] for consultion. Has tried Imuran, Remicade, Humira, Methotrexate     Menorrhagia 6/7/2013     Partial small bowel obstruction (H) 5/17/2013     PONV (postoperative nausea and vomiting)      SBO (small bowel obstruction) (H) 2/4/2013       Past Surgical History:   Procedure Laterality Date     ABDOMEN SURGERY  2013    x 3 stricture removal.      CHOLECYSTECTOMY, LAPOROSCOPIC       COLONOSCOPY  05/13/10     DILATION AND CURETTAGE, HYSTEROSCOPY, ABLATE ENDOMETRIUM NOVASURE, COMBINED  12/11/2012    Procedure: COMBINED DILATION AND CURETTAGE, HYSTEROSCOPY, ABLATE ENDOMETRIUM NOVASURE;  Hysteroscopy, Dilataion and Curettage with Endometrial Ablation,Novasure;  Surgeon: Dana Vance MD;  Location: [facility] OR     INSERT PORT VASCULAR ACCESS  3/6/2014    Procedure: INSERT PORT VASCULAR ACCESS;  Power Port Placement;  Surgeon: Oneal Stephens MD;  Location: [facility]     SURGICAL HISTORY OF -   1992    rhinoplasty      TUBAL LIGATION  1997        Family History   Problem Relation Age of Onset     Hypertension Father      C.A.D. Father      Cardiovascular Father      Cerebrovascular Disease Father      Allergies Son      Breast Cancer Mother        Social  History     Socioeconomic History     Marital status:      Spouse name: Not on file     Number of children: Not on file     Years of education: Not on file     Highest education level: Not on file   Occupational History     Not on file   Tobacco Use     Smoking status: Never Smoker     Smokeless tobacco: Never Used   Substance and Sexual Activity     Alcohol use: Yes     Comment: couple drinks couple times a week     Drug use: Yes     Comment: some medicianl marijuana     Sexual activity: Yes     Partners: Male     Birth control/protection: Surgical, Condom   Other Topics Concern     Parent/sibling w/ CABG, MI or angioplasty before 65F 55M? Yes     Comment: father had CAD      Service No     Blood Transfusions No     Caffeine Concern No     Occupational Exposure No     Hobby Hazards No     Sleep Concern No     Stress Concern Yes     Weight Concern No     Special Diet Yes     Back Care No     Exercise Yes     Comment: walking, stretching     Bike Helmet No     Seat Belt Yes     Self-Exams No   Social History Narrative     Not on file     Social Determinants of Health     Financial Resource Strain:      Difficulty of Paying Living Expenses:    Food Insecurity:      Worried About Running Out of Food in the Last Year:      Ran Out of Food in the Last Year:    Transportation Needs:      Lack of Transportation (Medical):      Lack of Transportation (Non-Medical):    Physical Activity:      Days of Exercise per Week:      Minutes of Exercise per Session:    Stress:      Feeling of Stress :    Social Connections:      Frequency of Communication with Friends and Family:      Frequency of Social Gatherings with Friends and Family:      Attends Sikh Services:      Active Member of Clubs or Organizations:      Attends Club or Organization Meetings:      Marital Status:    Intimate Partner Violence:      Fear of Current or Ex-Partner:      Emotionally Abused:      Physically Abused:      Sexually Abused:         Outpatient Encounter Medications as of 11/3/2021   Medication Sig Dispense Refill     Ascorbic Acid (VITAMIN C ER PO) Take 1,000 mg by mouth 3 times daily        Black Cohosh 160 MG CAPS Take 2 capsules by mouth       calcium carbonate (OS-TAYLOR 600 MG [place]. CA) 600 MG tablet Take 1 tablet by mouth 2 times daily (with meals)       cholecalciferol (VITAMIN D3) 02666 UNITS capsule Take 1 capsule by mouth daily       cyanocolbalamin (VITAMIN  B-12) 500 MCG tablet Take 500 mcg by mouth 2 times daily       drospirenone-ethinyl estradiol (ANDRE) 3-0.02 MG tablet Take 1 tablet by mouth daily 30 tablet 6     drospirenone-ethinyl estradiol (ANDRE) 3-0.02 MG tablet 1 active tablet (3 mg drospirenone/0.02 mg EE) PO qDay for 24 days, THEN 1 inert tablet PO qDay for 4 days 28 tablet 2     fluconazole (DIFLUCAN) 100 MG tablet Take 1.5 tablets (150 mg) by mouth daily 3 tablet 7     fluconazole (DIFLUCAN) 100 MG tablet Take 1.5 tablets (150 mg) by mouth daily 5 tablet 3     fluconazole (DIFLUCAN) 100 MG tablet As neeed 1.5 tablets for yeast infection 2 tablet 3     HYDROcodone-acetaminophen (NORCO) 5-325 MG per tablet Take 1 tablet by mouth every 6 hours as needed for moderate to severe pain 12 tablet 0     Multiple Vitamins-Minerals (ZINC PO) Take 60 mg by mouth daily       Nutritional Supplements (ENSURE PLUS) LIQD Take by mouth 3 times daily (with meals)       Omega-3 Fatty Acids (FISH OIL OMEGA-3 PO)        ondansetron (ZOFRAN) 8 MG tablet Take 3 tablets (24 mg) by mouth every 8 hours as needed for nausea Place on all labels pt needs an appointment for further refills please schedule. 36 tablet 5     polyethylene glycol (MIRALAX) powder Take 17 g by mouth daily STIR 1 CAPFUL (17GM) IN 8 OZ OF LIQUID AND DRINK (Patient taking differently: Take 17 g by mouth daily STIR 1 CAPFUL (17GM) IN 8 OZ OF LIQUID AND DRINK  May repeat up to QID per pt request) 527 g 0     POTASSIUM CHLORIDE ER PO Take 20 mEq by mouth 2 times daily        SUMATRIPTAN SUCCINATE PO Take 50 mg by mouth once Take 50 mg (1 tablet) at onset of headache, may repeat in 2 hours if needed       tazarotene (TAZORAC) 0.05 % external cream User every night 60 g 3     tretinoin (RETIN-A) 0.05 % external cream Apply topically At Bedtime 45 g 11     tretinoin (RETIN-A) 0.05 % external cream Apply topically At Bedtime 20 g 3     tretinoin (RETIN-A) 0.1 % external cream Apply topically At Bedtime 45 g 11     tretinoin (RETIN-A) 0.1 % external cream Apply topically At Bedtime Pea sized amount at bedtime 45 g 3     ampicillin (PRINCIPEN) 500 MG capsule Take 1 capsule (500 mg) by mouth two times daily (Patient not taking: Reported on 11/3/2021) 60 capsule 2     doxycycline (VIBRA-TABS) 100 MG tablet Take 100 mg by mouth 2 times daily (Patient not taking: Reported on 11/3/2021)       No facility-administered encounter medications on file as of 11/3/2021.             O:   NAD, WDWN, Alert & Oriented, Mood & Affect wnl, Vitals stable   Here today alone   /59 (BP Location: Right arm, Patient Position: Sitting, Cuff Size: Adult Regular)   Pulse 76   SpO2 98%    General appearance normal   Vitals stable   Alert, oriented and in no acute distress     Face clear      Eyes: Conjunctivae/lids:Normal     ENT: Lips, buccal mucosa, tongue: normal    MSK:Normal    Cardiovascular: peripheral edema none    Pulm: Breathing Normal    Neuro/Psych: Orientation:Alert and Orientedx3 ; Mood/Affect:normal       A/P:  1. Acne clear !!  Doing well  Alternate tretinoin 0.05% and 0.1%   Cont Ericka   Cont skin care   Return to clinic 6 months  It was a pleasure speaking to Piper Long today.  Previous clinic notes and pertinent laboratory tests were reviewed prior to Piper Long's visit.  Skin care regimen reviewed with patient: Eliminate harsh soaps, i.e. Dial, zest, irsih spring; Mild soaps such as Cetaphil or Dove sensitive skin, avoid hot or cold showers, aggressive use of emollients including  vanicream, cetaphil or cerave discussed with patient.          Again, thank you for allowing me to participate in the care of your patient.        Sincerely,        Dale Nix MD

## 2021-11-05 LAB — CALPROTECTIN STL-MCNT: 2100 MG/KG (ref 0–49.9)

## 2021-11-05 NOTE — PROGRESS NOTES
"[facility] Outpatient Medical Nutrition Therapy      Piper is a 51 year old who is being evaluated via a billable telephone visit.      What phone number would you like to be contacted at? [phone]  How would you like to obtain your AVS? Lora  Phone call duration: 40 minutes    Additional provider notes:  Referring Physician: Alix  Reason for RD Visit: IBD     Nutrition Plan: Continue use of Ensure plus to help support calorie and protein intake; Goal of 4 Ensure plus/day (1400 kcal and 64 g protein)    Recommendations for MD/Provider to order: None at this time    Malnutrition Diagnosis: Moderate malnutrition  In Context of:  Chronic illness or disease     Nutrition Assessment:  Patient is here for intial visit with Registered Dietitian (RD).  Patient is a 51 year old female recently seen by Dr. Thomson 10/20/2021 to establish care for Crohn's disease (ileocolonic; stricturing) diagnosed January 2003. Referred for nutrition optimization. Today recalls history limited to care at [facility], but she was displeased with the care she received there. She has historically focused on calorie and protein intake and has used Ensure for \"a long time\". She has experienced a 5% weight loss in the past 2 months. Intake of solid food limited. She has a goal of 4 Ensure plus/day, but feels that on average she is able to consume 3/day (1050 kcal and 48 g protein).    Past Surgical History: SBR x2    Symptom Review  1. Nausea/vomiting? Yes: Rarely vomits, but does experience vomiting when she develops and obstruction. Regularly experiences mild nausea  2. Heartburn? Yes: A little bit, but feels this is tied to food intake  3. Bloating? Yes: NA  4. Belching? NA  5. Feeling full quickly? NA  6. Decreased appetite? Yes: 30-40% with solid foods, but supplemented with Ensure  7. Weight loss/gain? Yes: Was between 120-125 pounds, but now having a hard time staying around 120  8. Constipation/Diarrhea? Yes: Constipation " improving    IBD-Q Score History  No flowsheet data found.    Dietary beliefs and Practices  1.  Did you modify your diet leading up to diagnosis OR Have you modified your diet since diagnosis?  No just mainly emphasized calorie and protein intake with the assistance of Ensure; tries to limit dairy and grains  2.  Do you believe that certain foods increase the risk of developing IBD? NA  3.  Do you believe that food/nutrition is an important part of your disease management? NA  4.  Are there specific foods you avoid? Yes: Symptom management and Prevent relapse: legumes, beans, oatmeal  5.  Do you believe that specific foods can cause relapse? NA  6.  Are there specific foods or food groups that you feel help? (symptoms/relapse)NA  7.  Do you avoid some foods or food groups for fear of worsening the disease flare?NA  8.  Have you received prior advice on diet?  Yes   A. If so, source? Dietitians  9.  Have you, or do you follow, a specific diet?  No   A. Which one(s)?  NA   B. Did/Do you find it beneficial?  NA    I. If able to articulate, what specifically is the benefit? NA  10.  Do you take any vitamin, mineral, or herbal supplements? Yes: Vitamin D, magnesium, calcium, folic acid, fish oil, B vitamins  11.  Do you use any calorie/protein supplements?  Yes: Ensure Plus - goal of 4/day (avg 3)  12.  Do you think IBD has influenced your pleasure in eating?  NA  13.  Do you feel stressed or anxious about eating? If so why? NA  14.  Do you avoid eating in social settings (e.g. Restaurants)? NA    Diet Recall:  (Typical Day)  Meal Name Time Food    Breakfast  Ensure Plus        Lunch  Apple + PB        Dinner  Chicken OR salmon AND applesauce AND sometimes asparagus        Snacks  Sweets (candy, pie); crackers   Beverages  Ensure throughout the day   Alcohol   None     Frequency of eating/taking out meals: NA  Food access/availability: Limited availability to cook where she is currently living  Food preparation  confidence/abilities: NA    Anthropometrics:   Height: Data Unavailable  Weight: 114 pounds  BMI: 18.50 kg/m     Weight History:  Wt Readings from Last 10 Encounters:   10/20/21 52 kg (114 lb 9.6 oz)   11/16/18 62.1 kg (137 lb)   10/04/18 60.3 kg (133 lb)   08/23/18 60.5 kg (133 lb 6.4 oz)   09/23/15 66.4 kg (146 lb 6.4 oz)   09/11/15 67.6 kg (149 lb)   06/04/15 68.5 kg (151 lb)   03/24/15 68.1 kg (150 lb 3.2 oz)   12/01/14 66.7 kg (147 lb)   08/11/14 64.9 kg (143 lb)     Usual Weight: 120-125 pounds  Weight change in past 6 months: 5% weight loss in past 2 months    Labs: Albumin 2.3 (10/20/2021); Fecal calprotectin 2,100 (11/3/2021)  Pertinent Medications/vitamin and mineral supplements:   Current Outpatient Medications   Medication     ampicillin (PRINCIPEN) 500 MG capsule     Ascorbic Acid (VITAMIN C ER PO)     Black Cohosh 160 MG CAPS     calcium carbonate (OS-TAYLOR 600 MG [place]. CA) 600 MG tablet     cholecalciferol (VITAMIN D3) 38080 UNITS capsule     cyanocolbalamin (VITAMIN  B-12) 500 MCG tablet     doxycycline (VIBRA-TABS) 100 MG tablet     drospirenone-ethinyl estradiol (ANDRE) 3-0.02 MG tablet     drospirenone-ethinyl estradiol (ANDRE) 3-0.02 MG tablet     fluconazole (DIFLUCAN) 100 MG tablet     fluconazole (DIFLUCAN) 100 MG tablet     fluconazole (DIFLUCAN) 100 MG tablet     HYDROcodone-acetaminophen (NORCO) 5-325 MG per tablet     Multiple Vitamins-Minerals (ZINC PO)     Nutritional Supplements (ENSURE PLUS) LIQD     Omega-3 Fatty Acids (FISH OIL OMEGA-3 PO)     ondansetron (ZOFRAN) 8 MG tablet     polyethylene glycol (MIRALAX) powder     POTASSIUM CHLORIDE ER PO     SUMATRIPTAN SUCCINATE PO     tazarotene (TAZORAC) 0.05 % external cream     tretinoin (RETIN-A) 0.05 % external cream     tretinoin (RETIN-A) 0.05 % external cream     tretinoin (RETIN-A) 0.1 % external cream     tretinoin (RETIN-A) 0.1 % external cream     No current facility-administered medications for this visit.       Food Allergies:  NA  Food Intolerances: NA  Physical Activity: NA  Estimated Nutrition Needs based on most recent body weight of 52 [id number] calories (25-35 kcals/kg), 50-60 g protein (1.0-1.2 g/kg), ~1 ml/kcal or total fluids per MD    MALNUTRITION:  % Weight Loss:  Up to 7.5% in 3 months (moderate malnutrition)  % Intake:  <75% for >/= 3 months (moderate malnutrition)  Subcutaneous Fat Loss:  NA  Muscle Loss:  Mild  Fluid Retention:  None noted    Nutrition Diagnosis:    Food and nutrition related knowledge deficit related to IBD as evidenced by need for diet education.    Nutrition Prescription: Regular diet    Nutrition Intervention:    Nutrition Education/Counseling:  Discussed diet and IBD history. Reviewed components of common diets currently proposed for IBD: Autoimmune protocol diet, IBD Anti-Inflammatory diet, Specific carbohydrate diet, Semi-vegetarian diet. Discussed that there is limited data on diet in IBD, but that these diets have been followed with some success in IBD. Highlighted that these all have a similar general theme geared towards more of a plant-based, less-processed diet composition. Discussed placing an emphasis on texture of fiber sources and striving for soft textures. Discussed metabolic demands of active disease and inflammation. Discussed use of supplements to help meet kcal/protein needs.     Educational Materials Provided: None at this time  Patient verbalized understanding of education provided. See all recommendations under Goals.    Goals:  1. Goal of 4 Ensure plus/day to help meet protein demands of inflammation and to support muscle mass    2. Possible enrollment in Peptamen trial    3. Continue to emphasize soft-texture fiber sources     Nutrition Monitoring and Evaluation: Will monitor adherence to nutrition recommendations at future RD visits.     Further Medical Nutrition Therapy: Yes  Next Appointment (if applicable): 6-8 weeks  Patient was encouraged to call/contact RD with any  further questions.

## 2021-11-08 ENCOUNTER — VIRTUAL VISIT (OUTPATIENT)
Dept: GASTROENTEROLOGY | Facility: CLINIC | Age: 51
End: 2021-11-08
Payer: COMMERCIAL

## 2021-11-08 DIAGNOSIS — K50.818 CROHN'S DISEASE OF BOTH SMALL AND LARGE INTESTINE WITH OTHER COMPLICATION (H): Primary | ICD-10-CM

## 2021-11-08 DIAGNOSIS — Z71.3 NUTRITIONAL COUNSELING: ICD-10-CM

## 2021-11-08 PROCEDURE — 99207 PR NONPHYSICIAN TELEPHONE ASSESSMENT 21-30 MIN: CPT | Performed by: DIETITIAN, REGISTERED

## 2021-11-08 NOTE — PATIENT INSTRUCTIONS
Kurtis Saldaña,    It was great meeting you today. Below is a summary of what we discussed:    1. Goal of 4 Ensure plus/day to help meet protein demands of inflammation and to support muscle mass    2. Possible enrollment in Peptamen trial    3. Continue to emphasize soft-texture fiber sources     Best regards,  Michael Olson, PhD, RD

## 2021-11-08 NOTE — LETTER
"    11/8/2021         RE: Piper Long  [address]  [place] [address]        Dear Colleague,    Thank you for referring your patient, Piper Long, to the [facility]STRO[facility] [place]. Please see a copy of my visit note below.    [facility] Outpatient Medical Nutrition Therapy    Referring Physician: Alix  Reason for RD Visit: IBD     Nutrition Plan: Continue use of Ensure plus to help support calorie and protein intake; Goal of 4 Ensure plus/day (1400 kcal and 64 g protein)    Recommendations for MD/Provider to order: None at this time    Malnutrition Diagnosis: Moderate malnutrition  In Context of:  Chronic illness or disease     Nutrition Assessment:  Patient is here for intial visit with Registered Dietitian (RD).  Patient is a 51 year old female recently seen by Dr. Thomson 10/20/2021 to establish care for Crohn's disease (ileocolonic; stricturing) diagnosed January 2003. Referred for nutrition optimization. Today recalls history limited to care at [facility], but she was displeased with the care she received there. She has historically focused on calorie and protein intake and has used Ensure for \"a long time\". She has experienced a 5% weight loss in the past 2 months. Intake of solid food limited. She has a goal of 4 Ensure plus/day, but feels that on average she is able to consume 3/day (1050 kcal and 48 g protein).    Past Surgical History: SBR x2    Symptom Review  1. Nausea/vomiting? Yes: Rarely vomits, but does experience vomiting when she develops and obstruction. Regularly experiences mild nausea  2. Heartburn? Yes: A little bit, but feels this is tied to food intake  3. Bloating? Yes: NA  4. Belching? NA  5. Feeling full quickly? NA  6. Decreased appetite? Yes: 30-40% with solid foods, but supplemented with Ensure  7. Weight loss/gain? Yes: Was between 120-125 pounds, but now having a hard time staying around 120  8. Constipation/Diarrhea? Yes: Constipation " improving    IBD-Q Score History  No flowsheet data found.    Dietary beliefs and Practices  1.  Did you modify your diet leading up to diagnosis OR Have you modified your diet since diagnosis?  No just mainly emphasized calorie and protein intake with the assistance of Ensure; tries to limit dairy and grains  2.  Do you believe that certain foods increase the risk of developing IBD? NA  3.  Do you believe that food/nutrition is an important part of your disease management? NA  4.  Are there specific foods you avoid? Yes: Symptom management and Prevent relapse: legumes, beans, oatmeal  5.  Do you believe that specific foods can cause relapse? NA  6.  Are there specific foods or food groups that you feel help? (symptoms/relapse)NA  7.  Do you avoid some foods or food groups for fear of worsening the disease flare?NA  8.  Have you received prior advice on diet?  Yes   A. If so, source? Dietitians  9.  Have you, or do you follow, a specific diet?  No   A. Which one(s)?  NA   B. Did/Do you find it beneficial?  NA    I. If able to articulate, what specifically is the benefit? NA  10.  Do you take any vitamin, mineral, or herbal supplements? Yes: Vitamin D, magnesium, calcium, folic acid, fish oil, B vitamins  11.  Do you use any calorie/protein supplements?  Yes: Ensure Plus - goal of 4/day (avg 3)  12.  Do you think IBD has influenced your pleasure in eating?  NA  13.  Do you feel stressed or anxious about eating? If so why? NA  14.  Do you avoid eating in social settings (e.g. Restaurants)? NA    Diet Recall:  (Typical Day)  Meal Name Time Food    Breakfast  Ensure Plus        Lunch  Apple + PB        Dinner  Chicken OR salmon AND applesauce AND sometimes asparagus        Snacks  Sweets (candy, pie); crackers   Beverages  Ensure throughout the day   Alcohol   None     Frequency of eating/taking out meals: NA  Food access/availability: Limited availability to cook where she is currently living  Food preparation  confidence/abilities: NA    Anthropometrics:   Height: Data Unavailable  Weight: 114 pounds  BMI: 18.50 kg/m     Weight History:  Wt Readings from Last 10 Encounters:   10/20/21 52 kg (114 lb 9.6 oz)   11/16/18 62.1 kg (137 lb)   10/04/18 60.3 kg (133 lb)   08/23/18 60.5 kg (133 lb 6.4 oz)   09/23/15 66.4 kg (146 lb 6.4 oz)   09/11/15 67.6 kg (149 lb)   06/04/15 68.5 kg (151 lb)   03/24/15 68.1 kg (150 lb 3.2 oz)   12/01/14 66.7 kg (147 lb)   08/11/14 64.9 kg (143 lb)     Usual Weight: 120-125 pounds  Weight change in past 6 months: 5% weight loss in past 2 months    Labs: Albumin 2.3 (10/20/2021); Fecal calprotectin 2,100 (11/3/2021)  Pertinent Medications/vitamin and mineral supplements:   Current Outpatient Medications   Medication     ampicillin (PRINCIPEN) 500 MG capsule     Ascorbic Acid (VITAMIN C ER PO)     Black Cohosh 160 MG CAPS     calcium carbonate (OS-TAYLOR 600 MG [place]. [place]) 600 MG tablet     cholecalciferol (VITAMIN D3) 56910 UNITS capsule     cyanocolbalamin (VITAMIN  B-12) 500 MCG tablet     doxycycline (VIBRA-TABS) 100 MG tablet     drospirenone-ethinyl estradiol (ANDRE) 3-0.02 MG tablet     drospirenone-ethinyl estradiol (ANDRE) 3-0.02 MG tablet     fluconazole (DIFLUCAN) 100 MG tablet     fluconazole (DIFLUCAN) 100 MG tablet     fluconazole (DIFLUCAN) 100 MG tablet     HYDROcodone-acetaminophen (NORCO) 5-325 MG per tablet     Multiple Vitamins-Minerals (ZINC PO)     Nutritional Supplements (ENSURE PLUS) LIQD     Omega-3 Fatty Acids (FISH OIL OMEGA-3 PO)     ondansetron (ZOFRAN) 8 MG tablet     polyethylene glycol (MIRALAX) powder     POTASSIUM CHLORIDE ER PO     SUMATRIPTAN SUCCINATE PO     tazarotene (TAZORAC) 0.05 % external cream     tretinoin (RETIN-A) 0.05 % external cream     tretinoin (RETIN-A) 0.05 % external cream     tretinoin (RETIN-A) 0.1 % external cream     tretinoin (RETIN-A) 0.1 % external cream     No current facility-administered medications for this visit.       Food Allergies:  NA  Food Intolerances: NA  Physical Activity: NA  Estimated Nutrition Needs based on most recent body weight of 52 [id number] calories (25-35 kcals/kg), 50-60 g protein (1.0-1.2 g/kg), ~1 ml/kcal or total fluids per MD    MALNUTRITION:  % Weight Loss:  Up to 7.5% in 3 months (moderate malnutrition)  % Intake:  <75% for >/= 3 months (moderate malnutrition)  Subcutaneous Fat Loss:  NA  Muscle Loss:  Mild  Fluid Retention:  None noted    Nutrition Diagnosis:    Food and nutrition related knowledge deficit related to IBD as evidenced by need for diet education.    Nutrition Prescription: Regular diet    Nutrition Intervention:    Nutrition Education/Counseling:  Discussed diet and IBD history. Reviewed components of common diets currently proposed for IBD: Autoimmune protocol diet, IBD Anti-Inflammatory diet, Specific carbohydrate diet, Semi-vegetarian diet. Discussed that there is limited data on diet in IBD, but that these diets have been followed with some success in IBD. Highlighted that these all have a similar general theme geared towards more of a plant-based, less-processed diet composition. Discussed placing an emphasis on texture of fiber sources and striving for soft textures. Discussed metabolic demands of active disease and inflammation. Discussed use of supplements to help meet kcal/protein needs.     Educational Materials Provided: None at this time  Patient verbalized understanding of education provided. See all recommendations under Goals.    Goals:  1. Goal of 4 Ensure plus/day to help meet protein demands of inflammation and to support muscle mass    2. Possible enrollment in Peptamen trial    3. Continue to emphasize soft-texture fiber sources     Nutrition Monitoring and Evaluation: Will monitor adherence to nutrition recommendations at future RD visits.     Further Medical Nutrition Therapy: Yes  Next Appointment (if applicable): 6-8 weeks  Patient was encouraged to call/contact RD with any  further questions.          Again, thank you for allowing me to participate in the care of your patient.      Sincerely,    Michael Olson RD

## 2021-11-10 ENCOUNTER — TELEPHONE (OUTPATIENT)
Dept: GASTROENTEROLOGY | Facility: CLINIC | Age: 51
End: 2021-11-10
Payer: COMMERCIAL

## 2021-11-10 NOTE — TELEPHONE ENCOUNTER
lvm to scheduel 6 week follow up with DENNIS SYED, left call center phone number [facility]

## 2021-12-07 ENCOUNTER — TELEPHONE (OUTPATIENT)
Dept: BEHAVIORAL HEALTH | Facility: CLINIC | Age: 51
End: 2021-12-07
Payer: COMMERCIAL

## 2021-12-08 ENCOUNTER — HOSPITAL ENCOUNTER (OUTPATIENT)
Dept: BEHAVIORAL HEALTH | Facility: CLINIC | Age: 51
End: 2021-12-08
Attending: FAMILY MEDICINE
Payer: COMMERCIAL

## 2021-12-08 PROCEDURE — 999N000216 HC STATISTIC ADULT CD FACE TO FACE-NO CHRG: Mod: TEL,95 | Performed by: SOCIAL WORKER

## 2021-12-08 NOTE — PROGRESS NOTES
I explained my role and what happens during the MH evaluation today.  Patient stated she is looking specifically for in-person intensive outpatient programming. I described our programs to patient and she said she is not interested in a virtual programming and expressed frustration that she can't find in-person groups. Patient declined continuing with the assessment today since we do not have what she needs. I explained I can also help make referrals outside our system. I offered to make phone calls and attempt to find a program and that she does not have to complete the assessment for me to assist her.  Pt continued to express her frustrations.  This clinician attempted to validate patient's feelings around not finding what she feels is best for her.  But stated that I would be willing to call around.    Patient said she is looking for an IOP that would be 1x a week and at most 2x a week. She feels the time commitment is like a full time job with drive time. I informed pt I am not aware of IOPs that only meeting 1x a week..   This clinician and another  called a few clinics and unable to find a clinic with in-person groups.  Did find one clinic but they are not accepting new patients at this this time.  Msg left for the clinic closest to pt-  -[facility] may have openings but it is likely all virtual as well.    I provided patient with an update. She expressed appreciation for trying to help her and was not interested in further follow-up or referrals.     Pt encouraged to f/u with her current therapist.   Patient has a therapist in place and will meet her therapist on Friday    JAEL Yañez, [facility]  [facility]  Phone: [phone]

## 2022-01-21 DIAGNOSIS — B35.9 TINEA: ICD-10-CM

## 2022-01-21 DIAGNOSIS — L70.0 ACNE VULGARIS: ICD-10-CM

## 2022-01-21 RX ORDER — AMPICILLIN TRIHYDRATE 500 MG
500 CAPSULE ORAL
Qty: 60 CAPSULE | Refills: 1 | Status: SHIPPED | OUTPATIENT
Start: 2022-01-21 | End: 2022-09-19

## 2022-01-21 RX ORDER — FLUCONAZOLE 100 MG/1
TABLET ORAL
Qty: 3 TABLET | Refills: 7 | Status: SHIPPED | OUTPATIENT
Start: 2022-01-21

## 2022-01-21 NOTE — TELEPHONE ENCOUNTER
LOV 11/2021: Acne.   Patient using tretinoin and BCP  Requesting refill on ampicillin 2/2 flare and diflucan which  appeard to have been ordered in past as well.      Please advise. Rx's cued    Thank you,   Bucky VACA.    [facility] - Flex RN

## 2022-02-07 ENCOUNTER — TELEPHONE (OUTPATIENT)
Dept: DERMATOLOGY | Facility: CLINIC | Age: 52
End: 2022-02-07
Payer: COMMERCIAL

## 2022-02-07 NOTE — TELEPHONE ENCOUNTER
Reason for Call:  Other call back and prescription    Detailed comments: Pt calling to see if she can get a different medication for her acne, she states she is on an antibiotic and a topical cream but she has a lot of infected acne and would like to see if there is something else she can try.    Phone Number Patient can be reached at: Cell number on file:    Telephone Information:   Mobile [phone]       Best Time:       Can we leave a detailed message on this number? YES    Call taken on 2/7/2022 at 12:28 PM by Savanna Lord MA

## 2022-02-07 NOTE — TELEPHONE ENCOUNTER
"Patient restarted on Ampicillin 500 mg twice daily on 1-21-22 per chart review due to break out.    Is on BCP and is using the alternating Tretinoin, but I use the lower strength one more often as the higher strength dries my skin out too much..\"    Last seen 11-3-2021 and skin was clear per dictation.    I spoke to patient who stated: \"It is pretty constant, I have chron's and it seems I am always having issues and I am obstructed a lot from that.\"    \"if I don't spend an hour a day, squeezing pus and oil out of my skin, I get boils and I use the paper masks at work and they seems to irritate my skin worse... My T zone area and around my mouth is really bad again and everyday, I am getting clear liquid out of it..\"    Please advise. Could I take a Vitamin A orally instead of topically? Would that be something I could try instead of the topical? Or would I need to have labs monitored instead?..     Uses [facility].    Unable to send photos via My chart. \"I am not good at that and my phone takes really bad photos..\"    Please advise. Rowan Lutz RN               "

## 2022-02-10 NOTE — TELEPHONE ENCOUNTER
"Tazorac was ordered for patient 4- and PA was denied.    Spoke to patient who stated:    \"Maybe, but I don't think it is covered by my insurance and I don't think the topical medication will fix the problem.\"    \"I am in menopause and I have chron's disease. I think it is something inside my body and my hormones... This might be like putting a band aid on an infection..\"    \"I am squeezing my face for an hour every morning and every night and if I don't squeeze them out, they become boils... I have these things oozing on my face and I think I have an over production of sebum.\"    \"I use ashwaganda to wash my face with cold water. But my skin moisture/tone is good, but the sebum production just won't stop..\"        Please advise.     Per chart review has been on:     Doxycyline  Ampicillin  Spironolactone (failed per Jan 21 notes)  BPO wash  Tretinoin cream  Tazorac (ordered but NOT covered by insurance)    Currently on:     Ericka Camejo (gets yeast infection from abxs)  Tretinoin 0.1%    Rowan Lutz RN        "

## 2022-02-10 NOTE — TELEPHONE ENCOUNTER
I am not sure what else, I have to offer, would she mind going to the [facility] for a second opinion

## 2022-02-16 NOTE — TELEPHONE ENCOUNTER
Message left stating Dr. Nix isn't sure what else he can offer her and is asking if she would like a referral to [facility] for a second opinion?  can return call or can send a My chart message to us if that is easier for patient. Rowan Lutz RN

## 2022-02-22 NOTE — TELEPHONE ENCOUNTER
"Spoke to patient who \"is trying to take some vitamins to see if that helps\" and she states she is \"not interested in a second opinion at the [facility] at this time.\"    I did advise to let us know if she changes her mind. Patient verbalized understanding. Rowan Lutz RN        "

## 2022-02-25 ENCOUNTER — LAB (OUTPATIENT)
Dept: LAB | Facility: CLINIC | Age: 52
End: 2022-02-25
Payer: COMMERCIAL

## 2022-02-25 DIAGNOSIS — K50.818 CROHN'S DISEASE OF BOTH SMALL AND LARGE INTESTINE WITH OTHER COMPLICATION (H): Primary | ICD-10-CM

## 2022-02-25 DIAGNOSIS — K50.818 CROHN'S DISEASE OF BOTH SMALL AND LARGE INTESTINE WITH OTHER COMPLICATION (H): ICD-10-CM

## 2022-02-25 LAB
BASOPHILS # BLD AUTO: 0 10E3/UL (ref 0–0.2)
BASOPHILS NFR BLD AUTO: 1 %
EOSINOPHIL # BLD AUTO: 0 10E3/UL (ref 0–0.7)
EOSINOPHIL NFR BLD AUTO: 1 %
ERYTHROCYTE [DISTWIDTH] IN BLOOD BY AUTOMATED COUNT: 13.8 % (ref 10–15)
HCT VFR BLD AUTO: 37.5 % (ref 35–47)
HGB BLD-MCNC: 11.9 G/DL (ref 11.7–15.7)
IMM GRANULOCYTES # BLD: 0 10E3/UL
IMM GRANULOCYTES NFR BLD: 0 %
LYMPHOCYTES # BLD AUTO: 0.6 10E3/UL (ref 0.8–5.3)
LYMPHOCYTES NFR BLD AUTO: 10 %
MCH RBC QN AUTO: 28.5 PG (ref 26.5–33)
MCHC RBC AUTO-ENTMCNC: 31.7 G/DL (ref 31.5–36.5)
MCV RBC AUTO: 90 FL (ref 78–100)
MONOCYTES # BLD AUTO: 0.5 10E3/UL (ref 0–1.3)
MONOCYTES NFR BLD AUTO: 8 %
NEUTROPHILS # BLD AUTO: 5 10E3/UL (ref 1.6–8.3)
NEUTROPHILS NFR BLD AUTO: 80 %
NRBC # BLD AUTO: 0 10E3/UL
NRBC BLD AUTO-RTO: 0 /100
PLATELET # BLD AUTO: 330 10E3/UL (ref 150–450)
RBC # BLD AUTO: 4.17 10E6/UL (ref 3.8–5.2)
WBC # BLD AUTO: 6.2 10E3/UL (ref 4–11)

## 2022-02-25 PROCEDURE — 85025 COMPLETE CBC W/AUTO DIFF WBC: CPT | Performed by: PATHOLOGY

## 2022-02-25 PROCEDURE — 36415 COLL VENOUS BLD VENIPUNCTURE: CPT | Performed by: PATHOLOGY

## 2022-04-06 ENCOUNTER — LAB REQUISITION (OUTPATIENT)
Dept: LAB | Facility: CLINIC | Age: 52
End: 2022-04-06

## 2022-04-06 LAB — HCT VFR BLD AUTO: 37.1 % (ref 35–47)

## 2022-04-06 PROCEDURE — 85014 HEMATOCRIT: CPT | Performed by: INTERNAL MEDICINE

## 2022-04-14 ENCOUNTER — TELEPHONE (OUTPATIENT)
Dept: GASTROENTEROLOGY | Facility: CLINIC | Age: 52
End: 2022-04-14
Payer: COMMERCIAL

## 2022-04-14 NOTE — TELEPHONE ENCOUNTER
Received the following email communication from patient:  Kurtis Rodriguez,  I found a [facility] pharmacy in [place], [place] that can get the Peptamen 1.5.  Could you or Dr. Malloy fax a prescription?      [facility] fax number:  [phone]      Also, could you or Dr. Malloy call my Medicaid provider?  I called [employer] and they gave me a Hic-Pic code to use for insurance.  Getting Ensure was difficult so I can only assume the Peptamen will be even more difficult.   So I will need extra help and Medicaid will need a prior authorization and documentation why this is necessary for me.  Most importantly I feel the Peptamen is better because it really helps me empty my bowel and have more complete bowel movements.    Hic-Pic code:      Medicaid provider phone number:    [phone] or [phone]    Medicaid provider web site:  [email or web address]    Thanks for your help!

## 2022-04-18 DIAGNOSIS — L70.0 ACNE VULGARIS: ICD-10-CM

## 2022-04-18 NOTE — LETTER
[facility] DERMATOLOGY CLINIC [place]  [address] [address]  Phone: [phone]    April 19, 2022    Piper Long                                                                                                                 [address] [place], [place] [address]  [place] [address]            Dear Ms. Long,    We recently provided you with a medication refill. Prescription medications require routine follow-up appointments with your Dermatology Provider.      At this time we ask that: You schedule a routine office visit with your Dermatology Provider to follow your Acne. Per 11/03/2021 Dermatology dictation, you were to return in 6 months for a recheck Acne appointment.     Your prescription: Has been refilled for 3 months so you may have time for the above noted follow-up. Please schedule a follow up Dermatology appointment as soon as possible to avoid going without medication. We are currently booking Dermatology appointments into July with a very long waiting list (> 550 patients). We do tend to book Dermatology appointments 8 to 12 weeks in advance year round.     Thank you,      Dale Nix MD/ [facility]

## 2022-04-19 ENCOUNTER — TELEPHONE (OUTPATIENT)
Dept: GASTROENTEROLOGY | Facility: CLINIC | Age: 52
End: 2022-04-19
Payer: COMMERCIAL

## 2022-04-19 RX ORDER — DROSPIRENONE AND ETHINYL ESTRADIOL 0.02-3(28)
1 KIT ORAL DAILY
Qty: 84 TABLET | Refills: 0 | Status: SHIPPED | OUTPATIENT
Start: 2022-04-19

## 2022-04-19 NOTE — TELEPHONE ENCOUNTER
LF: 3-24-22    Was to return in 6 mo for Acne recheck per Nov 2021 dictation. Needs appointment. Letter sent and note sent to pharmacy as well. Rowan Lutz RN

## 2022-04-25 DIAGNOSIS — K50.90 CROHN'S DISEASE (H): Primary | ICD-10-CM

## 2022-04-25 RX ORDER — NUTRITIONAL SUPPLEMENT
3 LIQUID (ML) ORAL 3 TIMES DAILY
Qty: 22500 ML | Refills: 11 | Status: SHIPPED | OUTPATIENT
Start: 2022-04-25 | End: 2023-09-26

## 2022-04-25 RX ORDER — NUTRITIONAL SUPPLEMENT
LIQUID (ML) ORAL
Status: CANCELLED | OUTPATIENT
Start: 2022-04-25

## 2022-04-25 NOTE — PROGRESS NOTES
Oral supplements of Peptamen 1.5  sent for the patient per request of Michael Olson and Dr Sanju Malloy.

## 2022-04-28 ENCOUNTER — TELEPHONE (OUTPATIENT)
Dept: GASTROENTEROLOGY | Facility: CLINIC | Age: 52
End: 2022-04-28
Payer: COMMERCIAL

## 2022-05-25 ENCOUNTER — ANCILLARY PROCEDURE (OUTPATIENT)
Dept: MRI IMAGING | Facility: CLINIC | Age: 52
End: 2022-05-25
Attending: INTERNAL MEDICINE
Payer: COMMERCIAL

## 2022-05-25 DIAGNOSIS — K50.818 CROHN'S DISEASE OF BOTH SMALL AND LARGE INTESTINE WITH OTHER COMPLICATION (H): ICD-10-CM

## 2022-05-25 PROCEDURE — 74183 MRI ABD W/O CNTR FLWD CNTR: CPT | Performed by: RADIOLOGY

## 2022-05-25 PROCEDURE — A9585 GADOBUTROL INJECTION: HCPCS | Performed by: RADIOLOGY

## 2022-05-25 PROCEDURE — 72197 MRI PELVIS W/O & W/DYE: CPT | Performed by: RADIOLOGY

## 2022-05-25 RX ORDER — GADOBUTROL 604.72 MG/ML
7.5 INJECTION INTRAVENOUS ONCE
Status: COMPLETED | OUTPATIENT
Start: 2022-05-25 | End: 2022-05-25

## 2022-05-25 RX ADMIN — GADOBUTROL 7 ML: 604.72 INJECTION INTRAVENOUS at 14:59

## 2022-05-25 NOTE — DISCHARGE INSTRUCTIONS
MRI Contrast Discharge Instructions    The IV contrast you received today will pass out of your body in your  urine. This will happen in the next 24 hours. You will not feel this process.  Your urine will not change color.    Drink at least 4 extra glasses of water or juice today (unless your doctor  has restricted your fluids). This reduces the stress on your kidneys.  You may take your regular medicines.    If you are on dialysis: It is best to have dialysis today.    If you have a reaction: Most reactions happen right away. If you have  any new symptoms after leaving the hospital (such as hives or swelling),  call your hospital at the correct number below. Or call your family doctor.  If you have breathing distress or wheezing, call 911.    Special instructions: ***    I have read and understand the above information.    Signature:______________________________________ Date:___________    Staff:__________________________________________ Date:___________     Time:__________    [place] [facility] Dep[facility]:    ___[facility]: [phone]  ___[facility]: [phone]  ___[facility]: [phone] ___[facility]: [phone]  ___[facility]: [phone]  ___[facility]: [phone]  ___[phone]  ___[facility]: [phone]  ___[phone]

## 2022-06-08 ENCOUNTER — TELEPHONE (OUTPATIENT)
Dept: GASTROENTEROLOGY | Facility: CLINIC | Age: 52
End: 2022-06-08
Payer: COMMERCIAL

## 2022-09-12 ENCOUNTER — TELEPHONE (OUTPATIENT)
Dept: DERMATOLOGY | Facility: CLINIC | Age: 52
End: 2022-09-12

## 2022-09-12 NOTE — TELEPHONE ENCOUNTER
Needs to be seen. Was scheduled to be seen for follow up on 8-15-22 and no showed that appointment.     See below as well copied/pasted from 4-18-22 refill encounter.  ---------------------------------------------------------------------  MM    4/19/22 3:47 PM  Note  LF: 3-24-22     Was to return in 6 mo for Acne recheck per Nov 2021 dictation. Needs appointment. Letter sent and note sent to pharmacy as well. Rowan Lutz RN           Spoke to patient and advised of need to be seen. Now scheduled for a telephone f/u visit. Rowan Lutz RN

## 2022-09-12 NOTE — TELEPHONE ENCOUNTER
[facility] Call Center    Phone Message    May a detailed message be left on voicemail: yes     Reason for Call: Medication Refill Request    Has the patient contacted the pharmacy for the refill? Yes   Name of medication being requested: ampicillin (PRINCIPEN) 500 MG capsule  Provider who prescribed the medication: Dr. Nix  Pharmacy: [id number] - [place], [place] - [address]  Date medication is needed: ASAP   Thanks       Action Taken: Message routed to:  [facility] Derm    Travel Screening: Not Applicable

## 2022-09-19 ENCOUNTER — VIRTUAL VISIT (OUTPATIENT)
Dept: DERMATOLOGY | Facility: CLINIC | Age: 52
End: 2022-09-19
Payer: COMMERCIAL

## 2022-09-19 DIAGNOSIS — L70.0 ACNE VULGARIS: Primary | ICD-10-CM

## 2022-09-19 PROCEDURE — 99442 PR PHYSICIAN TELEPHONE EVALUATION 11-20 MIN: CPT | Performed by: DERMATOLOGY

## 2022-09-19 RX ORDER — TRETINOIN 1 MG/G
CREAM TOPICAL AT BEDTIME
Qty: 45 G | Refills: 11 | Status: SHIPPED | OUTPATIENT
Start: 2022-09-19

## 2022-09-19 RX ORDER — AMPICILLIN TRIHYDRATE 500 MG
500 CAPSULE ORAL
Qty: 60 CAPSULE | Refills: 1 | Status: SHIPPED | OUTPATIENT
Start: 2022-09-19 | End: 2022-12-15

## 2022-09-19 NOTE — LETTER
"    9/19/2022         RE: Piper Long  [address]  [place] [address]        Dear Colleague,    Thank you for referring your patient, Piper Long, to the [facility]. Please see a copy of my visit note below.        Piper Long is a 52 year old female who is being evaluated via a phone  visit.      The patient has been notified of following:     \"This phone  visit will be conducted via a call between you and your physician/provider. We have found that certain health care needs can be provided without the need for an in-person physical exam.  This service lets us provide the care you need with a video conversation.  If a prescription is necessary we can send it directly to your pharmacy.  If lab work is needed we can place an order for that and you can then stop by our lab to have the test done at a later time.    Phone visits are billed at different rates depending on your insurance coverage.  Please reach out to your insurance provider with any questions.    If during the course of the call the physician/provider feels a phone visit is not appropriate, you will not be charged for this service.\"    Patient has given verbal consent for phone visit? Yes    How would you like to obtain your AVS? MyChart    Piper Long is a 52 year old year old female patient here today for f/u acne.  She notes things are better.  She did stop nam due to increase in estrogen at [facility].  She is on aldactone topical, ampicillin, and tretinoin, things are ok.  Patient has no other skin complaints today.  Remainder of the HPI, Meds, PMH, Allergies, FH, and SH was reviewed in chart.      Past Medical History:   Diagnosis Date     Crohn's colitis (H) 1999    [facility]  area. Has been to the [facility] for consultion. Has tried Imuran, Remicade, Humira, Methotrexate     Menorrhagia 6/7/2013     Partial small bowel obstruction (H) 5/17/2013     PONV (postoperative nausea and " vomiting)      SBO (small bowel obstruction) (H) 2/4/2013       Past Surgical History:   Procedure Laterality Date     ABDOMEN SURGERY  2013    x 3 stricture removal.      CHOLECYSTECTOMY, LAPOROSCOPIC       COLONOSCOPY  05/13/10     DILATION AND CURETTAGE, HYSTEROSCOPY, ABLATE ENDOMETRIUM NOVASURE, COMBINED  12/11/2012    Procedure: COMBINED DILATION AND CURETTAGE, HYSTEROSCOPY, ABLATE ENDOMETRIUM NOVASURE;  Hysteroscopy, Dilataion and Curettage with Endometrial Ablation,Novasure;  Surgeon: Dana Vance MD;  Location: [facility] OR     INSERT PORT VASCULAR ACCESS  3/6/2014    Procedure: INSERT PORT VASCULAR ACCESS;  Power Port Placement;  Surgeon: Oneal Stephens MD;  Location: [facility]     SURGICAL HISTORY OF -   1992    rhinoplasty      TUBAL LIGATION  1997        Family History   Problem Relation Age of Onset     Hypertension Father      C.A.D. Father      Cardiovascular Father      Cerebrovascular Disease Father      Allergies Son      Breast Cancer Mother        Social History     Socioeconomic History     Marital status:      Spouse name: Not on file     Number of children: Not on file     Years of education: Not on file     Highest education level: Not on file   Occupational History     Not on file   Tobacco Use     Smoking status: Never Smoker     Smokeless tobacco: Never Used   Substance and Sexual Activity     Alcohol use: Yes     Comment: couple drinks couple times a week     Drug use: Yes     Comment: some medicianl marijuana     Sexual activity: Yes     Partners: Male     Birth control/protection: Surgical, Condom   Other Topics Concern     Parent/sibling w/ CABG, MI or angioplasty before 65F 55M? Yes     Comment: father had CAD      Service No     Blood Transfusions No     Caffeine Concern No     Occupational Exposure No     Hobby Hazards No     Sleep Concern No     Stress Concern Yes     Weight Concern No     Special Diet Yes     Back Care No     Exercise Yes     Comment: walking,  stretching     Bike Helmet No     Seat Belt Yes     Self-Exams No   Social History Narrative     Not on file     Social Determinants of Health     Financial Resource Strain: Not on file   Food Insecurity: Not on file   Transportation Needs: Not on file   Physical Activity: Not on file   Stress: Not on file   Social Connections: Not on file   Intimate Partner Violence: Not on file   Housing Stability: Not on file       Outpatient Encounter Medications as of 9/19/2022   Medication Sig Dispense Refill     ampicillin (PRINCIPEN) 500 MG capsule Take 1 capsule (500 mg) by mouth two times daily 60 capsule 1     Ascorbic Acid (VITAMIN C ER PO) Take 1,000 mg by mouth 3 times daily        Black Cohosh 160 MG CAPS Take 2 capsules by mouth       calcium carbonate (OS-TAYLOR 600 MG [place]. [place]) 600 MG tablet Take 1 tablet by mouth 2 times daily (with meals)       cholecalciferol (VITAMIN D3) 18341 UNITS capsule Take 1 capsule by mouth daily       cyanocolbalamin (VITAMIN  B-12) 500 MCG tablet Take 500 mcg by mouth 2 times daily       drospirenone-ethinyl estradiol (ANDRE) 3-0.02 MG tablet Take 1 tablet by mouth daily 84 tablet 0     fluconazole (DIFLUCAN) 100 MG tablet Take 1 tablet at symptom onset; 1 tablet 3 days later if sx still present 3 tablet 7     HYDROcodone-acetaminophen (NORCO) 5-325 MG per tablet Take 1 tablet by mouth every 6 hours as needed for moderate to severe pain 12 tablet 0     Multiple Vitamins-Minerals (ZINC PO) Take 60 mg by mouth daily       Nutritional Supplements (ENSURE PLUS) LIQD Take by mouth 3 times daily (with meals)       Omega-3 Fatty Acids (FISH OIL OMEGA-3 PO)        ondansetron (ZOFRAN) 8 MG tablet Take 3 tablets (24 mg) by mouth every 8 hours as needed for nausea Place on all labels pt needs an appointment for further refills please schedule. 36 tablet 5     polyethylene glycol (MIRALAX) powder Take 17 g by mouth daily STIR 1 CAPFUL (17GM) IN 8 OZ OF LIQUID AND DRINK (Patient taking differently:  Take 17 g by mouth daily STIR 1 CAPFUL (17GM) IN 8 OZ OF LIQUID AND DRINK  May repeat up to QID per pt request) 527 g 0     POTASSIUM CHLORIDE ER PO Take 20 mEq by mouth 2 times daily       SUMATRIPTAN SUCCINATE PO Take 50 mg by mouth once Take 50 mg (1 tablet) at onset of headache, may repeat in 2 hours if needed       tretinoin (RETIN-A) 0.05 % external cream Apply topically At Bedtime 45 g 11     tretinoin (RETIN-A) 0.1 % external cream Apply topically At Bedtime 45 g 11     No facility-administered encounter medications on file as of 9/19/2022.             Review Of Systems  Skin: As above  Eyes: negative  Ears/Nose/Throat: negative  Respiratory: No shortness of breath, dyspnea on exertion, cough, or hemoptysis  Cardiovascular: negative  Gastrointestinal: negative  Genitourinary: negative  Musculoskeletal: negative  Neurologic: negative  Psychiatric: negative  Hematologic/Lymphatic/Immunologic: negative  Endocrine: negative      O:   Alert & Orientedx3, Mood & Affect wnl,    General appearance normal   Alert, oriented and in no acute distress    Rare pimples    Pulm: Breathing Normal, talking in normal sentences, no shortness of breath during conversation    Neuro/Psych: Orientation:Alert and Orientedx3 ; Mood/Affect:normal ; no anxiety or depression     A/P:  1.Acne doing well  Cont aldactone topical , ampicillin and topical tretinoin  Doing well  Return to clinic 4m onths  It was a pleasure speaking to Piper Long today.  Previous clinic  notes and pertinent laboratory tests were reviewed prior to Piper Long's visit.    Teledermatology information:  - Location of patient: home  - Location of teledermatologist: [facility]   - Reason teledermatology is appropriate: of National Emergency Regarding Coronavirus disease (COVID 19) Outbreak  - The patient's condition can safely be assessed using telemedicine: yes  - Method of transmission: store and forward teledermatology  - In-person dermatology visit  recommendation: no  - Service start time:1015am/pm  - Service end time:1035am/pm  - Date of report: 09/19/22        Again, thank you for allowing me to participate in the care of your patient.        Sincerely,        Dale Nix MD

## 2022-09-19 NOTE — PROGRESS NOTES
"    Piper Long is a 52 year old female who is being evaluated via a phone  visit.      The patient has been notified of following:     \"This phone  visit will be conducted via a call between you and your physician/provider. We have found that certain health care needs can be provided without the need for an in-person physical exam.  This service lets us provide the care you need with a video conversation.  If a prescription is necessary we can send it directly to your pharmacy.  If lab work is needed we can place an order for that and you can then stop by our lab to have the test done at a later time.    Phone visits are billed at different rates depending on your insurance coverage.  Please reach out to your insurance provider with any questions.    If during the course of the call the physician/provider feels a phone visit is not appropriate, you will not be charged for this service.\"    Patient has given verbal consent for phone visit? Yes    How would you like to obtain your AVS? Lora    Piper Long is a 52 year old year old female patient here today for f/u acne.  She notes things are better.  [facility]  She is on aldactone topical, ampicillin, and tretinoin, things are ok.  Patient has no other skin complaints today.  Remainder of the HPI, Meds, PMH, Allergies, FH, and SH was reviewed in chart.      Past Medical History:   Diagnosis Date     Crohn's colitis (H) 1999    [facility] [place]. [facility] been to the [facility] for consultion. Has tried Imuran, Remicade, Humira, Methotrexate     Menorrhagia 6/7/2013     Partial small bowel obstruction (H) 5/17/2013     PONV (postoperative nausea and vomiting)      SBO (small bowel obstruction) (H) 2/4/2013       Past Surgical History:   Procedure Laterality Date     ABDOMEN SURGERY  2013    x 3 stricture removal.      CHOLECYSTECTOMY, LAPOROSCOPIC       COLONOSCOPY  05/13/10     DILATION " AND CURETTAGE, HYSTEROSCOPY, ABLATE ENDOMETRIUM NOVASURE, COMBINED  12/11/2012    Procedure: COMBINED DILATION AND CURETTAGE, HYSTEROSCOPY, ABLATE ENDOMETRIUM NOVASURE;  Hysteroscopy, Dilataion and Curettage with Endometrial Ablation,Novasure;  Surgeon: Dana Vance MD;  Location: [facility] OR     INSERT PORT VASCULAR ACCESS  3/6/2014    Procedure: INSERT PORT VASCULAR ACCESS;  Power Port Placement;  Surgeon: Oneal Stephens MD;  Location: [facility]     SURGICAL HISTORY OF -   1992    rhinoplasty      TUBAL LIGATION  1997        Family History   Problem Relation Age of Onset     Hypertension Father      C.A.D. Father      Cardiovascular Father      Cerebrovascular Disease Father      Allergies Son      Breast Cancer Mother        Social History     Socioeconomic History     Marital status:      Spouse name: Not on file     Number of children: Not on file     Years of education: Not on file     Highest education level: Not on file   Occupational History     Not on file   Tobacco Use     Smoking status: Never Smoker     Smokeless tobacco: Never Used   Substance and Sexual Activity     Alcohol use: Yes     Comment: couple drinks couple times a week     Drug use: Yes     Comment: some medicianl marijuana     Sexual activity: Yes     Partners: Male     Birth control/protection: Surgical, Condom   Other Topics Concern     Parent/sibling w/ CABG, MI or angioplasty before 65F 55M? Yes     Comment: father had CAD      Service No     Blood Transfusions No     Caffeine Concern No     Occupational Exposure No     Hobby Hazards No     Sleep Concern No     Stress Concern Yes     Weight Concern No     Special Diet Yes     Back Care No     Exercise Yes     Comment: walking, stretching     Bike Helmet No     Seat Belt Yes     Self-Exams No   Social History Narrative     Not on file     Social Determinants of Health     Financial Resource Strain: Not on file   Food Insecurity: Not on file   Transportation Needs: Not  on file   Physical Activity: Not on file   Stress: Not on file   Social Connections: Not on file   Intimate Partner Violence: Not on file   Housing Stability: Not on file       Outpatient Encounter Medications as of 9/19/2022   Medication Sig Dispense Refill     ampicillin (PRINCIPEN) 500 MG capsule Take 1 capsule (500 mg) by mouth two times daily 60 capsule 1     Ascorbic Acid (VITAMIN C ER PO) Take 1,000 mg by mouth 3 times daily        Black Cohosh 160 MG CAPS Take 2 capsules by mouth       calcium carbonate (OS-TAYLOR 600 MG [place]. [place]) 600 MG tablet Take 1 tablet by mouth 2 times daily (with meals)       cholecalciferol (VITAMIN D3) 98535 UNITS capsule Take 1 capsule by mouth daily       cyanocolbalamin (VITAMIN  B-12) 500 MCG tablet Take 500 mcg by mouth 2 times daily       drospirenone-ethinyl estradiol (ANDRE) 3-0.02 MG tablet Take 1 tablet by mouth daily 84 tablet 0     fluconazole (DIFLUCAN) 100 MG tablet Take 1 tablet at symptom onset; 1 tablet 3 days later if sx still present 3 tablet 7     HYDROcodone-acetaminophen (NORCO) 5-325 MG per tablet Take 1 tablet by mouth every 6 hours as needed for moderate to severe pain 12 tablet 0     Multiple Vitamins-Minerals (ZINC PO) Take 60 mg by mouth daily       Nutritional Supplements (ENSURE PLUS) LIQD Take by mouth 3 times daily (with meals)       Omega-3 Fatty Acids (FISH OIL OMEGA-3 PO)        ondansetron (ZOFRAN) 8 MG tablet Take 3 tablets (24 mg) by mouth every 8 hours as needed for nausea Place on all labels pt needs an appointment for further refills please schedule. 36 tablet 5     polyethylene glycol (MIRALAX) powder Take 17 g by mouth daily STIR 1 CAPFUL (17GM) IN 8 OZ OF LIQUID AND DRINK (Patient taking differently: Take 17 g by mouth daily STIR 1 CAPFUL (17GM) IN 8 OZ OF LIQUID AND DRINK  May repeat up to QID per pt request) 527 g 0     POTASSIUM CHLORIDE ER PO Take 20 mEq by mouth 2 times daily       SUMATRIPTAN SUCCINATE PO Take 50 mg by mouth once Take 50  mg (1 tablet) at onset of headache, may repeat in 2 hours if needed       tretinoin (RETIN-A) 0.05 % external cream Apply topically At Bedtime 45 g 11     tretinoin (RETIN-A) 0.1 % external cream Apply topically At Bedtime 45 g 11     No facility-administered encounter medications on file as of 9/19/2022.             Review Of Systems  Skin: As above  Eyes: negative  Ears/Nose/Throat: negative  Respiratory: No shortness of breath, dyspnea on exertion, cough, or hemoptysis  Cardiovascular: negative  Gastrointestinal: negative  Genitourinary: negative  Musculoskeletal: negative  Neurologic: negative  Psychiatric: negative  Hematologic/Lymphatic/Immunologic: negative  Endocrine: negative      O:   Alert & Orientedx3, Mood & Affect wnl,    General appearance normal   Alert, oriented and in no acute distress    Rare pimples    Pulm: Breathing Normal, talking in normal sentences, no shortness of breath during conversation    Neuro/Psych: Orientation:Alert and Orientedx3 ; Mood/Affect:normal ; no anxiety or depression     A/P:  1.Acne doing well  Cont aldactone topical , ampicillin and topical tretinoin  Doing well  Return to clinic 4m onths  It was a pleasure speaking to Piper Long today.  Previous clinic  notes and pertinent laboratory tests were reviewed prior to Piper Long's visit.    Teledermatology information:  - Location of patient: home  - Location of teledermatologist: [facility]   - Reason teledermatology is appropriate: of National Emergency Regarding Coronavirus disease (COVID 19) Outbreak  - The patient's condition can safely be assessed using telemedicine: yes  - Method of transmission: store and forward teledermatology  - In-person dermatology visit recommendation: no  - Service start time:1015am/pm  - Service end time:1035am/pm  - Date of report: 09/19/22

## 2022-12-14 DIAGNOSIS — L70.0 ACNE VULGARIS: ICD-10-CM

## 2022-12-14 NOTE — TELEPHONE ENCOUNTER
Requested Prescriptions   Pending Prescriptions Disp Refills     ampicillin (PRINCIPEN) 500 MG capsule 60 capsule 1     Sig: Take 1 capsule (500 mg) by mouth two times daily       There is no refill protocol information for this order        Last office visit: 11/3/2021 with prescribing provider:  Dr. Nix    Future Office Visit:   Next 5 appointments (look out 90 days)    Jan 31, 2023  2:00 PM  (Arrive by 1:45 PM)  Return Visit with Dale Nix MD  [facility] [address] [address] [id number] [phone]               [facility]

## 2022-12-15 RX ORDER — AMPICILLIN TRIHYDRATE 500 MG
500 CAPSULE ORAL
Qty: 60 CAPSULE | Refills: 0 | Status: SHIPPED | OUTPATIENT
Start: 2022-12-15 | End: 2024-01-29

## 2022-12-15 NOTE — TELEPHONE ENCOUNTER
Per 9-19-22 phone dictation:     Acne doing well  Cont aldactone topical , ampicillin and topical tretinoin  Doing well  Return to clinic 4m onths    Scheduled for January 2023 Derm f/u appt.     Rowan Lutz RN

## 2023-01-04 ENCOUNTER — TELEPHONE (OUTPATIENT)
Dept: DERMATOLOGY | Facility: CLINIC | Age: 53
End: 2023-01-04

## 2023-01-04 DIAGNOSIS — L70.0 ACNE VULGARIS: ICD-10-CM

## 2023-01-04 NOTE — TELEPHONE ENCOUNTER
See Note below.     Looks like a 1 year supply of compounded [facility] back on [facility]-22, so she should have enough of that medication.     Do you want her on Ampicillin for another 4 months until April appointment?     Or do you want her to send photos of her skin?...    Please advise. Rowan Lutz RN

## 2023-01-04 NOTE — TELEPHONE ENCOUNTER
[facility] Call Center    Phone Message    May a detailed message be left on voicemail: no     Reason for Call: Other: Pt states she had to rescheduled her January visit due to a conflict with work and was rescheduled for April 24th and would like a call back to discuss if she can have refills of her ampicillin (PRINCIPEN) 500 MG capsule and     Action Taken: Other: [facility]    Travel Screening: Not Applicable

## 2023-01-05 NOTE — TELEPHONE ENCOUNTER
Message left to return call. Looks like last in person office visit was in November 2021.     She did complete a phone visit on 9-19-22.     Can schedule for sooner ph visit and ask pt to send photos.    Rowan Lutz RN

## 2023-01-09 RX ORDER — AMPICILLIN TRIHYDRATE 500 MG
500 CAPSULE ORAL
Qty: 60 CAPSULE | Refills: 0 | OUTPATIENT
Start: 2023-01-09

## 2023-01-09 NOTE — TELEPHONE ENCOUNTER
Patient has now scheduled an in person appointment for 4-24-23. Phone appt was advised via message, but chose in person. Rowan Lutz RN

## 2023-04-24 ENCOUNTER — OFFICE VISIT (OUTPATIENT)
Dept: DERMATOLOGY | Facility: CLINIC | Age: 53
End: 2023-04-24
Payer: COMMERCIAL

## 2023-04-24 DIAGNOSIS — L70.0 ACNE VULGARIS: Primary | ICD-10-CM

## 2023-04-24 PROCEDURE — 99213 OFFICE O/P EST LOW 20 MIN: CPT | Performed by: DERMATOLOGY

## 2023-04-24 NOTE — LETTER
4/24/2023         RE: Piper Long  [address]  [place] [address]        Dear Colleague,    Thank you for referring your patient, Piper Long, to the [facility]. Please see a copy of my visit note below.    Piper Long is an extremely pleasant 52 year old year old female patient here today for f/u acne was on nam, topical aldactone, tretinoin, Was told to stop nam by Gyn.  She is getting breakouts.  She is not using tretinoin daily.  She is using oral  Ampicillin prn but she does not like taking oral pills.  Patient has no other skin complaints today.  Remainder of the HPI, Meds, PMH, Allergies, FH, and SH was reviewed in chart.      Past Medical History:   Diagnosis Date     Crohn's colitis (H) 1999    [facility]. Has been to the [facility] for consultion. Has tried Imuran, Remicade, Humira, Methotrexate     Menorrhagia 6/7/2013     Partial small bowel obstruction (H) 5/17/2013     PONV (postoperative nausea and vomiting)      SBO (small bowel obstruction) (H) 2/4/2013       Past Surgical History:   Procedure Laterality Date     ABDOMEN SURGERY  2013    x 3 stricture removal.      CHOLECYSTECTOMY, LAPOROSCOPIC       COLONOSCOPY  05/13/10     DILATION AND CURETTAGE, HYSTEROSCOPY, ABLATE ENDOMETRIUM NOVASURE, COMBINED  12/11/2012    Procedure: COMBINED DILATION AND CURETTAGE, HYSTEROSCOPY, ABLATE ENDOMETRIUM NOVASURE;  Hysteroscopy, Dilataion and Curettage with Endometrial Ablation,Novasure;  Surgeon: Dana Vance MD;  Location: [facility]     INSERT PORT VASCULAR ACCESS  3/6/2014    Procedure: INSERT PORT VASCULAR ACCESS;  Power Port Placement;  Surgeon: Oneal Stephens MD;  Location: [facility]     SURGICAL HISTORY OF -   1992    rhinoplasty      TUBAL LIGATION  1997        Family History   Problem Relation Age of Onset     Hypertension Father      C.A.D. Father      Cardiovascular Father      Cerebrovascular Disease Father      Allergies Son       Breast Cancer Mother        Social History     Socioeconomic History     Marital status:      Spouse name: Not on file     Number of children: Not on file     Years of education: Not on file     Highest education level: Not on file   Occupational History     Not on file   Tobacco Use     Smoking status: Never     Smokeless tobacco: Never   Vaping Use     Vaping status: Not on file   Substance and Sexual Activity     Alcohol use: Yes     Comment: couple drinks couple times a week     Drug use: Yes     Comment: some medicianl marijuana     Sexual activity: Yes     Partners: Male     Birth control/protection: Surgical, Condom   Other Topics Concern     Parent/sibling w/ CABG, MI or angioplasty before 65F 55M? Yes     Comment: father had CAD      Service No     Blood Transfusions No     Caffeine Concern No     Occupational Exposure No     Hobby Hazards No     Sleep Concern No     Stress Concern Yes     Weight Concern No     Special Diet Yes     Back Care No     Exercise Yes     Comment: walking, stretching     Bike Helmet No     Seat Belt Yes     Self-Exams No   Social History Narrative     Not on file     Social Determinants of Health     Financial Resource Strain: Not on file   Food Insecurity: Not on file   Transportation Needs: Not on file   Physical Activity: Not on file   Stress: Not on file   Social Connections: Not on file   Intimate Partner Violence: Not on file   Housing Stability: Not on file       Outpatient Encounter Medications as of 4/24/2023   Medication Sig Dispense Refill     ampicillin (PRINCIPEN) 500 MG capsule Take 1 capsule (500 mg) by mouth two times daily 60 capsule 0     COMPOUNDED NON-CONTROLLED SUBSTANCE (CMPD RX) - PHARMACY TO MIX COMPOUNDED MEDICATION Topical aldactone 5% cream apply daily to face 30 g 11     ondansetron (ZOFRAN) 8 MG tablet Take 3 tablets (24 mg) by mouth every 8 hours as needed for nausea Place on all labels pt needs an appointment for further refills  please schedule. 36 tablet 5     Ascorbic Acid (VITAMIN C ER PO) Take 1,000 mg by mouth 3 times daily        Black Cohosh 160 MG CAPS Take 2 capsules by mouth       calcium carbonate (OS-TAYLOR 600 MG [place]. [place]) 600 MG tablet Take 1 tablet by mouth 2 times daily (with meals)       cholecalciferol (VITAMIN D3) 23364 UNITS capsule Take 1 capsule by mouth daily       cyanocolbalamin (VITAMIN  B-12) 500 MCG tablet Take 500 mcg by mouth 2 times daily       drospirenone-ethinyl estradiol (ANDRE) 3-0.02 MG tablet Take 1 tablet by mouth daily (Patient not taking: Reported on 4/24/2023) 84 tablet 0     fluconazole (DIFLUCAN) 100 MG tablet Take 1 tablet at symptom onset; 1 tablet 3 days later if sx still present (Patient not taking: Reported on 4/24/2023) 3 tablet 7     HYDROcodone-acetaminophen (NORCO) 5-325 MG per tablet Take 1 tablet by mouth every 6 hours as needed for moderate to severe pain 12 tablet 0     Multiple Vitamins-Minerals (ZINC PO) Take 60 mg by mouth daily       Nutritional Supplements (ENSURE PLUS) LIQD Take by mouth 3 times daily (with meals)       Omega-3 Fatty Acids (FISH OIL OMEGA-3 PO)        polyethylene glycol (MIRALAX) powder Take 17 g by mouth daily STIR 1 CAPFUL (17GM) IN 8 OZ OF LIQUID AND DRINK (Patient not taking: Reported on 4/24/2023) 527 g 0     POTASSIUM CHLORIDE ER PO Take 20 mEq by mouth 2 times daily       SUMATRIPTAN SUCCINATE PO Take 50 mg by mouth once Take 50 mg (1 tablet) at onset of headache, may repeat in 2 hours if needed       tretinoin (RETIN-A) 0.05 % external cream Apply topically At Bedtime (Patient not taking: Reported on 4/24/2023) 45 g 11     tretinoin (RETIN-A) 0.1 % external cream Apply topically At Bedtime (Patient not taking: Reported on 4/24/2023) 45 g 11     No facility-administered encounter medications on file as of 4/24/2023.             O:   NAD, WDWN, Alert & Oriented, Mood & Affect wnl, Vitals stable   Here today alone   General appearance normal   Vitals  stable   Alert, oriented and in no acute distress   Face with rare excoriations      Eyes: Conjunctivae/lids:Normal     ENT: Lips, buccal mucosa, tongue: normal    MSK:Normal    Cardiovascular: peripheral edema none    Pulm: Breathing Normal    Neuro/Psych: Orientation:Alert and Orientedx3 ; Mood/Affect:normal       A/P:  1. Hx of Chron's and acne  Cont topical aldactone daily  Clinda/ tretinoin 0.03% cream daily  It was a pleasure speaking to Piper Long today.  Previous clinic notes and pertinent laboratory tests were reviewed prior to Piper Long's visit.  Return to clinic 3 months virtual         Again, thank you for allowing me to participate in the care of your patient.        Sincerely,        Dale Nix MD

## 2023-04-24 NOTE — PROGRESS NOTES
Piper Long is an extremely pleasant 52 year old year old female patient here today for f/u acne was on nam, topical aldactone, tretinoin, Was told to stop nam by Gyn.  She is getting breakouts.  She is not using tretinoin daily.  She is using oral  Ampicillin prn but she does not like taking oral pills.  Patient has no other skin complaints today.  Remainder of the HPI, Meds, PMH, Allergies, FH, and SH was reviewed in chart.      Past Medical History:   Diagnosis Date     Crohn's colitis (H) 1999    [facility]. Has been to the [facility] for consultion. Has tried Imuran, Remicade, Humira, Methotrexate     Menorrhagia 6/7/2013     Partial small bowel obstruction (H) 5/17/2013     PONV (postoperative nausea and vomiting)      SBO (small bowel obstruction) (H) 2/4/2013       Past Surgical History:   Procedure Laterality Date     ABDOMEN SURGERY  2013    x 3 stricture removal.      CHOLECYSTECTOMY, LAPOROSCOPIC       COLONOSCOPY  05/13/10     DILATION AND CURETTAGE, HYSTEROSCOPY, ABLATE ENDOMETRIUM NOVASURE, COMBINED  12/11/2012    Procedure: COMBINED DILATION AND CURETTAGE, HYSTEROSCOPY, ABLATE ENDOMETRIUM NOVASURE;  Hysteroscopy, Dilataion and Curettage with Endometrial Ablation,Novasure;  Surgeon: Dana Vance MD;  Location: [facility]     INSERT PORT VASCULAR ACCESS  3/6/2014    Procedure: INSERT PORT VASCULAR ACCESS;  Power Port Placement;  Surgeon: Oneal Stephens MD;  Location: [facility]     SURGICAL HISTORY OF -   1992    rhinoplasty      TUBAL LIGATION  1997        Family History   Problem Relation Age of Onset     Hypertension Father      C.A.D. Father      Cardiovascular Father      Cerebrovascular Disease Father      Allergies Son      Breast Cancer Mother        Social History     Socioeconomic History     Marital status:      Spouse name: Not on file     Number of children: Not on file     Years of education: Not on file     Highest education level: Not on file    Occupational History     Not on file   Tobacco Use     Smoking status: Never     Smokeless tobacco: Never   Vaping Use     Vaping status: Not on file   Substance and Sexual Activity     Alcohol use: Yes     Comment: couple drinks couple times a week     Drug use: Yes     Comment: some medicianl marijuana     Sexual activity: Yes     Partners: Male     Birth control/protection: Surgical, Condom   Other Topics Concern     Parent/sibling w/ CABG, MI or angioplasty before 65F 55M? Yes     Comment: father had CAD      Service No     Blood Transfusions No     Caffeine Concern No     Occupational Exposure No     Hobby Hazards No     Sleep Concern No     Stress Concern Yes     Weight Concern No     Special Diet Yes     Back Care No     Exercise Yes     Comment: walking, stretching     Bike Helmet No     Seat Belt Yes     Self-Exams No   Social History Narrative     Not on file     Social Determinants of Health     Financial Resource Strain: Not on file   Food Insecurity: Not on file   Transportation Needs: Not on file   Physical Activity: Not on file   Stress: Not on file   Social Connections: Not on file   Intimate Partner Violence: Not on file   Housing Stability: Not on file       Outpatient Encounter Medications as of 4/24/2023   Medication Sig Dispense Refill     ampicillin (PRINCIPEN) 500 MG capsule Take 1 capsule (500 mg) by mouth two times daily 60 capsule 0     COMPOUNDED NON-CONTROLLED SUBSTANCE (CMPD RX) - PHARMACY TO MIX COMPOUNDED MEDICATION Topical aldactone 5% cream apply daily to face 30 g 11     ondansetron (ZOFRAN) 8 MG tablet Take 3 tablets (24 mg) by mouth every 8 hours as needed for nausea Place on all labels pt needs an appointment for further refills please schedule. 36 tablet 5     Ascorbic Acid (VITAMIN C ER PO) Take 1,000 mg by mouth 3 times daily        Black Cohosh 160 MG CAPS Take 2 capsules by mouth       calcium carbonate (OS-TAYLOR 600 MG [place]. [place]) 600 MG tablet Take 1 tablet by mouth  2 times daily (with meals)       cholecalciferol (VITAMIN D3) 21738 UNITS capsule Take 1 capsule by mouth daily       cyanocolbalamin (VITAMIN  B-12) 500 MCG tablet Take 500 mcg by mouth 2 times daily       drospirenone-ethinyl estradiol (ANDRE) 3-0.02 MG tablet Take 1 tablet by mouth daily (Patient not taking: Reported on 4/24/2023) 84 tablet 0     fluconazole (DIFLUCAN) 100 MG tablet Take 1 tablet at symptom onset; 1 tablet 3 days later if sx still present (Patient not taking: Reported on 4/24/2023) 3 tablet 7     HYDROcodone-acetaminophen (NORCO) 5-325 MG per tablet Take 1 tablet by mouth every 6 hours as needed for moderate to severe pain 12 tablet 0     Multiple Vitamins-Minerals (ZINC PO) Take 60 mg by mouth daily       Nutritional Supplements (ENSURE PLUS) LIQD Take by mouth 3 times daily (with meals)       Omega-3 Fatty Acids (FISH OIL OMEGA-3 PO)        polyethylene glycol (MIRALAX) powder Take 17 g by mouth daily STIR 1 CAPFUL (17GM) IN 8 OZ OF LIQUID AND DRINK (Patient not taking: Reported on 4/24/2023) 527 g 0     POTASSIUM CHLORIDE ER PO Take 20 mEq by mouth 2 times daily       SUMATRIPTAN SUCCINATE PO Take 50 mg by mouth once Take 50 mg (1 tablet) at onset of headache, may repeat in 2 hours if needed       tretinoin (RETIN-A) 0.05 % external cream Apply topically At Bedtime (Patient not taking: Reported on 4/24/2023) 45 g 11     tretinoin (RETIN-A) 0.1 % external cream Apply topically At Bedtime (Patient not taking: Reported on 4/24/2023) 45 g 11     No facility-administered encounter medications on file as of 4/24/2023.             O:   NAD, WDWN, Alert & Oriented, Mood & Affect wnl, Vitals stable   Here today alone   General appearance normal   Vitals stable   Alert, oriented and in no acute distress   Face with rare excoriations      Eyes: Conjunctivae/lids:Normal     ENT: Lips, buccal mucosa, tongue: normal    MSK:Normal    Cardiovascular: peripheral edema none    Pulm: Breathing  Normal    Neuro/Psych: Orientation:Alert and Orientedx3 ; Mood/Affect:normal       A/P:  1. Hx of Chron's and acne  Cont topical aldactone daily  Clinda/ tretinoin 0.03% cream daily  It was a pleasure speaking to Piper Long today.  Previous clinic notes and pertinent laboratory tests were reviewed prior to Piper Long's visit.  Return to clinic 3 months virtual

## 2023-07-10 ENCOUNTER — OFFICE VISIT (OUTPATIENT)
Dept: DERMATOLOGY | Facility: CLINIC | Age: 53
End: 2023-07-10
Payer: COMMERCIAL

## 2023-07-10 DIAGNOSIS — L70.0 ACNE VULGARIS: Primary | ICD-10-CM

## 2023-07-10 PROCEDURE — 99213 OFFICE O/P EST LOW 20 MIN: CPT | Performed by: DERMATOLOGY

## 2023-07-10 ASSESSMENT — PAIN SCALES - GENERAL: PAINLEVEL: NO PAIN (0)

## 2023-07-10 NOTE — PROGRESS NOTES
Piper Long is an extremely pleasant 53 year old year old female patient here today for f/u acne.  She complains of cysts and squeezing oil out of face.  She is using clinda, Tretinoin and aldactone topically.  Patient has no other skin complaints today.  Remainder of the HPI, Meds, PMH, Allergies, FH, and SH was reviewed in chart.      Past Medical History:   Diagnosis Date     Crohn's colitis (H) 1999    [facility]. Has been to the [facility] for consultion. Has tried Imuran, Remicade, Humira, Methotrexate     Menorrhagia 6/7/2013     Partial small bowel obstruction (H) 5/17/2013     PONV (postoperative nausea and vomiting)      SBO (small bowel obstruction) (H) 2/4/2013       Past Surgical History:   Procedure Laterality Date     ABDOMEN SURGERY  2013    x 3 stricture removal.      CHOLECYSTECTOMY, LAPOROSCOPIC       COLONOSCOPY  05/13/10     DILATION AND CURETTAGE, HYSTEROSCOPY, ABLATE ENDOMETRIUM NOVASURE, COMBINED  12/11/2012    Procedure: COMBINED DILATION AND CURETTAGE, HYSTEROSCOPY, ABLATE ENDOMETRIUM NOVASURE;  Hysteroscopy, Dilataion and Curettage with Endometrial Ablation,Novasure;  Surgeon: Dana Vance MD;  Location: [facility]     INSERT PORT VASCULAR ACCESS  3/6/2014    Procedure: INSERT PORT VASCULAR ACCESS;  Power Port Placement;  Surgeon: Oneal Stephens MD;  Location: [facility]     SURGICAL HISTORY OF -   1992    rhinoplasty      TUBAL LIGATION  1997        Family History   Problem Relation Age of Onset     Hypertension Father      C.A.D. Father      Cardiovascular Father      Cerebrovascular Disease Father      Allergies Son      Breast Cancer Mother        Social History     Socioeconomic History     Marital status:      Spouse name: Not on file     Number of children: Not on file     Years of education: Not on file     Highest education level: Not on file   Occupational History     Not on file   Tobacco Use     Smoking status: Never     Smokeless  tobacco: Never   Substance and Sexual Activity     Alcohol use: Yes     Comment: couple drinks couple times a week     Drug use: Yes     Comment: some medicianl marijuana     Sexual activity: Yes     Partners: Male     Birth control/protection: Surgical, Condom   Other Topics Concern     Parent/sibling w/ CABG, MI or angioplasty before 65F 55M? Yes     Comment: father had CAD      Service No     Blood Transfusions No     Caffeine Concern No     Occupational Exposure No     Hobby Hazards No     Sleep Concern No     Stress Concern Yes     Weight Concern No     Special Diet Yes     Back Care No     Exercise Yes     Comment: walking, stretching     Bike Helmet No     Seat Belt Yes     Self-Exams No   Social History Narrative     Not on file     Social Determinants of Health     Financial Resource Strain: Not on file   Food Insecurity: Not on file   Transportation Needs: Not on file   Physical Activity: Not on file   Stress: Not on file   Social Connections: Not on file   Intimate Partner Violence: Not on file   Housing Stability: Not on file       Outpatient Encounter Medications as of 7/10/2023   Medication Sig Dispense Refill     ampicillin (PRINCIPEN) 500 MG capsule Take 1 capsule (500 mg) by mouth two times daily 60 capsule 0     Ascorbic Acid (VITAMIN C ER PO) Take 1,000 mg by mouth 3 times daily        Black Cohosh 160 MG CAPS Take 2 capsules by mouth       calcium carbonate (OS-TAYLOR 600 MG [place]. [place]) 600 MG tablet Take 1 tablet by mouth 2 times daily (with meals)       cholecalciferol (VITAMIN D3) 51311 UNITS capsule Take 1 capsule by mouth daily       COMPOUNDED NON-CONTROLLED SUBSTANCE (CMPD RX) - PHARMACY TO MIX COMPOUNDED MEDICATION Apply daily. Clindamycin 1%/Tretinoin 0.03% cream 30 g 11     COMPOUNDED NON-CONTROLLED SUBSTANCE (CMPD RX) - PHARMACY TO MIX COMPOUNDED MEDICATION 5% aldactone cream apply daily 30 g 11     COMPOUNDED NON-CONTROLLED SUBSTANCE (CMPD RX) - PHARMACY TO MIX COMPOUNDED  MEDICATION Topical aldactone 5% cream apply daily to face 30 g 11     cyanocolbalamin (VITAMIN  B-12) 500 MCG tablet Take 500 mcg by mouth 2 times daily       drospirenone-ethinyl estradiol (ANDRE) 3-0.02 MG tablet Take 1 tablet by mouth daily (Patient not taking: Reported on 4/24/2023) 84 tablet 0     fluconazole (DIFLUCAN) 100 MG tablet Take 1 tablet at symptom onset; 1 tablet 3 days later if sx still present (Patient not taking: Reported on 4/24/2023) 3 tablet 7     HYDROcodone-acetaminophen (NORCO) 5-325 MG per tablet Take 1 tablet by mouth every 6 hours as needed for moderate to severe pain 12 tablet 0     Multiple Vitamins-Minerals (ZINC PO) Take 60 mg by mouth daily       Nutritional Supplements (ENSURE PLUS) LIQD Take by mouth 3 times daily (with meals)       Omega-3 Fatty Acids (FISH OIL OMEGA-3 PO)        ondansetron (ZOFRAN) 8 MG tablet Take 3 tablets (24 mg) by mouth every 8 hours as needed for nausea Place on all labels pt needs an appointment for further refills please schedule. 36 tablet 5     polyethylene glycol (MIRALAX) powder Take 17 g by mouth daily STIR 1 CAPFUL (17GM) IN 8 OZ OF LIQUID AND DRINK (Patient not taking: Reported on 4/24/2023) 527 g 0     POTASSIUM CHLORIDE ER PO Take 20 mEq by mouth 2 times daily       SUMATRIPTAN SUCCINATE PO Take 50 mg by mouth once Take 50 mg (1 tablet) at onset of headache, may repeat in 2 hours if needed       tretinoin (RETIN-A) 0.05 % external cream Apply topically At Bedtime (Patient not taking: Reported on 4/24/2023) 45 g 11     tretinoin (RETIN-A) 0.1 % external cream Apply topically At Bedtime (Patient not taking: Reported on 4/24/2023) 45 g 11     No facility-administered encounter medications on file as of 7/10/2023.             O:   NAD, WDWN, Alert & Oriented, Mood & Affect wnl, Vitals stable   Here today alone   General appearance normal   Vitals stable   Alert, oriented and in no acute distress     Face clear of acne      Eyes:  Conjunctivae/lids:Normal     ENT: Lips, buccal mucosa, tongue: normal    MSK:Normal    Cardiovascular: peripheral edema none    Pulm: Breathing Normal    Neuro/Psych: Orientation:Alert and Orientedx3 ; Mood/Affect:normal       A/P:  1. Acne  Normal oil production discussed with patient   Lasers discussed with patient she declines  accutane discussed with patient she declines  Cont topical aldactone daily  Add tretinoin ascorbic acid daily  Return to clinic 3 months  It was a pleasure speaking to Piper Long today.  Previous clinic notes and pertinent laboratory tests were reviewed prior to Piper Long's visit.

## 2023-07-10 NOTE — LETTER
7/10/2023         RE: Piper Long  [address]  [place] [address]        Dear Colleague,    Thank you for referring your patient, Piper Long, to the [facility]. Please see a copy of my visit note below.    Piper Long is an extremely pleasant 53 year old year old female patient here today for f/u acne.  She complains of cysts and squeezing oil out of face.  She is using clinda, Tretinoin and aldactone topically.  Patient has no other skin complaints today.  Remainder of the HPI, Meds, PMH, Allergies, FH, and SH was reviewed in chart.      Past Medical History:   Diagnosis Date     Crohn's colitis (H) 1999    [facility]. Has been to the [facility] for consultion. Has tried Imuran, Remicade, Humira, Methotrexate     Menorrhagia 6/7/2013     Partial small bowel obstruction (H) 5/17/2013     PONV (postoperative nausea and vomiting)      SBO (small bowel obstruction) (H) 2/4/2013       Past Surgical History:   Procedure Laterality Date     ABDOMEN SURGERY  2013    x 3 stricture removal.      CHOLECYSTECTOMY, LAPOROSCOPIC       COLONOSCOPY  05/13/10     DILATION AND CURETTAGE, HYSTEROSCOPY, ABLATE ENDOMETRIUM NOVASURE, COMBINED  12/11/2012    Procedure: COMBINED DILATION AND CURETTAGE, HYSTEROSCOPY, ABLATE ENDOMETRIUM NOVASURE;  Hysteroscopy, Dilataion and Curettage with Endometrial Ablation,Novasure;  Surgeon: Dana Vance MD;  Location: [facility]     INSERT PORT VASCULAR ACCESS  3/6/2014    Procedure: INSERT PORT VASCULAR ACCESS;  Power Port Placement;  Surgeon: Oneal Stephens MD;  Location: [facility]     SURGICAL HISTORY OF -   1992    rhinoplasty      TUBAL LIGATION  1997        Family History   Problem Relation Age of Onset     Hypertension Father      C.A.D. Father      Cardiovascular Father      Cerebrovascular Disease Father      Allergies Son      Breast Cancer Mother        Social History     Socioeconomic History     Marital status:       Spouse name: Not on file     Number of children: Not on file     Years of education: Not on file     Highest education level: Not on file   Occupational History     Not on file   Tobacco Use     Smoking status: Never     Smokeless tobacco: Never   Substance and Sexual Activity     Alcohol use: Yes     Comment: couple drinks couple times a week     Drug use: Yes     Comment: some medicianl marijuana     Sexual activity: Yes     Partners: Male     Birth control/protection: Surgical, Condom   Other Topics Concern     Parent/sibling w/ CABG, MI or angioplasty before 65F 55M? Yes     Comment: father had CAD      Service No     Blood Transfusions No     Caffeine Concern No     Occupational Exposure No     Hobby Hazards No     Sleep Concern No     Stress Concern Yes     Weight Concern No     Special Diet Yes     Back Care No     Exercise Yes     Comment: walking, stretching     Bike Helmet No     Seat Belt Yes     Self-Exams No   Social History Narrative     Not on file     Social Determinants of Health     Financial Resource Strain: Not on file   Food Insecurity: Not on file   Transportation Needs: Not on file   Physical Activity: Not on file   Stress: Not on file   Social Connections: Not on file   Intimate Partner Violence: Not on file   Housing Stability: Not on file       Outpatient Encounter Medications as of 7/10/2023   Medication Sig Dispense Refill     ampicillin (PRINCIPEN) 500 MG capsule Take 1 capsule (500 mg) by mouth two times daily 60 capsule 0     Ascorbic Acid (VITAMIN C ER PO) Take 1,000 mg by mouth 3 times daily        Black Cohosh 160 MG CAPS Take 2 capsules by mouth       calcium carbonate (OS-TAYLOR 600 MG [place]. CA) 600 MG tablet Take 1 tablet by mouth 2 times daily (with meals)       cholecalciferol (VITAMIN D3) 06193 UNITS capsule Take 1 capsule by mouth daily       COMPOUNDED NON-CONTROLLED SUBSTANCE (CMPD RX) - PHARMACY TO MIX COMPOUNDED MEDICATION Apply daily. Clindamycin  1%/Tretinoin 0.03% cream 30 g 11     COMPOUNDED NON-CONTROLLED SUBSTANCE (CMPD RX) - PHARMACY TO MIX COMPOUNDED MEDICATION 5% aldactone cream apply daily 30 g 11     COMPOUNDED NON-CONTROLLED SUBSTANCE (CMPD RX) - PHARMACY TO MIX COMPOUNDED MEDICATION Topical aldactone 5% cream apply daily to face 30 g 11     cyanocolbalamin (VITAMIN  B-12) 500 MCG tablet Take 500 mcg by mouth 2 times daily       drospirenone-ethinyl estradiol (ANDRE) 3-0.02 MG tablet Take 1 tablet by mouth daily (Patient not taking: Reported on 4/24/2023) 84 tablet 0     fluconazole (DIFLUCAN) 100 MG tablet Take 1 tablet at symptom onset; 1 tablet 3 days later if sx still present (Patient not taking: Reported on 4/24/2023) 3 tablet 7     HYDROcodone-acetaminophen (NORCO) 5-325 MG per tablet Take 1 tablet by mouth every 6 hours as needed for moderate to severe pain 12 tablet 0     Multiple Vitamins-Minerals (ZINC PO) Take 60 mg by mouth daily       Nutritional Supplements (ENSURE PLUS) LIQD Take by mouth 3 times daily (with meals)       Omega-3 Fatty Acids (FISH OIL OMEGA-3 PO)        ondansetron (ZOFRAN) 8 MG tablet Take 3 tablets (24 mg) by mouth every 8 hours as needed for nausea Place on all labels pt needs an appointment for further refills please schedule. 36 tablet 5     polyethylene glycol (MIRALAX) powder Take 17 g by mouth daily STIR 1 CAPFUL (17GM) IN 8 OZ OF LIQUID AND DRINK (Patient not taking: Reported on 4/24/2023) 527 g 0     POTASSIUM CHLORIDE ER PO Take 20 mEq by mouth 2 times daily       SUMATRIPTAN SUCCINATE PO Take 50 mg by mouth once Take 50 mg (1 tablet) at onset of headache, may repeat in 2 hours if needed       tretinoin (RETIN-A) 0.05 % external cream Apply topically At Bedtime (Patient not taking: Reported on 4/24/2023) 45 g 11     tretinoin (RETIN-A) 0.1 % external cream Apply topically At Bedtime (Patient not taking: Reported on 4/24/2023) 45 g 11     No facility-administered encounter medications on file as of 7/10/2023.              O:   NAD, WDWN, Alert & Oriented, Mood & Affect wnl, Vitals stable   Here today alone   General appearance normal   Vitals stable   Alert, oriented and in no acute distress     Face clear of acne      Eyes: Conjunctivae/lids:Normal     ENT: Lips, buccal mucosa, tongue: normal    MSK:Normal    Cardiovascular: peripheral edema none    Pulm: Breathing Normal    Neuro/Psych: Orientation:Alert and Orientedx3 ; Mood/Affect:normal       A/P:  1. Acne  Normal oil production discussed with patient   Lasers discussed with patient she declines  accutane discussed with patient she declines  Cont topical aldactone daily  Add tretinoin ascorbic acid daily  Return to clinic 3 months  It was a pleasure speaking to Piper Long today.  Previous clinic notes and pertinent laboratory tests were reviewed prior to Piper Long's visit.        Again, thank you for allowing me to participate in the care of your patient.        Sincerely,        Dale Nix MD

## 2023-08-01 ENCOUNTER — TELEPHONE (OUTPATIENT)
Dept: GASTROENTEROLOGY | Facility: CLINIC | Age: 53
End: 2023-08-01

## 2023-08-01 DIAGNOSIS — E46 MALNUTRITION (H): ICD-10-CM

## 2023-08-01 DIAGNOSIS — K50.90 CROHN'S DISEASE (H): ICD-10-CM

## 2023-08-01 NOTE — TELEPHONE ENCOUNTER
PA Initiation    Medication: PEPTAMEN 1.5 PO LIQD  Insurance Company: [place] Medicaid ([facility]) - Phone [phone] Fax [phone]  Pharmacy Filling the Rx: [facility] - [address]  Filling Pharmacy Phone: [phone]  Filling Pharmacy Fax: [phone]  Start Date: 8/1/2023

## 2023-08-07 NOTE — TELEPHONE ENCOUNTER
PRIOR AUTHORIZATION DENIED    Medication: PEPTAMEN 1.5 PO LIQD    Insurance Company: [place] Medicaid ([facility]) - Phone [phone] Fax [phone]    Denial Date: 8/7/2023    Denial Rational: Per insurance rep case is not re viewable as pharmacy is not a [place] Medicaid eligible provider.

## 2023-08-09 RX ORDER — NUTRITIONAL SUPPLEMENT
LIQUID (ML) ORAL
Qty: 1000 ML | Refills: 4 | Status: SHIPPED | OUTPATIENT
Start: 2023-08-09

## 2023-08-09 NOTE — TELEPHONE ENCOUNTER
Reorder peptamen to local pharmacy.    Prior Authorization Retail Medication Request     Please seek PA through Medicaid. Reorder Rx to local pharmacy as suggested.     Medication/Dose:     Disp Refills Start End WINIFRED     Nutritional Supplements (PEPTAMEN 1.5) LIQD 1000 mL 4 2023   No   Si mL every hour for 12 hours or 60 mL every hour for 18 hours. Flush tube with 100 mL every four hours, then 30 mL every four hours once adequate oral hydration has been reached. Eat protein bars and drink protein shakes for added caloric intake.   Sent to pharmacy as: Peptamen 1.5 Oral Liquid   Class: E-Prescribe   Order: [id number]   E-Prescribing Status: Sent to pharmacy (2023  2:54 PM CDT)               ICD code (if different than what is on RX):    Previously Tried and Failed:    Rationale:       Insurance Name:    Insurance ID:          Pharmacy Information (if different than what is on RX)  Name:    Phone:

## 2023-08-11 NOTE — TELEPHONE ENCOUNTER
Resent in with new pharmacy NPI number    PA Initiation    Medication: PEPTAMEN 1.5 PO LIQD  Insurance Company: [place] Medicaid ([facility]) - Phone [phone] Fax [phone]  Pharmacy Filling the Rx: [facility] - [place], [place] - [address] [place] [address]  Filling Pharmacy Phone: [phone]  Filling Pharmacy Fax: [phone]  Start Date: 8/1/2023

## 2023-08-28 NOTE — TELEPHONE ENCOUNTER
Contacted insurance plan to follow up on request.  Per rep case was denied due to incorrect [facility] code.  Switched code from  to  and resent in via right fax.

## 2023-08-30 NOTE — TELEPHONE ENCOUNTER
Contacted insurance plan to follow up on request.  Per rep case is under review and review can take 10 business days.  Per rep patient's plan is ending 9/30/2023

## 2023-09-11 NOTE — TELEPHONE ENCOUNTER
Prior Authorization Approval    Medication: PEPTAMEN 1.5 PO LIQD  Authorization Effective Date: 7/31/2023  Authorization Expiration Date: 9/30/2023  Approved Dose/Quantity:   Reference #:     Insurance Company: [place] Medicaid ([facility] [email or web address] - Phone [phone] Fax [phone]  Expected CoPay:       CoPay Card Available:      Financial Assistance Needed:   Which Pharmacy is filling the prescription: [id number] - [place], [place] - [address]  Pharmacy Notified: Yes  Patient Notified: Yes **Instructed pharmacy to notify patient when script is ready to /ship.**    Per Shaista at [facility] case was approved, case number [id number].  Per rep, patient plan ends on 9/30/2023.     Per pharmacy she isn't aware how to bill DME but will talk with their corporate office to figure out billing.

## 2023-09-11 NOTE — TELEPHONE ENCOUNTER
Spoke with Piper, she states she got  and is under her 's insurance. Ask pt to upload her insurance information to her chart so that we can start the PA with her new insurance. Pt aware she can get Peptamen through the end of this month.

## 2023-09-21 ENCOUNTER — TELEPHONE (OUTPATIENT)
Dept: GASTROENTEROLOGY | Facility: CLINIC | Age: 53
End: 2023-09-21
Payer: COMMERCIAL

## 2023-09-21 DIAGNOSIS — E46 MALNUTRITION (H): ICD-10-CM

## 2023-09-21 DIAGNOSIS — K50.90 CROHN'S DISEASE (H): ICD-10-CM

## 2023-09-21 NOTE — TELEPHONE ENCOUNTER
[facility] Call Center    Phone Message    May a detailed message be left on voicemail: yes     Reason for Call: Medication Question or concern regarding medication   Prescription Clarification  Name of Medication:   Nutritional Supplements (PEPTAMEN 1.5) LIQD 1000 mL 4     Prescribing Provider: : Arabella Schulte   [facility]    What on the order needs clarification? Pharmacy gave pt 4 8oz cartons, individual   , normally gets 11 cases at a time , please review       Action Taken: Message routed to:  [facility] GI    Travel Screening: Not Applicable

## 2023-09-25 NOTE — TELEPHONE ENCOUNTER
Spoke with Diana-pharmacist at [facility]. She states 2000ml = 4 bottles    September 25, 2023    Called Piper, Left message and contact info to return call regarding: peptamen rx

## 2023-09-26 RX ORDER — NUTRITIONAL SUPPLEMENT
3 LIQUID (ML) ORAL 3 TIMES DAILY
Qty: 22500 ML | Refills: 11 | Status: SHIPPED | OUTPATIENT
Start: 2023-09-26 | End: 2023-09-26

## 2023-09-26 RX ORDER — NUTRITIONAL SUPPLEMENT
4 LIQUID (ML) ORAL 4 TIMES DAILY
Qty: 24000 ML | Refills: 11 | Status: SHIPPED | OUTPATIENT
Start: 2023-09-26

## 2023-09-26 NOTE — TELEPHONE ENCOUNTER
Spoke with Piper, she states she was drinking 3-4 cartons of peptamen/day.     Confirm with research study, pt was given 4 cartons per day while in the study.    Reorder peptamen 4 cartons/day.    Spoke with Diana -pharmacist, she confirm receipt of new Rx for the 4 cartons of peptamen/day. She will get that ordered.

## 2023-10-16 ENCOUNTER — VIRTUAL VISIT (OUTPATIENT)
Dept: DERMATOLOGY | Facility: CLINIC | Age: 53
End: 2023-10-16
Payer: COMMERCIAL

## 2023-10-16 ENCOUNTER — TELEPHONE (OUTPATIENT)
Dept: DERMATOLOGY | Facility: CLINIC | Age: 53
End: 2023-10-16

## 2023-10-16 DIAGNOSIS — L70.0 ACNE VULGARIS: Primary | ICD-10-CM

## 2023-10-16 PROCEDURE — 99443 PR PHYSICIAN TELEPHONE EVALUATION 21-30 MIN: CPT | Performed by: DERMATOLOGY

## 2023-10-16 RX ORDER — SPIRONOLACTONE 25 MG/1
25 TABLET ORAL DAILY
Qty: 60 TABLET | Refills: 3 | Status: SHIPPED | OUTPATIENT
Start: 2023-10-16

## 2023-10-16 NOTE — PROGRESS NOTES
"    Piper Long is a 53 year old female who is being evaluated via a phone  visit.      The patient has been notified of following:     \"This phone  visit will be conducted via a call between you and your physician/provider. We have found that certain health care needs can be provided without the need for an in-person physical exam.  This service lets us provide the care you need with a video conversation.  If a prescription is necessary we can send it directly to your pharmacy.  If lab work is needed we can place an order for that and you can then stop by our lab to have the test done at a later time.    Phone visits are billed at different rates depending on your insurance coverage.  Please reach out to your insurance provider with any questions.    If during the course of the call the physician/provider feels a phone visit is not appropriate, you will not be charged for this service.\"    Patient has given verbal consent for phone visit? Yes    How would you like to obtain your AVS? Lora    Piper Long is a 53 year old year old female patient here today for follow up acne.  Using tretinoin vit c and ampicillin and tretinoin.  Still getting breakouts.   .Patient has no other skin complaints today.  Remainder of the HPI, Meds, PMH, Allergies, FH, and SH was reviewed in chart.      Past Medical History:   Diagnosis Date    Crohn's colitis (H) 1999    [facility] [place]. Has tried Imuran, Remicade, Humira, Methotrexate    Menorrhagia 6/7/2013    Partial small bowel obstruction (H) 5/17/2013    PONV (postoperative nausea and vomiting)     SBO (small bowel obstruction) (H) 2/4/2013       Past Surgical History:   Procedure Laterality Date    ABDOMEN SURGERY  2013    x 3 stricture removal.     CHOLECYSTECTOMY, LAPOROSCOPIC      COLONOSCOPY  05/13/10    DILATION AND CURETTAGE, HYSTEROSCOPY, ABLATE ENDOMETRIUM NOVASURE, COMBINED  12/11/2012    " Procedure: COMBINED DILATION AND CURETTAGE, HYSTEROSCOPY, ABLATE ENDOMETRIUM NOVASURE;  Hysteroscopy, Dilataion and Curettage with Endometrial Ablation,Novasure;  Surgeon: Dana Vance MD;  Location: [facility]    INSERT PORT VASCULAR ACCESS  3/6/2014    Procedure: INSERT PORT VASCULAR ACCESS;  Power Port Placement;  Surgeon: Oneal Stephens MD;  Location: [facility]    SURGICAL HISTORY OF -   1992    rhinoplasty     TUBAL LIGATION  1997        Family History   Problem Relation Age of Onset    Hypertension Father     C.A.D. Father     Cardiovascular Father     Cerebrovascular Disease Father     Allergies Son     Breast Cancer Mother        Social History     Socioeconomic History    Marital status:      Spouse name: Not on file    Number of children: Not on file    Years of education: Not on file    Highest education level: Not on file   Occupational History    Not on file   Tobacco Use    Smoking status: Never    Smokeless tobacco: Never   Substance and Sexual Activity    Alcohol use: Yes     Comment: couple drinks couple times a week    Drug use: Yes     Comment: some medicianl marijuana    Sexual activity: Yes     Partners: Male     Birth control/protection: Surgical, Condom   Other Topics Concern    Parent/sibling w/ CABG, MI or angioplasty before 65F 55M? Yes     Comment: father had CAD     Service No    Blood Transfusions No    Caffeine Concern No    Occupational Exposure No    Hobby Hazards No    Sleep Concern No    Stress Concern Yes    Weight Concern No    Special Diet Yes    Back Care No    Exercise Yes     Comment: walking, stretching    Bike Helmet No    Seat Belt Yes    Self-Exams No   Social History Narrative    Not on file     Social Determinants of Health     Financial Resource Strain: Not on file   Food Insecurity: Not on file   Transportation Needs: Not on file   Physical Activity: Not on file   Stress: Not on file   Social Connections: Not on file   Interpersonal Safety: Not on  file   Housing Stability: Not on file       Outpatient Encounter Medications as of 10/16/2023   Medication Sig Dispense Refill    ampicillin (PRINCIPEN) 500 MG capsule Take 1 capsule (500 mg) by mouth two times daily 60 capsule 0    Ascorbic Acid (VITAMIN C ER PO) Take 1,000 mg by mouth 3 times daily       Black Cohosh 160 MG CAPS Take 2 capsules by mouth      calcium carbonate (OS-TAYLOR 600 MG [place]. CA) 600 MG tablet Take 1 tablet by mouth 2 times daily (with meals)      cholecalciferol (VITAMIN D3) 76367 UNITS capsule Take 1 capsule by mouth daily      COMPOUNDED NON-CONTROLLED SUBSTANCE (CMPD RX) - PHARMACY TO MIX COMPOUNDED MEDICATION Tretinoin 0.1%/ ascorbic acid 1.67% cream alterante with tretionoin/clinda every other evening 30 g 11    COMPOUNDED NON-CONTROLLED SUBSTANCE (CMPD RX) - PHARMACY TO MIX COMPOUNDED MEDICATION Apply daily. Clindamycin 1%/Tretinoin 0.03% cream 30 g 11    COMPOUNDED NON-CONTROLLED SUBSTANCE (CMPD RX) - PHARMACY TO MIX COMPOUNDED MEDICATION 5% aldactone cream apply daily 30 g 11    COMPOUNDED NON-CONTROLLED SUBSTANCE (CMPD RX) - PHARMACY TO MIX COMPOUNDED MEDICATION Topical aldactone 5% cream apply daily to face 30 g 11    cyanocolbalamin (VITAMIN  B-12) 500 MCG tablet Take 500 mcg by mouth 2 times daily      drospirenone-ethinyl estradiol (ANDRE) 3-0.02 MG tablet Take 1 tablet by mouth daily (Patient not taking: Reported on 4/24/2023) 84 tablet 0    fluconazole (DIFLUCAN) 100 MG tablet Take 1 tablet at symptom onset; 1 tablet 3 days later if sx still present (Patient not taking: Reported on 4/24/2023) 3 tablet 7    HYDROcodone-acetaminophen (NORCO) 5-325 MG per tablet Take 1 tablet by mouth every 6 hours as needed for moderate to severe pain 12 tablet 0    Multiple Vitamins-Minerals (ZINC PO) Take 60 mg by mouth daily      Nutritional Supplements (ENSURE PLUS) LIQD Take by mouth 3 times daily (with meals)      Nutritional Supplements (PEPTAMEN 1.5) LIQD Take 4 each by mouth 4 times daily  22195 mL 11    Nutritional Supplements (PEPTAMEN 1.5) LIQD 90 mL every hour for 12 hours or 60 mL every hour for 18 hours. Flush tube with 100 mL every four hours, then 30 mL every four hours once adequate oral hydration has been reached. Eat protein bars and drink protein shakes for added caloric intake. 1000 mL 4    Omega-3 Fatty Acids (FISH OIL OMEGA-3 PO)       ondansetron (ZOFRAN) 8 MG tablet Take 3 tablets (24 mg) by mouth every 8 hours as needed for nausea Place on all labels pt needs an appointment for further refills please schedule. 36 tablet 5    polyethylene glycol (MIRALAX) powder Take 17 g by mouth daily STIR 1 CAPFUL (17GM) IN 8 OZ OF LIQUID AND DRINK (Patient not taking: Reported on 4/24/2023) 527 g 0    POTASSIUM CHLORIDE ER PO Take 20 mEq by mouth 2 times daily      SUMATRIPTAN SUCCINATE PO Take 50 mg by mouth once Take 50 mg (1 tablet) at onset of headache, may repeat in 2 hours if needed      tretinoin (RETIN-A) 0.05 % external cream Apply topically At Bedtime (Patient not taking: Reported on 4/24/2023) 45 g 11    tretinoin (RETIN-A) 0.1 % external cream Apply topically At Bedtime (Patient not taking: Reported on 4/24/2023) 45 g 11     No facility-administered encounter medications on file as of 10/16/2023.             Review Of Systems  Skin: As above  Eyes: negative  Ears/Nose/Throat: negative  Respiratory: No shortness of breath, dyspnea on exertion, cough, or hemoptysis  Cardiovascular: negative  Gastrointestinal: negative  Genitourinary: negative  Musculoskeletal: negative  Neurologic: negative  Psychiatric: negative  Hematologic/Lymphatic/Immunologic: negative  Endocrine: negative      O:   Alert & Orientedx3, Mood & Affect wnl,    General appearance normal   Alert, oriented and in no acute distress    Inflammatory papules on face per patient     Pulm: Breathing Normal, talking in normal sentences, no shortness of breath during conversation    Neuro/Psych: Orientation:Alert and Orientedx3 ;  Mood/Affect:normal ; no anxiety or depression     A/P:  1.Acne  Cont tretinoin, vitamin c and aldactone  Cont ampicillin   Accutane discussed with patient she declines  Oral aldactone discussed with patient   Start low dose aldactone  Computer states allergy will check with patient  Return to clinic 3 months  It was a pleasure speaking to Piper Long today.  Previous clinic  notes and pertinent laboratory tests were reviewed prior to Piper Long's visit.    Teledermatology information:  - Location of patient: home  - Location of teledermatologist: [facility]   - Reason teledermatology is appropriate: of National Emergency Regarding Coronavirus disease (COVID 19) Outbreak  - The patient's condition can safely be assessed using telemedicine: yes  - Method of transmission: store and forward teledermatology  - In-person dermatology visit recommendation: no  - Service start time:1020am/pm  - Service end time:1041am/pm  - Date of report: 10/16/23

## 2023-10-16 NOTE — TELEPHONE ENCOUNTER
----- Message from Dale Nix MD sent at 10/16/2023 10:42 AM CDT -----  Is patient allergic to oral aldactone computer states she is?

## 2023-10-16 NOTE — LETTER
"    10/16/2023         RE: Piper Long  [address]  [place] [address]        Dear Colleague,    Thank you for referring your patient, Piper Long, to the [facility]. Please see a copy of my visit note below.        Piper Long is a 53 year old female who is being evaluated via a phone  visit.      The patient has been notified of following:     \"This phone  visit will be conducted via a call between you and your physician/provider. We have found that certain health care needs can be provided without the need for an in-person physical exam.  This service lets us provide the care you need with a video conversation.  If a prescription is necessary we can send it directly to your pharmacy.  If lab work is needed we can place an order for that and you can then stop by our lab to have the test done at a later time.    Phone visits are billed at different rates depending on your insurance coverage.  Please reach out to your insurance provider with any questions.    If during the course of the call the physician/provider feels a phone visit is not appropriate, you will not be charged for this service.\"    Patient has given verbal consent for phone visit? Yes    How would you like to obtain your AVS? MyChart    Piper Long is a 53 year old year old female patient here today for follow up acne.  Using tretinoin vit c and ampicillin and tretinoin.  Still getting breakouts.   .Patient has no other skin complaints today.  Remainder of the HPI, Meds, PMH, Allergies, FH, and SH was reviewed in chart.      Past Medical History:   Diagnosis Date     Crohn's colitis (H) 1999    [facility] [place]. Has tried Imuran, Remicade, Humira, Methotrexate     Menorrhagia 6/7/2013     Partial small bowel obstruction (H) 5/17/2013     PONV (postoperative nausea and vomiting)      SBO (small bowel obstruction) (H) 2/4/2013       Past Surgical " History:   Procedure Laterality Date     ABDOMEN SURGERY  2013    x 3 stricture removal.      CHOLECYSTECTOMY, LAPOROSCOPIC       COLONOSCOPY  05/13/10     DILATION AND CURETTAGE, HYSTEROSCOPY, ABLATE ENDOMETRIUM NOVASURE, COMBINED  12/11/2012    Procedure: COMBINED DILATION AND CURETTAGE, HYSTEROSCOPY, ABLATE ENDOMETRIUM NOVASURE;  Hysteroscopy, Dilataion and Curettage with Endometrial Ablation,Novasure;  Surgeon: Dana Vance MD;  Location: [facility]     INSERT PORT VASCULAR ACCESS  3/6/2014    Procedure: INSERT PORT VASCULAR ACCESS;  Power Port Placement;  Surgeon: Oneal Stephens MD;  Location: [facility]     SURGICAL HISTORY OF -   1992    rhinoplasty      TUBAL LIGATION  1997        Family History   Problem Relation Age of Onset     Hypertension Father      C.A.D. Father      Cardiovascular Father      Cerebrovascular Disease Father      Allergies Son      Breast Cancer Mother        Social History     Socioeconomic History     Marital status:      Spouse name: Not on file     Number of children: Not on file     Years of education: Not on file     Highest education level: Not on file   Occupational History     Not on file   Tobacco Use     Smoking status: Never     Smokeless tobacco: Never   Substance and Sexual Activity     Alcohol use: Yes     Comment: couple drinks couple times a week     Drug use: Yes     Comment: some medicianl marijuana     Sexual activity: Yes     Partners: Male     Birth control/protection: Surgical, Condom   Other Topics Concern     Parent/sibling w/ CABG, MI or angioplasty before 65F 55M? Yes     Comment: father had CAD      Service No     Blood Transfusions No     Caffeine Concern No     Occupational Exposure No     Hobby Hazards No     Sleep Concern No     Stress Concern Yes     Weight Concern No     Special Diet Yes     Back Care No     Exercise Yes     Comment: walking, stretching     Bike Helmet No     Seat Belt Yes     Self-Exams No   Social History Narrative      Not on file     Social Determinants of Health     Financial Resource Strain: Not on file   Food Insecurity: Not on file   Transportation Needs: Not on file   Physical Activity: Not on file   Stress: Not on file   Social Connections: Not on file   Interpersonal Safety: Not on file   Housing Stability: Not on file       Outpatient Encounter Medications as of 10/16/2023   Medication Sig Dispense Refill     ampicillin (PRINCIPEN) 500 MG capsule Take 1 capsule (500 mg) by mouth two times daily 60 capsule 0     Ascorbic Acid (VITAMIN C ER PO) Take 1,000 mg by mouth 3 times daily        Black Cohosh 160 MG CAPS Take 2 capsules by mouth       calcium carbonate (OS-TAYLOR 600 MG [place]. CA) 600 MG tablet Take 1 tablet by mouth 2 times daily (with meals)       cholecalciferol (VITAMIN D3) 86856 UNITS capsule Take 1 capsule by mouth daily       COMPOUNDED NON-CONTROLLED SUBSTANCE (CMPD RX) - PHARMACY TO MIX COMPOUNDED MEDICATION Tretinoin 0.1%/ ascorbic acid 1.67% cream alterante with tretionoin/clinda every other evening 30 g 11     COMPOUNDED NON-CONTROLLED SUBSTANCE (CMPD RX) - PHARMACY TO MIX COMPOUNDED MEDICATION Apply daily. Clindamycin 1%/Tretinoin 0.03% cream 30 g 11     COMPOUNDED NON-CONTROLLED SUBSTANCE (CMPD RX) - PHARMACY TO MIX COMPOUNDED MEDICATION 5% aldactone cream apply daily 30 g 11     COMPOUNDED NON-CONTROLLED SUBSTANCE (CMPD RX) - PHARMACY TO MIX COMPOUNDED MEDICATION Topical aldactone 5% cream apply daily to face 30 g 11     cyanocolbalamin (VITAMIN  B-12) 500 MCG tablet Take 500 mcg by mouth 2 times daily       drospirenone-ethinyl estradiol (ANDRE) 3-0.02 MG tablet Take 1 tablet by mouth daily (Patient not taking: Reported on 4/24/2023) 84 tablet 0     fluconazole (DIFLUCAN) 100 MG tablet Take 1 tablet at symptom onset; 1 tablet 3 days later if sx still present (Patient not taking: Reported on 4/24/2023) 3 tablet 7     HYDROcodone-acetaminophen (NORCO) 5-325 MG per tablet Take 1 tablet by mouth every 6  hours as needed for moderate to severe pain 12 tablet 0     Multiple Vitamins-Minerals (ZINC PO) Take 60 mg by mouth daily       Nutritional Supplements (ENSURE PLUS) LIQD Take by mouth 3 times daily (with meals)       Nutritional Supplements (PEPTAMEN 1.5) LIQD Take 4 each by mouth 4 times daily 28655 mL 11     Nutritional Supplements (PEPTAMEN 1.5) LIQD 90 mL every hour for 12 hours or 60 mL every hour for 18 hours. Flush tube with 100 mL every four hours, then 30 mL every four hours once adequate oral hydration has been reached. Eat protein bars and drink protein shakes for added caloric intake. 1000 mL 4     Omega-3 Fatty Acids (FISH OIL OMEGA-3 PO)        ondansetron (ZOFRAN) 8 MG tablet Take 3 tablets (24 mg) by mouth every 8 hours as needed for nausea Place on all labels pt needs an appointment for further refills please schedule. 36 tablet 5     polyethylene glycol (MIRALAX) powder Take 17 g by mouth daily STIR 1 CAPFUL (17GM) IN 8 OZ OF LIQUID AND DRINK (Patient not taking: Reported on 4/24/2023) 527 g 0     POTASSIUM CHLORIDE ER PO Take 20 mEq by mouth 2 times daily       SUMATRIPTAN SUCCINATE PO Take 50 mg by mouth once Take 50 mg (1 tablet) at onset of headache, may repeat in 2 hours if needed       tretinoin (RETIN-A) 0.05 % external cream Apply topically At Bedtime (Patient not taking: Reported on 4/24/2023) 45 g 11     tretinoin (RETIN-A) 0.1 % external cream Apply topically At Bedtime (Patient not taking: Reported on 4/24/2023) 45 g 11     No facility-administered encounter medications on file as of 10/16/2023.             Review Of Systems  Skin: As above  Eyes: negative  Ears/Nose/Throat: negative  Respiratory: No shortness of breath, dyspnea on exertion, cough, or hemoptysis  Cardiovascular: negative  Gastrointestinal: negative  Genitourinary: negative  Musculoskeletal: negative  Neurologic: negative  Psychiatric: negative  Hematologic/Lymphatic/Immunologic: negative  Endocrine: negative      O:    Alert & Orientedx3, Mood & Affect wnl,    General appearance normal   Alert, oriented and in no acute distress    Inflammatory papules on face per patient     Pulm: Breathing Normal, talking in normal sentences, no shortness of breath during conversation    Neuro/Psych: Orientation:Alert and Orientedx3 ; Mood/Affect:normal ; no anxiety or depression     A/P:  1.Acne  Cont tretinoin, vitamin c and aldactone  Cont ampicillin   Accutane discussed with patient she declines  Oral aldactone discussed with patient   Start low dose aldactone  Computer states allergy will check with patient  Return to clinic 3 months  It was a pleasure speaking to Piper Long today.  Previous clinic  notes and pertinent laboratory tests were reviewed prior to Piper Long's visit.    Teledermatology information:  - Location of patient: home  - Location of teledermatologist: [facility]   - Reason teledermatology is appropriate: of National Emergency Regarding Coronavirus disease (COVID 19) Outbreak  - The patient's condition can safely be assessed using telemedicine: yes  - Method of transmission: store and forward teledermatology  - In-person dermatology visit recommendation: no  - Service start time:1020am/pm  - Service end time:1041am/pm  - Date of report: 10/16/23      Again, thank you for allowing me to participate in the care of your patient.        Sincerely,        Dale Nix MD

## 2023-10-16 NOTE — TELEPHONE ENCOUNTER
Called and spoke with pt, she says she had severe abdominal pain when she tried the tom in the past, but that she would be willing to try it again if Dr. Nix thinks it would be a good idea. Please advise, thank you!    Amber ONTIVEROS RN  Dermatology   [phone]

## 2024-01-08 ENCOUNTER — TELEPHONE (OUTPATIENT)
Dept: DERMATOLOGY | Facility: CLINIC | Age: 54
End: 2024-01-08
Payer: COMMERCIAL

## 2024-01-08 NOTE — TELEPHONE ENCOUNTER
[facility] Call Center    Phone Message    May a detailed message be left on voicemail: yes     Reason for Call: Medication Question or concern regarding medication   Prescription Clarification    Prescribing Provider: Dr. Nix      What on the order needs clarification? Pt is wondering if she needs a stronger antibiotic? She is having severe symptoms and not improving. Please call pt to discuss.   Thank you      Action Taken: Message routed to:  Other: [facility]  derm    Travel Screening: Not Applicable                                                                    No

## 2024-01-09 NOTE — TELEPHONE ENCOUNTER
Looks like patient was to return for Acne recheck in 3 months per 10-16-23 dictation, so is due to be seen again now.      Patient Contact    Attempt # 1    Was call answered?  No    Called patient. No answer. Left message to call back. Clinic number was provided.     Rowan Lutz RN    [facility]   [phone]

## 2024-01-10 NOTE — TELEPHONE ENCOUNTER
Patient Contact    Attempt # 2    Was call answered?  Yes    Called patient. Informed her that per Dinah's last note that she would need to be seen before he would prescribe anything else. Patient stated that she would like to wait since she thinks that it is her supplements that she is taking. Writer informed her that we are booking 6+ months out. Patient stated that she will run out of her medication before then. Patient was then scheduled with Dr. Nix.     Maryam Landis LPN   [facility]   [phone]

## 2024-01-29 ENCOUNTER — OFFICE VISIT (OUTPATIENT)
Dept: DERMATOLOGY | Facility: CLINIC | Age: 54
End: 2024-01-29
Payer: COMMERCIAL

## 2024-01-29 DIAGNOSIS — L70.0 ACNE VULGARIS: Primary | ICD-10-CM

## 2024-01-29 PROCEDURE — 99213 OFFICE O/P EST LOW 20 MIN: CPT | Performed by: DERMATOLOGY

## 2024-01-29 RX ORDER — AMPICILLIN TRIHYDRATE 500 MG
500 CAPSULE ORAL
Qty: 60 CAPSULE | Refills: 0 | Status: SHIPPED | OUTPATIENT
Start: 2024-01-29 | End: 2024-05-13

## 2024-01-29 ASSESSMENT — PAIN SCALES - GENERAL: PAINLEVEL: NO PAIN (0)

## 2024-01-29 NOTE — NURSING NOTE
Chief Complaint   Patient presents with    Acne     Follow up, new wash otc sal. Acid, feels like things are worse        There were no vitals filed for this visit.  Wt Readings from Last 1 Encounters:   10/20/21 52 kg (114 lb 9.6 oz)       Maryam Landis LPN .................1/29/2024

## 2024-01-29 NOTE — PROGRESS NOTES
Piper Sotelo is an extremely pleasant 53 year old year old female patient here today for follow up acne.  She is on ampicillin and aldactone.  She notes continued acne and welts on skin, that she has to pick and squeeze her skin every day to get rid of infection.  Patient has no other skin complaints today.  Remainder of the HPI, Meds, PMH, Allergies, FH, and SH was reviewed in chart.      Past Medical History:   Diagnosis Date    Crohn's colitis (H) 1999    [facility] [place]. Has been to the [facility] for consultion. Has tried Imuran, Remicade, Humira, Methotrexate    Menorrhagia 6/7/2013    Partial small bowel obstruction (H) 5/17/2013    PONV (postoperative nausea and vomiting)     SBO (small bowel obstruction) (H) 2/4/2013       Past Surgical History:   Procedure Laterality Date    ABDOMEN SURGERY  2013    x 3 stricture removal.     CHOLECYSTECTOMY, LAPOROSCOPIC      COLONOSCOPY  05/13/10    DILATION AND CURETTAGE, HYSTEROSCOPY, ABLATE ENDOMETRIUM NOVASURE, COMBINED  12/11/2012    Procedure: COMBINED DILATION AND CURETTAGE, HYSTEROSCOPY, ABLATE ENDOMETRIUM NOVASURE;  Hysteroscopy, Dilataion and Curettage with Endometrial Ablation,Novasure;  Surgeon: Dana Vance MD;  Location: [facility] OR    INSERT PORT VASCULAR ACCESS  3/6/2014    Procedure: INSERT PORT VASCULAR ACCESS;  Power Port Placement;  Surgeon: Oneal Stephens MD;  Location: [facility]    SURGICAL HISTORY OF -   1992    rhinoplasty     TUBAL LIGATION  1997        Family History   Problem Relation Age of Onset    Hypertension Father     C.A.D. Father     Cardiovascular Father     Cerebrovascular Disease Father     Allergies Son     Breast Cancer Mother        Social History     Socioeconomic History    Marital status:      Spouse name: Not on file    Number of children: Not on file    Years of education: Not on file    Highest education level: Not on file   Occupational History    Not on file   Tobacco Use    Smoking  status: Never    Smokeless tobacco: Never   Substance and Sexual Activity    Alcohol use: Yes     Comment: couple drinks couple times a week    Drug use: Yes     Comment: some medicianl marijuana    Sexual activity: Yes     Partners: Male     Birth control/protection: Surgical, Condom   Other Topics Concern    Parent/sibling w/ CABG, MI or angioplasty before 65F 55M? Yes     Comment: father had CAD     Service No    Blood Transfusions No    Caffeine Concern No    Occupational Exposure No    Hobby Hazards No    Sleep Concern No    Stress Concern Yes    Weight Concern No    Special Diet Yes    Back Care No    Exercise Yes     Comment: walking, stretching    Bike Helmet No    Seat Belt Yes    Self-Exams No   Social History Narrative    Not on file     Social Determinants of Health     Financial Resource Strain: Not on file   Food Insecurity: Not on file   Transportation Needs: Not on file   Physical Activity: Not on file   Stress: Not on file   Social Connections: Not on file   Interpersonal Safety: Not on file   Housing Stability: Not on file       Outpatient Encounter Medications as of 1/29/2024   Medication Sig Dispense Refill    ampicillin (PRINCIPEN) 500 MG capsule Take 1 capsule (500 mg) by mouth two times daily 60 capsule 0    spironolactone (ALDACTONE) 25 MG tablet Take 1 tablet (25 mg) by mouth daily 60 tablet 3    Ascorbic Acid (VITAMIN C ER PO) Take 1,000 mg by mouth 3 times daily       Black Cohosh 160 MG CAPS Take 2 capsules by mouth      calcium carbonate (OS-TAYLOR 600 MG [place]. CA) 600 MG tablet Take 1 tablet by mouth 2 times daily (with meals)      cholecalciferol (VITAMIN D3) 25553 UNITS capsule Take 1 capsule by mouth daily      COMPOUNDED NON-CONTROLLED SUBSTANCE (CMPD RX) - PHARMACY TO MIX COMPOUNDED MEDICATION Tretinoin 0.1%/ ascorbic acid 1.67% cream alterante with tretionoin/clinda every other evening (Patient not taking: Reported on 1/29/2024) 30 g 11    COMPOUNDED NON-CONTROLLED SUBSTANCE  (CMPD RX) - PHARMACY TO MIX COMPOUNDED MEDICATION Apply daily. Clindamycin 1%/Tretinoin 0.03% cream (Patient not taking: Reported on 1/29/2024) 30 g 11    COMPOUNDED NON-CONTROLLED SUBSTANCE (CMPD RX) - PHARMACY TO MIX COMPOUNDED MEDICATION 5% aldactone cream apply daily (Patient not taking: Reported on 1/29/2024) 30 g 11    COMPOUNDED NON-CONTROLLED SUBSTANCE (CMPD RX) - PHARMACY TO MIX COMPOUNDED MEDICATION Topical aldactone 5% cream apply daily to face (Patient not taking: Reported on 1/29/2024) 30 g 11    cyanocolbalamin (VITAMIN  B-12) 500 MCG tablet Take 500 mcg by mouth 2 times daily      drospirenone-ethinyl estradiol (ANDRE) 3-0.02 MG tablet Take 1 tablet by mouth daily (Patient not taking: Reported on 4/24/2023) 84 tablet 0    fluconazole (DIFLUCAN) 100 MG tablet Take 1 tablet at symptom onset; 1 tablet 3 days later if sx still present (Patient not taking: Reported on 4/24/2023) 3 tablet 7    HYDROcodone-acetaminophen (NORCO) 5-325 MG per tablet Take 1 tablet by mouth every 6 hours as needed for moderate to severe pain 12 tablet 0    Multiple Vitamins-Minerals (ZINC PO) Take 60 mg by mouth daily      Nutritional Supplements (ENSURE PLUS) LIQD Take by mouth 3 times daily (with meals)      Nutritional Supplements (PEPTAMEN 1.5) LIQD Take 4 each by mouth 4 times daily 16804 mL 11    Nutritional Supplements (PEPTAMEN 1.5) LIQD 90 mL every hour for 12 hours or 60 mL every hour for 18 hours. Flush tube with 100 mL every four hours, then 30 mL every four hours once adequate oral hydration has been reached. Eat protein bars and drink protein shakes for added caloric intake. 1000 mL 4    Omega-3 Fatty Acids (FISH OIL OMEGA-3 PO)       ondansetron (ZOFRAN) 8 MG tablet Take 3 tablets (24 mg) by mouth every 8 hours as needed for nausea Place on all labels pt needs an appointment for further refills please schedule. 36 tablet 5    polyethylene glycol (MIRALAX) powder Take 17 g by mouth daily STIR 1 CAPFUL (17GM) IN 8 OZ  OF LIQUID AND DRINK (Patient not taking: Reported on 4/24/2023) 527 g 0    POTASSIUM CHLORIDE ER PO Take 20 mEq by mouth 2 times daily      SUMATRIPTAN SUCCINATE PO Take 50 mg by mouth once Take 50 mg (1 tablet) at onset of headache, may repeat in 2 hours if needed      tretinoin (RETIN-A) 0.05 % external cream Apply topically At Bedtime (Patient not taking: Reported on 4/24/2023) 45 g 11    tretinoin (RETIN-A) 0.1 % external cream Apply topically At Bedtime (Patient not taking: Reported on 4/24/2023) 45 g 11     No facility-administered encounter medications on file as of 1/29/2024.             O:   NAD, WDWN, Alert & Oriented, Mood & Affect wnl, Vitals stable   General appearance normal   Vitals stable   Alert, oriented and in no acute distress     No visible acne on face  Excoriations on face      Eyes: Conjunctivae/lids:Normal     ENT: Lips, buccal mucosa, tongue: normal    MSK:Normal    Cardiovascular: peripheral edema none    Pulm: Breathing Normal    Neuro/Psych: Orientation:Alert and Orientedx3 ; Mood/Affect:normal       A/P:  Acne  I discussed with patient that I do not see any lesions on her face today   She notes this is because she has to pick her skin every day to squeeze out the infection  I offered referral to [facility] because I do not think I am helping her, she declines  I asked her to send photos on [employer] and her reply was that the phone filters will make her skin look better than it is.     I discussed with patient that I am not sure how else to help her and offered a second opinion  She declines  I asked patient to stop picking skin so I could see the lesions, she declines  I discussed with patient topical aldactone she would like to try this  It was a pleasure speaking to Piper Sotelo today.

## 2024-01-29 NOTE — LETTER
1/29/2024         RE: Piper Sotelo  [address]  [place] [address]        Dear Colleague,    Thank you for referring your patient, Piper Sotelo, to the [facility]. Please see a copy of my visit note below.    Piper Sotelo is an extremely pleasant 53 year old year old female patient here today for follow up acne.  She is on ampicillin and aldactone.  She notes continued acne and welts on skin, that she has to pick and squeeze her skin every day to get rid of infection.  Patient has no other skin complaints today.  Remainder of the HPI, Meds, PMH, Allergies, FH, and SH was reviewed in chart.      Past Medical History:   Diagnosis Date     Crohn's colitis (H) 1999    [facility]. Has been to the [facility] for consultion. Has tried Imuran, Remicade, Humira, Methotrexate     Menorrhagia 6/7/2013     Partial small bowel obstruction (H) 5/17/2013     PONV (postoperative nausea and vomiting)      SBO (small bowel obstruction) (H) 2/4/2013       Past Surgical History:   Procedure Laterality Date     ABDOMEN SURGERY  2013    x 3 stricture removal.      CHOLECYSTECTOMY, LAPOROSCOPIC       COLONOSCOPY  05/13/10     DILATION AND CURETTAGE, HYSTEROSCOPY, ABLATE ENDOMETRIUM NOVASURE, COMBINED  12/11/2012    Procedure: COMBINED DILATION AND CURETTAGE, HYSTEROSCOPY, ABLATE ENDOMETRIUM NOVASURE;  Hysteroscopy, Dilataion and Curettage with Endometrial Ablation,Novasure;  Surgeon: Dana Vance MD;  Location: [facility] OR     INSERT PORT VASCULAR ACCESS  3/6/2014    Procedure: INSERT PORT VASCULAR ACCESS;  Power Port Placement;  Surgeon: Oneal Stephens MD;  Location: [facility] OR     SURGICAL HISTORY OF -   1992    rhinoplasty      TUBAL LIGATION  1997        Family History   Problem Relation Age of Onset     Hypertension Father      C.A.D. Father      Cardiovascular Father      Cerebrovascular Disease Father      Allergies Son      Breast Cancer Mother        Social  History     Socioeconomic History     Marital status:      Spouse name: Not on file     Number of children: Not on file     Years of education: Not on file     Highest education level: Not on file   Occupational History     Not on file   Tobacco Use     Smoking status: Never     Smokeless tobacco: Never   Substance and Sexual Activity     Alcohol use: Yes     Comment: couple drinks couple times a week     Drug use: Yes     Comment: some medicianl marijuana     Sexual activity: Yes     Partners: Male     Birth control/protection: Surgical, Condom   Other Topics Concern     Parent/sibling w/ CABG, MI or angioplasty before 65F 55M? Yes     Comment: father had CAD      Service No     Blood Transfusions No     Caffeine Concern No     Occupational Exposure No     Hobby Hazards No     Sleep Concern No     Stress Concern Yes     Weight Concern No     Special Diet Yes     Back Care No     Exercise Yes     Comment: walking, stretching     Bike Helmet No     Seat Belt Yes     Self-Exams No   Social History Narrative     Not on file     Social Determinants of Health     Financial Resource Strain: Not on file   Food Insecurity: Not on file   Transportation Needs: Not on file   Physical Activity: Not on file   Stress: Not on file   Social Connections: Not on file   Interpersonal Safety: Not on file   Housing Stability: Not on file       Outpatient Encounter Medications as of 1/29/2024   Medication Sig Dispense Refill     ampicillin (PRINCIPEN) 500 MG capsule Take 1 capsule (500 mg) by mouth two times daily 60 capsule 0     spironolactone (ALDACTONE) 25 MG tablet Take 1 tablet (25 mg) by mouth daily 60 tablet 3     Ascorbic Acid (VITAMIN C ER PO) Take 1,000 mg by mouth 3 times daily        Black Cohosh 160 MG CAPS Take 2 capsules by mouth       calcium carbonate (OS-TAYLOR 600 MG [place]) 600 MG tablet Take 1 tablet by mouth 2 times daily (with meals)       cholecalciferol (VITAMIN D3) 23531 UNITS capsule Take 1  capsule by mouth daily       COMPOUNDED NON-CONTROLLED SUBSTANCE (CMPD RX) - PHARMACY TO MIX COMPOUNDED MEDICATION Tretinoin 0.1%/ ascorbic acid 1.67% cream alterante with tretionoin/clinda every other evening (Patient not taking: Reported on 1/29/2024) 30 g 11     COMPOUNDED NON-CONTROLLED SUBSTANCE (CMPD RX) - PHARMACY TO MIX COMPOUNDED MEDICATION Apply daily. Clindamycin 1%/Tretinoin 0.03% cream (Patient not taking: Reported on 1/29/2024) 30 g 11     COMPOUNDED NON-CONTROLLED SUBSTANCE (CMPD RX) - PHARMACY TO MIX COMPOUNDED MEDICATION 5% aldactone cream apply daily (Patient not taking: Reported on 1/29/2024) 30 g 11     COMPOUNDED NON-CONTROLLED SUBSTANCE (CMPD RX) - PHARMACY TO MIX COMPOUNDED MEDICATION Topical aldactone 5% cream apply daily to face (Patient not taking: Reported on 1/29/2024) 30 g 11     cyanocolbalamin (VITAMIN  B-12) 500 MCG tablet Take 500 mcg by mouth 2 times daily       drospirenone-ethinyl estradiol (ANDRE) 3-0.02 MG tablet Take 1 tablet by mouth daily (Patient not taking: Reported on 4/24/2023) 84 tablet 0     fluconazole (DIFLUCAN) 100 MG tablet Take 1 tablet at symptom onset; 1 tablet 3 days later if sx still present (Patient not taking: Reported on 4/24/2023) 3 tablet 7     HYDROcodone-acetaminophen (NORCO) 5-325 MG per tablet Take 1 tablet by mouth every 6 hours as needed for moderate to severe pain 12 tablet 0     Multiple Vitamins-Minerals (ZINC PO) Take 60 mg by mouth daily       Nutritional Supplements (ENSURE PLUS) LIQD Take by mouth 3 times daily (with meals)       Nutritional Supplements (PEPTAMEN 1.5) LIQD Take 4 each by mouth 4 times daily 86420 mL 11     Nutritional Supplements (PEPTAMEN 1.5) LIQD 90 mL every hour for 12 hours or 60 mL every hour for 18 hours. Flush tube with 100 mL every four hours, then 30 mL every four hours once adequate oral hydration has been reached. Eat protein bars and drink protein shakes for added caloric intake. 1000 mL 4     Omega-3 Fatty Acids  (FISH OIL OMEGA-3 PO)        ondansetron (ZOFRAN) 8 MG tablet Take 3 tablets (24 mg) by mouth every 8 hours as needed for nausea Place on all labels pt needs an appointment for further refills please schedule. 36 tablet 5     polyethylene glycol (MIRALAX) powder Take 17 g by mouth daily STIR 1 CAPFUL (17GM) IN 8 OZ OF LIQUID AND DRINK (Patient not taking: Reported on 4/24/2023) 527 g 0     POTASSIUM CHLORIDE ER PO Take 20 mEq by mouth 2 times daily       SUMATRIPTAN SUCCINATE PO Take 50 mg by mouth once Take 50 mg (1 tablet) at onset of headache, may repeat in 2 hours if needed       tretinoin (RETIN-A) 0.05 % external cream Apply topically At Bedtime (Patient not taking: Reported on 4/24/2023) 45 g 11     tretinoin (RETIN-A) 0.1 % external cream Apply topically At Bedtime (Patient not taking: Reported on 4/24/2023) 45 g 11     No facility-administered encounter medications on file as of 1/29/2024.             O:   NAD, WDWN, Alert & Oriented, Mood & Affect wnl, Vitals stable   General appearance normal   Vitals stable   Alert, oriented and in no acute distress     No visible acne on face  Excoriations on face      Eyes: Conjunctivae/lids:Normal     ENT: Lips, buccal mucosa, tongue: normal    MSK:Normal    Cardiovascular: peripheral edema none    Pulm: Breathing Normal    Neuro/Psych: Orientation:Alert and Orientedx3 ; Mood/Affect:normal       A/P:  Acne  I discussed with patient that I do not see any lesions on her face today   She notes this is because she has to pick her skin every day to squeeze out the infection  I offered referral to [employer] because I do not think I am helping her, she declines  I asked her to send photos on [employer] and her reply was that the phone filters will make her skin look better than it is.     I discussed with patient that I am not sure how else to help her and offered a second opinion  She declines  I asked patient to stop picking skin so I could see the lesions, she declines  I  discussed with patient topical aldactone she would like to try this  It was a pleasure speaking to Piper Sotelo today.      Again, thank you for allowing me to participate in the care of your patient.        Sincerely,        Dale Nix MD

## 2024-01-29 NOTE — PATIENT INSTRUCTIONS
Patient Education       Proper skin care from [facility]:    -Eliminate harsh soaps as they strip the natural oils from the skin, often resulting in dry itchy skin ( i.e. Dial, Zest, Amharic Spring)  -Use mild soaps such as Cetaphil or Dove Sensitive Skin in the shower. You do not need to use soap on arms, legs, and trunk every time you shower unless visibly soiled.   -Avoid hot or cold showers.  -After showering, lightly dry off and apply moisturizing within 2-3 minutes. This will help trap moisture in the skin.   -Aggressive use of a moisturizer at least 1-2 times a day to the entire body (including -Vanicream, Cetaphil, Aquaphor or Cerave) and moisturize hands after every washing.  -We recommend using moisturizers that come in a tub that needs to be scooped out, not a pump. This has more of an oil base. It will hold moisture in your skin much better than a water base moisturizer. The above recommended are non-pore clogging.      Wear a sunscreen with at least SPF 30 on your face, ears, neck and V of the chest daily. Wear sunscreen on other areas of the body if those areas are exposed to the sun throughout the day. Sunscreens can contain physical and/or chemical blockers. Physical blockers are less likely to clog pores, these include zinc oxide and titanium dioxide. Reapply every two hour and after swimming.     Sunscreen examples: https://www.ewg.org/sunscreen/    UV radiation  UVA radiation remains constant throughout the day and throughout the year. It is a longer wavelength than UVB and therefore penetrates deeper into the skin leading to immediate and delayed tanning, photoaging, and skin cancer. 70-80% of UVA and UVB radiation occurs between the hours of 10am-2pm.  UVB radiation  UVB radiation causes the most harmful effects and is more significant during the summer months. However, snow and ice can reflect UVB radiation leading to skin damage during the winter months as well. UVB radiation is  responsible for tanning, burning, inflammation, delayed erythema (pinkness), pigmentation (brown spots), and skin cancer.     I recommend self monthly full body exams and yearly full body exams with a dermatology provider. If you develop a new or changing lesion please follow up for examination. Most skin cancers are pink and scaly or pink and pearly. However, we do see blue/brown/black skin cancers.  Consider the ABCDEs of melanoma when giving yourself your monthly full body exam ( don't forget the groin, buttocks, feet, toes, etc). A-asymmetry, B-borders, C-color, D-diameter, E-elevation or evolving. If you see any of these changes please follow up in clinic. If you cannot see your back I recommend purchasing a hand held mirror to use with a larger wall mirror.       Checking for Skin Cancer  You can find cancer early by checking your skin each month. There are 3 kinds of skin cancer. They are melanoma, basal cell carcinoma, and squamous cell carcinoma. Doing monthly skin checks is the best way to find new marks or skin changes. Follow the instructions below for checking your skin.   The ABCDEs of checking moles for melanoma   Check your moles or growths for signs of melanoma using ABCDE:   Asymmetry: the sides of the mole or growth don t match  Border: the edges are ragged, notched, or blurred  Color: the color within the mole or growth varies  Diameter: the mole or growth is larger than 6 mm (size of a pencil eraser)  Evolving: the size, shape, or color of the mole or growth is changing (evolving is not shown in the images below)    Checking for other types of skin cancer  Basal cell carcinoma or squamous cell carcinoma have symptoms such as:     A spot or mole that looks different from all other marks on your skin  Changes in how an area feels, such as itching, tenderness, or pain  Changes in the skin's surface, such as oozing, bleeding, or scaliness  A sore that does not heal  New swelling or redness beyond  the border of a mole    Who s at risk?  Anyone can get skin cancer. But you are at greater risk if you have:   Fair skin, light-colored hair, or light-colored eyes  Many moles or abnormal moles on your skin  A history of sunburns from sunlight or tanning beds  A family history of skin cancer  A history of exposure to radiation or chemicals  A weakened immune system  If you have had skin cancer in the past, you are at risk for recurring skin cancer.   How to check your skin  Do your monthly skin checkups in front of a full-length mirror. Check all parts of your body, including your:   Head (ears, face, neck, and scalp)  Torso (front, back, and sides)  Arms (tops, undersides, upper, and lower armpits)  Hands (palms, backs, and fingers, including under the nails)  Buttocks and genitals  Legs (front, back, and sides)  Feet (tops, soles, toes, including under the nails, and between toes)  If you have a lot of moles, take digital photos of them each month. Make sure to take photos both up close and from a distance. These can help you see if any moles change over time.   Most skin changes are not cancer. But if you see any changes in your skin, call your doctor right away. Only he or she can diagnose a problem. If you have skin cancer, seeing your doctor can be the first step toward getting the treatment that could save your life.   [employer] last reviewed this educational content on 4/1/2019 2000-2020 [employer]. [address] [place] [address], [place] [address]. All rights reserved. This information is not intended as a substitute for professional medical care. Always follow your healthcare professional's instructions.       When should I call my doctor?  If you are worsening or not improving, please, contact us or seek urgent care as noted below.     Who should I call with questions (adults)?  [facility] (adult and pediatric): [phone]  [facility]  [place] (adult): [phone]  [facility] ([place], [place], [place] and [place]) [phone]  For urgent needs outside of business hours call the [facility] at [phone] and ask for the dermatology resident on call to be paged  If this is a medical emergency and you are unable to reach an ER, Call 911      If you need a prescription refill, please contact your pharmacy. Refills are approved or denied by our Physicians during normal business hours, Monday through Fridays  Per office policy, refills will not be granted if you have not been seen within the past year (or sooner depending on your child's condition)

## 2024-02-20 ENCOUNTER — NURSE TRIAGE (OUTPATIENT)
Dept: NURSING | Facility: CLINIC | Age: 54
End: 2024-02-20
Payer: COMMERCIAL

## 2024-02-20 DIAGNOSIS — B37.31 CANDIDIASIS OF VAGINA: Primary | ICD-10-CM

## 2024-02-20 RX ORDER — FLUCONAZOLE 150 MG/1
TABLET ORAL
Qty: 2 TABLET | Refills: 3 | Status: SHIPPED | OUTPATIENT
Start: 2024-02-20

## 2024-02-20 NOTE — TELEPHONE ENCOUNTER
Nurse Triage SBAR    Is this a 2nd Level Triage? YES, LICENSED PRACTITIONER REVIEW IS REQUIRED    Situation: Pt calling re: vaginal symptoms.     Background: Says they started about 2 days ago. Pt states that she has been on antibiotics for acne treatment and thinks it is causing a yeast infection. Says she has tried OTC antifungals in the past and they have not helped her symptoms and just made them worse.     Assessment:   Symptoms include thick, white discharge, vaginal burning and itching and discomfort in the area. Has not looked to see if there are open sores or redness. Pt says she does not notice any odor and has no concern for an STD. Also denies any vaginal bleeding.    Protocol Recommended Disposition:   See in Office Today    Recommendation: Protocol recommends See in Office Today. Pt does not have a [facility] PCP, so unable to send message for 2LT. Will route message to derm provider to see if willing to write for an antifungal, as pt has said he has done this in the past. Pharmacy on file is correct.     Routed to provider    Does the patient meet one of the following criteria for ADS visit consideration? 16+ years old, with an [facility] PCP     TIP  Providers, please consider if this condition is appropriate for management at one of our Acute and Diagnostic Services sites.     If patient is a good candidate, please use dotphrase <dot>triageresponse and select Refer to ADS to document.     Analy Mccauley RN, BSN  [facility]   Triage Nurse Advisor  Reason for Disposition   MILD-MODERATE pain and present > 24 hours  (Exception: Chronic pain.)    Additional Information   Negative: Sounds like a life-threatening emergency to the triager   Negative: Patient sounds very sick or weak to the triager   Negative: Followed a genital area injury (e.g., vagina, vulva)   Negative: Vaginal bleeding is main symptom   Negative: Vaginal discharge is main symptom   Negative: Pain or burning with passing urine (urination) is  main symptom   Negative: Menstrual cramps is main symptom   Negative: Abdomen pain is main symptom   Negative: Pubic lice suspected   Negative: Itching or rash of female genital area (e.g., labia, vagina, vulva)   Negative: Pregnant and labor suspected   Negative: SEVERE pain and not improved 2 hours after pain medicine   Negative: Genital area looks infected (e.g., draining sore, spreading redness) and fever   Negative: Something is hanging out of the vagina and can't easily be pushed back inside    Protocols used: Vaginal Symptoms-A-OH

## 2024-02-20 NOTE — TELEPHONE ENCOUNTER
Called patient to discuss a patient complaint that she left regarding provider and her symptoms.    I Left a VM asking her to call back regarding both issues. Ivelisse Caputo sent in a prescription.  I would like patient to call me back on both issues.    Orin MIRAMONTESRN BSN  Patient Care Supervisor  [facility]- [phone]

## 2024-02-20 NOTE — TELEPHONE ENCOUNTER
I sent a text message to Dr. Nix asking for diflcan. Will wait for his reply- also sending to Ivelisse Caputo, P-AC      Thank you,    Orin MIRAMONTESRN BSN  [facility]- [phone]

## 2024-02-21 ENCOUNTER — TELEPHONE (OUTPATIENT)
Dept: DERMATOLOGY | Facility: CLINIC | Age: 54
End: 2024-02-21
Payer: COMMERCIAL

## 2024-02-21 NOTE — TELEPHONE ENCOUNTER
[facility] Call Center    Phone Message    May a detailed message be left on voicemail: yes     Reason for Call: Other: Pt called regarding a missed call from the clinic yesterday. Pt said it was about her future appt. I did not see a TE regarding this. Please call the pt back. Pt is heading to work now so she won't be able to  the call. You can try tomorrow or pt will call back tomorrow. Thanks      Action Taken: Message routed to:  Other: [facility]    Travel Screening: Not Applicable

## 2024-02-21 NOTE — TELEPHONE ENCOUNTER
See note from 2/20/24 as pt needs to speak to supervisor Orin.   Tatianna COOK RN BSN PHN  [facility]

## 2024-03-11 NOTE — TELEPHONE ENCOUNTER
Called patient and spoke with her about her concerns. Scheduled patient with Ivelisse Caputo on the same day.    Thank you,    Orin MIRAMONTES RN BSN  [facility]- [phone]

## 2024-04-29 ENCOUNTER — OFFICE VISIT (OUTPATIENT)
Dept: DERMATOLOGY | Facility: CLINIC | Age: 54
End: 2024-04-29
Payer: COMMERCIAL

## 2024-04-29 DIAGNOSIS — L70.0 ACNE VULGARIS: Primary | ICD-10-CM

## 2024-04-29 PROCEDURE — 99213 OFFICE O/P EST LOW 20 MIN: CPT | Performed by: PHYSICIAN ASSISTANT

## 2024-04-29 RX ORDER — ESTRADIOL 0.1 MG/D
1 PATCH, EXTENDED RELEASE TRANSDERMAL
COMMUNITY
Start: 2024-04-10

## 2024-04-29 RX ORDER — AZELAIC ACID 0.15 G/G
GEL TOPICAL
Qty: 50 G | Refills: 11 | Status: SHIPPED | OUTPATIENT
Start: 2024-04-29

## 2024-04-29 NOTE — PROGRESS NOTES
HPI:   CC: Piper Sotelo is a pleasant 53 year old female who presents for evaluation and treatment of acne  Condition has been present for: years  Pt complains of pain: Yes     Previous treatments include: numerous topicals, abx and spironolactone. They all either exacerbate her acne or make her more oily or irritated. The the morning she reports she needs to 'squeeze the oil pockets' from her face or else she will make deep painful pimples. She also reports being dry and flaky in the morning.   Areas Involved: face  Current Outpatient Medications   Medication Sig Dispense Refill    ampicillin (PRINCIPEN) 500 MG capsule Take 1 capsule (500 mg) by mouth two times daily 60 capsule 0    azelaic acid (FINACIA) 15 % external gel Apply to face QAM 50 g 11    DARIO 0.1 MG/24HR bi-weekly patch Place 1 patch onto the skin twice a week      HYDROcodone-acetaminophen (NORCO) 5-325 MG per tablet Take 1 tablet by mouth every 6 hours as needed for moderate to severe pain 12 tablet 0    ondansetron (ZOFRAN) 8 MG tablet Take 3 tablets (24 mg) by mouth every 8 hours as needed for nausea Place on all labels pt needs an appointment for further refills please schedule. 36 tablet 5    spironolactone (ALDACTONE) 25 MG tablet Take 1 tablet (25 mg) by mouth daily 60 tablet 3    Ascorbic Acid (VITAMIN C ER PO) Take 1,000 mg by mouth 3 times daily       Black Cohosh 160 MG CAPS Take 2 capsules by mouth      calcium carbonate (OS-TAYLOR 600 MG [place]. [place]) 600 MG tablet Take 1 tablet by mouth 2 times daily (with meals)      cholecalciferol (VITAMIN D3) 16555 UNITS capsule Take 1 capsule by mouth daily      COMPOUNDED NON-CONTROLLED SUBSTANCE (CMPD RX) - PHARMACY TO MIX COMPOUNDED MEDICATION Aldactone 5% cream 30 g 11    COMPOUNDED NON-CONTROLLED SUBSTANCE (CMPD RX) - PHARMACY TO MIX COMPOUNDED MEDICATION Tretinoin 0.1%/ ascorbic acid 1.67% cream alterante with tretionoin/clinda every other evening (Patient not taking: Reported on 1/29/2024) 30 g  11    COMPOUNDED NON-CONTROLLED SUBSTANCE (CMPD RX) - PHARMACY TO MIX COMPOUNDED MEDICATION Apply daily. Clindamycin 1%/Tretinoin 0.03% cream (Patient not taking: Reported on 1/29/2024) 30 g 11    COMPOUNDED NON-CONTROLLED SUBSTANCE (CMPD RX) - PHARMACY TO MIX COMPOUNDED MEDICATION 5% aldactone cream apply daily (Patient not taking: Reported on 1/29/2024) 30 g 11    COMPOUNDED NON-CONTROLLED SUBSTANCE (CMPD RX) - PHARMACY TO MIX COMPOUNDED MEDICATION Topical aldactone 5% cream apply daily to face (Patient not taking: Reported on 1/29/2024) 30 g 11    cyanocolbalamin (VITAMIN  B-12) 500 MCG tablet Take 500 mcg by mouth 2 times daily      drospirenone-ethinyl estradiol (ANDRE) 3-0.02 MG tablet Take 1 tablet by mouth daily (Patient not taking: Reported on 4/24/2023) 84 tablet 0    fluconazole (DIFLUCAN) 100 MG tablet Take 1 tablet at symptom onset; 1 tablet 3 days later if sx still present (Patient not taking: Reported on 4/24/2023) 3 tablet 7    fluconazole (DIFLUCAN) 150 MG tablet Take 1 tablet at symptom onset, 1 tablet 3 days later if symptoms still present (Patient not taking: Reported on 4/29/2024) 2 tablet 3    Multiple Vitamins-Minerals (ZINC PO) Take 60 mg by mouth daily      Nutritional Supplements (ENSURE PLUS) LIQD Take by mouth 3 times daily (with meals)      Nutritional Supplements (PEPTAMEN 1.5) LIQD Take 4 each by mouth 4 times daily (Patient not taking: Reported on 4/29/2024) 25197 mL 11    Nutritional Supplements (PEPTAMEN 1.5) LIQD 90 mL every hour for 12 hours or 60 mL every hour for 18 hours. Flush tube with 100 mL every four hours, then 30 mL every four hours once adequate oral hydration has been reached. Eat protein bars and drink protein shakes for added caloric intake. (Patient not taking: Reported on 4/29/2024) 1000 mL 4    Omega-3 Fatty Acids (FISH OIL OMEGA-3 PO)       polyethylene glycol (MIRALAX) powder Take 17 g by mouth daily STIR 1 CAPFUL (17GM) IN 8 OZ OF LIQUID AND DRINK (Patient not  "taking: Reported on 4/24/2023) 527 g 0    POTASSIUM CHLORIDE ER PO Take 20 mEq by mouth 2 times daily      SUMATRIPTAN SUCCINATE PO Take 50 mg by mouth once Take 50 mg (1 tablet) at onset of headache, may repeat in 2 hours if needed      tretinoin (RETIN-A) 0.05 % external cream Apply topically At Bedtime (Patient not taking: Reported on 4/24/2023) 45 g 11    tretinoin (RETIN-A) 0.1 % external cream Apply topically At Bedtime (Patient not taking: Reported on 4/24/2023) 45 g 11     Allergies   Allergen Reactions    Adalimumab Swelling     Facial swelling and just didn't feel good    Aldactone [Spironolactone] Other (See Comments)     Chest pain, GI upset    Chlorhexidine Gluconate     Diazepam      Suicidal thoughts, and major depression  Pt tolerates lorazepam    Erythromycin Nausea     She doesn't like the way she feels when she is on it.    Flagyl [Metronidazole Hcl] Other (See Comments)     headache    Morphine [Fumaric Acid] Other (See Comments)     Pt states \"doesn't work and makes her miserable\"    Oxycodone      Pt reports \"it doesn't work and I just don't like the way I feel when I take it\"    Percocet [Oxycodone-Acetaminophen] Nausea     Pt doesn't like it- causes nausea    Vancomycin     Droperidol Anxiety    Prochlorperazine Anxiety    Risperidone Anxiety    Risperidone Anxiety     Other reaction(s): Nausea Only     Denies any other skin complaints, in general feels well: Yes  Review of symptoms otherwise negative:Yes    PHYSICAL EXAM:   A&Ox3: Yes   Well developed/well nourished female Yes   Mood appropriate Yes      There were no vitals taken for this visit.  Type 2 skin. Mood appropriate  Alert and Oriented X 3. Well developed, well nourished in no distress.  General appearance: Normal  Head including face: Normal  Eyes: conjunctiva and lids: Normal  Mouth: Lips, teeth, gums: Normal  Neck: Normal  Skin: Scalp and body hair: See below     Comedones Papules/Pustules Cysts Staining Scarring   Face/Neck 1+ " 0 0 0 0   Chest 0 0 0 0 0   Back 0 0 0 0 0     Telangiectasias: No Fixed Erythema: No Exoriations: No   Other Physical Exam Findings:  Few excoriations on the face  ASSESSMENT & PLAN:     Acne Vulgaris - advised on diagnosis and treatment options. Discussed use of topical medications and antibiotics.     --Start azelaic acid QAM  --Start hyaluronic acid QAM  --Continue niacinamide topically QHS      Pt advised on use and risks including photosensitivity, allergic reactions, GI upset, headaches, nausea, erythema, scaling, vertigo, asthralgias, blood clots:Yes    Follow-up: 4-6 months  CC:   Scribed By: Ivelisse Caputo, MS, PA-C

## 2024-04-29 NOTE — LETTER
4/29/2024         RE: Piper Sotelo  [address]  [place] [address]        Dear Colleague,    Thank you for referring your patient, Piper Sotelo, to the [facility]. Please see a copy of my visit note below.    HPI:   CC: Piper Sotelo is a pleasant 53 year old female who presents for evaluation and treatment of acne  Condition has been present for: years  Pt complains of pain: Yes     Previous treatments include: numerous topicals, abx and spironolactone. They all either exacerbate her acne or make her more oily or irritated. The the morning she reports she needs to 'squeeze the oil pockets' from her face or else she will make deep painful pimples. She also reports being dry and flaky in the morning.   Areas Involved: face  Current Outpatient Medications   Medication Sig Dispense Refill     ampicillin (PRINCIPEN) 500 MG capsule Take 1 capsule (500 mg) by mouth two times daily 60 capsule 0     azelaic acid (FINACIA) 15 % external gel Apply to face QAM 50 g 11     DARIO 0.1 MG/24HR bi-weekly patch Place 1 patch onto the skin twice a week       HYDROcodone-acetaminophen (NORCO) 5-325 MG per tablet Take 1 tablet by mouth every 6 hours as needed for moderate to severe pain 12 tablet 0     ondansetron (ZOFRAN) 8 MG tablet Take 3 tablets (24 mg) by mouth every 8 hours as needed for nausea Place on all labels pt needs an appointment for further refills please schedule. 36 tablet 5     spironolactone (ALDACTONE) 25 MG tablet Take 1 tablet (25 mg) by mouth daily 60 tablet 3     Ascorbic Acid (VITAMIN C ER PO) Take 1,000 mg by mouth 3 times daily        Black Cohosh 160 MG CAPS Take 2 capsules by mouth       calcium carbonate (OS-TAYLOR 600 MG [place]. [place]) 600 MG tablet Take 1 tablet by mouth 2 times daily (with meals)       cholecalciferol (VITAMIN D3) 54259 UNITS capsule Take 1 capsule by mouth daily       COMPOUNDED NON-CONTROLLED SUBSTANCE (CMPD RX) - PHARMACY TO MIX COMPOUNDED MEDICATION  Aldactone 5% cream 30 g 11     COMPOUNDED NON-CONTROLLED SUBSTANCE (CMPD RX) - PHARMACY TO MIX COMPOUNDED MEDICATION Tretinoin 0.1%/ ascorbic acid 1.67% cream alterante with tretionoin/clinda every other evening (Patient not taking: Reported on 1/29/2024) 30 g 11     COMPOUNDED NON-CONTROLLED SUBSTANCE (CMPD RX) - PHARMACY TO MIX COMPOUNDED MEDICATION Apply daily. Clindamycin 1%/Tretinoin 0.03% cream (Patient not taking: Reported on 1/29/2024) 30 g 11     COMPOUNDED NON-CONTROLLED SUBSTANCE (CMPD RX) - PHARMACY TO MIX COMPOUNDED MEDICATION 5% aldactone cream apply daily (Patient not taking: Reported on 1/29/2024) 30 g 11     COMPOUNDED NON-CONTROLLED SUBSTANCE (CMPD RX) - PHARMACY TO MIX COMPOUNDED MEDICATION Topical aldactone 5% cream apply daily to face (Patient not taking: Reported on 1/29/2024) 30 g 11     cyanocolbalamin (VITAMIN  B-12) 500 MCG tablet Take 500 mcg by mouth 2 times daily       drospirenone-ethinyl estradiol (ANDRE) 3-0.02 MG tablet Take 1 tablet by mouth daily (Patient not taking: Reported on 4/24/2023) 84 tablet 0     fluconazole (DIFLUCAN) 100 MG tablet Take 1 tablet at symptom onset; 1 tablet 3 days later if sx still present (Patient not taking: Reported on 4/24/2023) 3 tablet 7     fluconazole (DIFLUCAN) 150 MG tablet Take 1 tablet at symptom onset, 1 tablet 3 days later if symptoms still present (Patient not taking: Reported on 4/29/2024) 2 tablet 3     Multiple Vitamins-Minerals (ZINC PO) Take 60 mg by mouth daily       Nutritional Supplements (ENSURE PLUS) LIQD Take by mouth 3 times daily (with meals)       Nutritional Supplements (PEPTAMEN 1.5) LIQD Take 4 each by mouth 4 times daily (Patient not taking: Reported on 4/29/2024) 35892 mL 11     Nutritional Supplements (PEPTAMEN 1.5) LIQD 90 mL every hour for 12 hours or 60 mL every hour for 18 hours. Flush tube with 100 mL every four hours, then 30 mL every four hours once adequate oral hydration has been reached. Eat protein bars and drink  "protein shakes for added caloric intake. (Patient not taking: Reported on 4/29/2024) 1000 mL 4     Omega-3 Fatty Acids (FISH OIL OMEGA-3 PO)        polyethylene glycol (MIRALAX) powder Take 17 g by mouth daily STIR 1 CAPFUL (17GM) IN 8 OZ OF LIQUID AND DRINK (Patient not taking: Reported on 4/24/2023) 527 g 0     POTASSIUM CHLORIDE ER PO Take 20 mEq by mouth 2 times daily       SUMATRIPTAN SUCCINATE PO Take 50 mg by mouth once Take 50 mg (1 tablet) at onset of headache, may repeat in 2 hours if needed       tretinoin (RETIN-A) 0.05 % external cream Apply topically At Bedtime (Patient not taking: Reported on 4/24/2023) 45 g 11     tretinoin (RETIN-A) 0.1 % external cream Apply topically At Bedtime (Patient not taking: Reported on 4/24/2023) 45 g 11     Allergies   Allergen Reactions     Adalimumab Swelling     Facial swelling and just didn't feel good     Aldactone [Spironolactone] Other (See Comments)     Chest pain, GI upset     Chlorhexidine Gluconate      Diazepam      Suicidal thoughts, and major depression  Pt tolerates lorazepam     Erythromycin Nausea     She doesn't like the way she feels when she is on it.     Flagyl [Metronidazole Hcl] Other (See Comments)     headache     Morphine [Fumaric Acid] Other (See Comments)     Pt states \"doesn't work and makes her miserable\"     Oxycodone      Pt reports \"it doesn't work and I just don't like the way I feel when I take it\"     Percocet [Oxycodone-Acetaminophen] Nausea     Pt doesn't like it- causes nausea     Vancomycin      Droperidol Anxiety     Prochlorperazine Anxiety     Risperidone Anxiety     Risperidone Anxiety     Other reaction(s): Nausea Only     Denies any other skin complaints, in general feels well: Yes  Review of symptoms otherwise negative:Yes    PHYSICAL EXAM:   A&Ox3: Yes   Well developed/well nourished female Yes   Mood appropriate Yes      There were no vitals taken for this visit.  Type 2 skin. Mood appropriate  Alert and Oriented X 3. Well " developed, well nourished in no distress.  General appearance: Normal  Head including face: Normal  Eyes: conjunctiva and lids: Normal  Mouth: Lips, teeth, gums: Normal  Neck: Normal  Skin: Scalp and body hair: See below     Comedones Papules/Pustules Cysts Staining Scarring   Face/Neck 1+ 0 0 0 0   Chest 0 0 0 0 0   Back 0 0 0 0 0     Telangiectasias: No Fixed Erythema: No Exoriations: No   Other Physical Exam Findings:  Few excoriations on the face  ASSESSMENT & PLAN:     Acne Vulgaris - advised on diagnosis and treatment options. Discussed use of topical medications and antibiotics.     --Start azelaic acid QAM  --Start hyaluronic acid QAM  --Continue niacinamide topically QHS      Pt advised on use and risks including photosensitivity, allergic reactions, GI upset, headaches, nausea, erythema, scaling, vertigo, asthralgias, blood clots:Yes    Follow-up: 4-6 months  CC:   Scribed By: Ivelisse Caputo MS, PACHARLI        Again, thank you for allowing me to participate in the care of your patient.        Sincerely,        Ivelisse Caputo PA-C

## 2024-04-29 NOTE — PATIENT INSTRUCTIONS
Directions for acne:    AM    Wash  Apply azelaic acid to your entire face  Hyaluronic acid over (try The Ordinary)  Moisturizer with SPF over that    PM  1. Wash  2. Niacinamide topically - try The Ordinary - you can get this at [employer] or [employer]   3. Moisturizer over if needed      Follow-up in 4-6 months         Patient Education       Proper skin care from [facility]:    -Eliminate harsh soaps as they strip the natural oils from the skin, often resulting in dry itchy skin ( i.e. Dial, Zest, [place])  -Use mild soaps such as Cetaphil or Dove Sensitive Skin in the shower. You do not need to use soap on arms, legs, and trunk every time you shower unless visibly soiled.   -Avoid hot or cold showers.  -After showering, lightly dry off and apply moisturizing within 2-3 minutes. This will help trap moisture in the skin.   -Aggressive use of a moisturizer at least 1-2 times a day to the entire body (including -Vanicream, Cetaphil, Aquaphor or Cerave) and moisturize hands after every washing.  -We recommend using moisturizers that come in a tub that needs to be scooped out, not a pump. This has more of an oil base. It will hold moisture in your skin much better than a water base moisturizer. The above recommended are non-pore clogging.      Wear a sunscreen with at least SPF 30 on your face, ears, neck and V of the chest daily. Wear sunscreen on other areas of the body if those areas are exposed to the sun throughout the day. Sunscreens can contain physical and/or chemical blockers. Physical blockers are less likely to clog pores, these include zinc oxide and titanium dioxide. Reapply every two hour and after swimming.     Sunscreen examples: https://www.ewg.org/sunscreen/    UV radiation  UVA radiation remains constant throughout the day and throughout the year. It is a longer wavelength than UVB and therefore penetrates deeper into the skin leading to immediate and delayed tanning, photoaging, and skin  cancer. 70-80% of UVA and UVB radiation occurs between the hours of 10am-2pm.  UVB radiation  UVB radiation causes the most harmful effects and is more significant during the summer months. However, snow and ice can reflect UVB radiation leading to skin damage during the winter months as well. UVB radiation is responsible for tanning, burning, inflammation, delayed erythema (pinkness), pigmentation (brown spots), and skin cancer.     I recommend self monthly full body exams and yearly full body exams with a dermatology provider. If you develop a new or changing lesion please follow up for examination. Most skin cancers are pink and scaly or pink and pearly. However, we do see blue/brown/black skin cancers.  Consider the ABCDEs of melanoma when giving yourself your monthly full body exam ( don't forget the groin, buttocks, feet, toes, etc). A-asymmetry, B-borders, C-color, D-diameter, E-elevation or evolving. If you see any of these changes please follow up in clinic. If you cannot see your back I recommend purchasing a hand held mirror to use with a larger wall mirror.       Checking for Skin Cancer  You can find cancer early by checking your skin each month. There are 3 kinds of skin cancer. They are melanoma, basal cell carcinoma, and squamous cell carcinoma. Doing monthly skin checks is the best way to find new marks or skin changes. Follow the instructions below for checking your skin.   The ABCDEs of checking moles for melanoma   Check your moles or growths for signs of melanoma using ABCDE:   Asymmetry: the sides of the mole or growth don t match  Border: the edges are ragged, notched, or blurred  Color: the color within the mole or growth varies  Diameter: the mole or growth is larger than 6 mm (size of a pencil eraser)  Evolving: the size, shape, or color of the mole or growth is changing (evolving is not shown in the images below)    Checking for other types of skin cancer  Basal cell carcinoma or squamous  cell carcinoma have symptoms such as:     A spot or mole that looks different from all other marks on your skin  Changes in how an area feels, such as itching, tenderness, or pain  Changes in the skin's surface, such as oozing, bleeding, or scaliness  A sore that does not heal  New swelling or redness beyond the border of a mole    Who s at risk?  Anyone can get skin cancer. But you are at greater risk if you have:   Fair skin, light-colored hair, or light-colored eyes  Many moles or abnormal moles on your skin  A history of sunburns from sunlight or tanning beds  A family history of skin cancer  A history of exposure to radiation or chemicals  A weakened immune system  If you have had skin cancer in the past, you are at risk for recurring skin cancer.   How to check your skin  Do your monthly skin checkups in front of a full-length mirror. Check all parts of your body, including your:   Head (ears, face, neck, and scalp)  Torso (front, back, and sides)  Arms (tops, undersides, upper, and lower armpits)  Hands (palms, backs, and fingers, including under the nails)  Buttocks and genitals  Legs (front, back, and sides)  Feet (tops, soles, toes, including under the nails, and between toes)  If you have a lot of moles, take digital photos of them each month. Make sure to take photos both up close and from a distance. These can help you see if any moles change over time.   Most skin changes are not cancer. But if you see any changes in your skin, call your doctor right away. Only he or she can diagnose a problem. If you have skin cancer, seeing your doctor can be the first step toward getting the treatment that could save your life.   [employer]) last reviewed this educational content on 4/1/2019 2000-2020 [employer]. [address] [facility] [facility], [place] [address]. All rights reserved. This information is not intended as a substitute for professional medical care. Always follow your healthcare professional's  instructions.       When should I call my doctor?  If you are worsening or not improving, please, contact us or seek urgent care as noted below.     Who should I call with questions (adults)?  [facility] (adult and pediatric): [phone]  [facility] (adult): [phone]  [facility] ([facility] and [place]) [phone]  For urgent needs outside of business hours call the [facility] at [phone] and ask for the dermatology resident on call to be paged  If this is a medical emergency and you are unable to reach an ER, Call [phone]      If you need a prescription refill, please contact your pharmacy. Refills are approved or denied by our Physicians during normal business hours, Monday through Fridays  Per office policy, refills will not be granted if you have not been seen within the past year (or sooner depending on your child's condition)

## 2024-05-13 DIAGNOSIS — L70.0 ACNE VULGARIS: ICD-10-CM

## 2024-05-13 RX ORDER — AMPICILLIN TRIHYDRATE 500 MG
500 CAPSULE ORAL
Qty: 60 CAPSULE | Refills: 0 | Status: SHIPPED | OUTPATIENT
Start: 2024-05-13

## 2024-05-13 NOTE — TELEPHONE ENCOUNTER
Left detailed voicemail that this is only for flares and to continue with the topicals.  Thank you,    Orin MIRAMONTESRN BSN  [facility]- [phone]

## 2024-05-13 NOTE — TELEPHONE ENCOUNTER
Yes just as needed. If she is clear then hold off on any oral meds and continue the topicals daily. I will send in 1 month in case she flares.

## 2024-05-13 NOTE — TELEPHONE ENCOUNTER
Called patient- she is not having a flare- she thought she was supposed to be on this long term. I advised this is usually only for flares- she states she is clear now    Thank you,    Orin MIRAMONTES RN BSN  [facility]- [phone]

## 2024-05-13 NOTE — TELEPHONE ENCOUNTER
Requested Prescriptions   Pending Prescriptions Disp Refills    ampicillin (PRINCIPEN) 500 MG capsule 60 capsule 0     Sig: Take 1 capsule (500 mg) by mouth two times daily       There is no refill protocol information for this order        Last Written Prescription Date:  1/29/24  Last Fill Quantity: 60,  # refills: 0   Last office visit: 4/29/2024 ; last virtual visit: 10/16/2023 with prescribing provider:     Future Office Visit:

## 2024-10-07 ENCOUNTER — TELEPHONE (OUTPATIENT)
Dept: DERMATOLOGY | Facility: CLINIC | Age: 54
End: 2024-10-07
Payer: COMMERCIAL

## 2024-10-07 NOTE — TELEPHONE ENCOUNTER
[facility] Call Center    Phone Message    May a detailed message be left on voicemail: yes     Reason for Call: Other: Patient is needing to get in and see Ivelisse. Her next available is April. She is suppose to have a follow up every 3 months and she didn't get a reminder for Aug. Appointment.  So we set her up for appt. Reminders. Patient is okay with a televisit. Please call patient back to discuss. Thanks.     Action Taken: te sent     Travel Screening: Not Applicable     Date of Service:

## 2024-10-07 NOTE — TELEPHONE ENCOUNTER
Patient Contact    Attempt # 1    Was call answered?  No    Left message to call clinic to try to get her an appt.      Kristin Kaur MA  [facility]   [phone]

## 2024-10-07 NOTE — TELEPHONE ENCOUNTER
Does she need to be seen every 3 months?    Looks like last dictation from April said to follow up in 4 to 6 months?    She was scheduled 8-26-24 but did not attend that appt.    Rowan Lutz RN

## 2024-10-08 NOTE — TELEPHONE ENCOUNTER
"Spoke to patient and advised of Provider's message below. She was added to the wait list as well. I did advise if she can sign up for My chart, she will get automated messages when there is a cancellation available.     She will schedule and appt now and \"will try again to sign up for My chart..\"    Rowan Lutz RN        "

## 2024-10-21 NOTE — TELEPHONE ENCOUNTER
[facility] Call Center    Phone Message    May a detailed message be left on voicemail: yes     Reason for Call: Other: Pt said she is struggling to get it to work to sign up for MyChart - scheduled Pt first opening with either Ivelisse or Teetee - Pt requesting to be put on Calling Waiting List if you can please for ONLY Ivelisse or Teetee - she will see either one of them - but will not see Dr Nix again she said - if you can get her in earlier with Ivelisse or Teetee please call her - Thank you - Scheduled first opening and wait listed with Teetee and Ivelisse - Thanks!     Action Taken: Message routed to:  Other: [facility] [facility]    Travel Screening: Not Applicable     Date of Service: 04/21/25

## 2024-10-21 NOTE — TELEPHONE ENCOUNTER
Patient Contact    Attempt # 1    Was call answered?  No.    Called patient. No answer. Left message to call back. Clinic number was provided.     Rowan Lutz RN    [facility]   [phone]

## 2024-10-24 NOTE — TELEPHONE ENCOUNTER
Patient Contact    Attempt # 2    Was call answered?  No.    Called patient. No answer. Left message to call back. Clinic number was provided.     Rowan Lutz RN    [facility]   [phone]

## 2024-10-25 NOTE — TELEPHONE ENCOUNTER
Patient Contact    Attempt # 3    Was call answered?  No    Called patient. No answer. Left message to call back. Clinic number was provided.     Maryam Landis LPN     [facility]   [phone]

## 2025-04-03 ENCOUNTER — OFFICE VISIT (OUTPATIENT)
Dept: DERMATOLOGY | Facility: CLINIC | Age: 55
End: 2025-04-03
Payer: COMMERCIAL

## 2025-04-03 DIAGNOSIS — L73.9 FOLLICULITIS: Primary | ICD-10-CM

## 2025-04-03 DIAGNOSIS — L70.0 ACNE VULGARIS: ICD-10-CM

## 2025-04-03 RX ORDER — CLINDAMYCIN PHOSPHATE 10 UG/ML
LOTION TOPICAL
Qty: 60 ML | Refills: 3 | Status: SHIPPED | OUTPATIENT
Start: 2025-04-03

## 2025-04-03 RX ORDER — AZELAIC ACID 0.15 G/G
GEL TOPICAL
Qty: 50 G | Refills: 11 | Status: SHIPPED | OUTPATIENT
Start: 2025-04-03

## 2025-04-03 NOTE — PATIENT INSTRUCTIONS
Try to not pluck and instead shave with the grain of the hair (or be au naturel!)    When you are inflamed, wash with OTC Hibiclens in the shower  Apply clindamycin lotion daily after the shower

## 2025-04-03 NOTE — PROGRESS NOTES
HPI:   CC: Piper Sotelo is a pleasant 54 year old female who presents for recheck of acne. She reports that overall things have been ok. She is using hyaluronic acid, kojic acid and azelaic acid.   Additionally she notes persistent ingrown hairs and infections along her upper inner thighs and chin. The spots look good today but they will come and go.   Condition has been present for: years  Pt complains of pain: Yes     Previous treatments include: numerous topicals, abx and spironolactone. They all either exacerbate her acne or make her more oily or irritated. The the morning she reports she needs to 'squeeze the oil pockets' from her face or else she will make deep painful pimples. She also reports being dry and flaky in the morning.   Areas Involved: face  Current Outpatient Medications   Medication Sig Dispense Refill    azelaic acid (FINACIA) 15 % external gel Apply to face QAM 50 g 11    clindamycin (CLEOCIN T) 1 % external lotion Apply to AA daily PRN 60 mL 3    ampicillin (PRINCIPEN) 500 MG capsule Take 1 capsule (500 mg) by mouth two times daily 60 capsule 0    Ascorbic Acid (VITAMIN C ER PO) Take 1,000 mg by mouth 3 times daily       Black Cohosh 160 MG CAPS Take 2 capsules by mouth      calcium carbonate (OS-TAYLOR 600 MG [place]. [place]) 600 MG tablet Take 1 tablet by mouth 2 times daily (with meals)      cholecalciferol (VITAMIN D3) 81090 UNITS capsule Take 1 capsule by mouth daily      COMPOUNDED NON-CONTROLLED SUBSTANCE (CMPD RX) - PHARMACY TO MIX COMPOUNDED MEDICATION Aldactone 5% cream 30 g 11    COMPOUNDED NON-CONTROLLED SUBSTANCE (CMPD RX) - PHARMACY TO MIX COMPOUNDED MEDICATION Tretinoin 0.1%/ ascorbic acid 1.67% cream alterante with tretionoin/clinda every other evening (Patient not taking: Reported on 1/29/2024) 30 g 11    COMPOUNDED NON-CONTROLLED SUBSTANCE (CMPD RX) - PHARMACY TO MIX COMPOUNDED MEDICATION Apply daily. Clindamycin 1%/Tretinoin 0.03% cream (Patient not taking: Reported on 1/29/2024)  30 g 11    COMPOUNDED NON-CONTROLLED SUBSTANCE (CMPD RX) - PHARMACY TO MIX COMPOUNDED MEDICATION 5% aldactone cream apply daily (Patient not taking: Reported on 1/29/2024) 30 g 11    COMPOUNDED NON-CONTROLLED SUBSTANCE (CMPD RX) - PHARMACY TO MIX COMPOUNDED MEDICATION Topical aldactone 5% cream apply daily to face (Patient not taking: Reported on 1/29/2024) 30 g 11    cyanocolbalamin (VITAMIN  B-12) 500 MCG tablet Take 500 mcg by mouth 2 times daily      DARIO 0.1 MG/24HR bi-weekly patch Place 1 patch onto the skin twice a week      drospirenone-ethinyl estradiol (ANDRE) 3-0.02 MG tablet Take 1 tablet by mouth daily (Patient not taking: Reported on 4/24/2023) 84 tablet 0    fluconazole (DIFLUCAN) 100 MG tablet Take 1 tablet at symptom onset; 1 tablet 3 days later if sx still present (Patient not taking: Reported on 4/24/2023) 3 tablet 7    fluconazole (DIFLUCAN) 150 MG tablet Take 1 tablet at symptom onset, 1 tablet 3 days later if symptoms still present (Patient not taking: Reported on 4/29/2024) 2 tablet 3    HYDROcodone-acetaminophen (NORCO) 5-325 MG per tablet Take 1 tablet by mouth every 6 hours as needed for moderate to severe pain 12 tablet 0    Multiple Vitamins-Minerals (ZINC PO) Take 60 mg by mouth daily      Nutritional Supplements (ENSURE PLUS) LIQD Take by mouth 3 times daily (with meals)      Nutritional Supplements (PEPTAMEN 1.5) LIQD Take 4 each by mouth 4 times daily (Patient not taking: Reported on 4/29/2024) 70825 mL 11    Nutritional Supplements (PEPTAMEN 1.5) LIQD 90 mL every hour for 12 hours or 60 mL every hour for 18 hours. Flush tube with 100 mL every four hours, then 30 mL every four hours once adequate oral hydration has been reached. Eat protein bars and drink protein shakes for added caloric intake. (Patient not taking: Reported on 4/29/2024) 1000 mL 4    Omega-3 Fatty Acids (FISH OIL OMEGA-3 PO)       ondansetron (ZOFRAN) 8 MG tablet Take 3 tablets (24 mg) by mouth every 8 hours as needed  "for nausea Place on all labels pt needs an appointment for further refills please schedule. 36 tablet 5    polyethylene glycol (MIRALAX) powder Take 17 g by mouth daily STIR 1 CAPFUL (17GM) IN 8 OZ OF LIQUID AND DRINK (Patient not taking: Reported on 4/24/2023) 527 g 0    POTASSIUM CHLORIDE ER PO Take 20 mEq by mouth 2 times daily      spironolactone (ALDACTONE) 25 MG tablet Take 1 tablet (25 mg) by mouth daily 60 tablet 3    SUMATRIPTAN SUCCINATE PO Take 50 mg by mouth once Take 50 mg (1 tablet) at onset of headache, may repeat in 2 hours if needed      tretinoin (RETIN-A) 0.05 % external cream Apply topically At Bedtime (Patient not taking: Reported on 4/24/2023) 45 g 11    tretinoin (RETIN-A) 0.1 % external cream Apply topically At Bedtime (Patient not taking: Reported on 4/24/2023) 45 g 11     Allergies   Allergen Reactions    Adalimumab Swelling     Facial swelling and just didn't feel good    Aldactone [Spironolactone] Other (See Comments)     Chest pain, GI upset    Chlorhexidine Gluconate     Diazepam      Suicidal thoughts, and major depression  Pt tolerates lorazepam    Erythromycin Nausea     She doesn't like the way she feels when she is on it.    Flagyl [Metronidazole Hcl] Other (See Comments)     headache    Morphine [Fumaric Acid] Other (See Comments)     Pt states \"doesn't work and makes her miserable\"    Oxycodone      Pt reports \"it doesn't work and I just don't like the way I feel when I take it\"    Percocet [Oxycodone-Acetaminophen] Nausea     Pt doesn't like it- causes nausea    Vancomycin     Droperidol Anxiety    Prochlorperazine Anxiety    Risperidone Anxiety    Risperidone Anxiety     Other reaction(s): Nausea Only     Denies any other skin complaints, in general feels well: Yes  Review of symptoms otherwise negative:Yes    PHYSICAL EXAM:   A&Ox3: Yes   Well developed/well nourished female Yes   Mood appropriate Yes      There were no vitals taken for this visit.  Type 2 skin. Mood " appropriate  Alert and Oriented X 3. Well developed, well nourished in no distress.  General appearance: Normal  Head including face: Normal  Eyes: conjunctiva and lids: Normal  Mouth: Lips, teeth, gums: Normal  Neck: Normal  Skin: Scalp and body hair: See below     Comedones Papules/Pustules Cysts Staining Scarring   Face/Neck 1+ 0 0 0 0   Chest 0 0 0 0 0   Back 0 0 0 0 0     Telangiectasias: No Fixed Erythema: No Exoriations: No   Other Physical Exam Findings:  PIH and a few papules on bilateral upper inner thighs  ASSESSMENT & PLAN:     Acne Vulgaris - doing great. advised on diagnosis and treatment options. Discussed use of topical medications and antibiotics.     --Continue azelaic acid QAM  --Continue hyaluronic acid QAM  --Continue kojic acid    2. Folliculitis, ingrown hairs  --Start shaving with the grain of the hair; try to not pluck  --Start Hibiclens daily in the shower as needed  --Start clindamycin lotion daily PRN flares      Pt advised on use and risks including photosensitivity, allergic reactions, GI upset, headaches, nausea, erythema, scaling, vertigo, asthralgias, blood clots:Yes    Follow-up: yearly/PRN sooner  Scribed By: Ivelisse Caputo, MS, PACHARLI

## 2025-04-03 NOTE — LETTER
4/3/2025      Piper Sotelo  [address]  [place] [address]      Dear Colleague,    Thank you for referring your patient, Piper Sotelo, to the [facility]. Please see a copy of my visit note below.    HPI:   CC: Piper Sotelo is a pleasant 54 year old female who presents for recheck of acne. She reports that overall things have been ok. She is using hyaluronic acid, kojic acid and azelaic acid.   Additionally she notes persistent ingrown hairs and infections along her upper inner thighs and chin. The spots look good today but they will come and go.   Condition has been present for: years  Pt complains of pain: Yes     Previous treatments include: numerous topicals, abx and spironolactone. They all either exacerbate her acne or make her more oily or irritated. The the morning she reports she needs to 'squeeze the oil pockets' from her face or else she will make deep painful pimples. She also reports being dry and flaky in the morning.   Areas Involved: face  Current Outpatient Medications   Medication Sig Dispense Refill     azelaic acid (FINACIA) 15 % external gel Apply to face QAM 50 g 11     clindamycin (CLEOCIN T) 1 % external lotion Apply to AA daily PRN 60 mL 3     ampicillin (PRINCIPEN) 500 MG capsule Take 1 capsule (500 mg) by mouth two times daily 60 capsule 0     Ascorbic Acid (VITAMIN C ER PO) Take 1,000 mg by mouth 3 times daily        Black Cohosh 160 MG CAPS Take 2 capsules by mouth       calcium carbonate (OS-TAYLOR 600 MG [place]. [place]) 600 MG tablet Take 1 tablet by mouth 2 times daily (with meals)       cholecalciferol (VITAMIN D3) 43606 UNITS capsule Take 1 capsule by mouth daily       COMPOUNDED NON-CONTROLLED SUBSTANCE (CMPD RX) - PHARMACY TO MIX COMPOUNDED MEDICATION Aldactone 5% cream 30 g 11     COMPOUNDED NON-CONTROLLED SUBSTANCE (CMPD RX) - PHARMACY TO MIX COMPOUNDED MEDICATION Tretinoin 0.1%/ ascorbic acid 1.67% cream alterante with tretionoin/clinda every other  evening (Patient not taking: Reported on 1/29/2024) 30 g 11     COMPOUNDED NON-CONTROLLED SUBSTANCE (CMPD RX) - PHARMACY TO MIX COMPOUNDED MEDICATION Apply daily. Clindamycin 1%/Tretinoin 0.03% cream (Patient not taking: Reported on 1/29/2024) 30 g 11     COMPOUNDED NON-CONTROLLED SUBSTANCE (CMPD RX) - PHARMACY TO MIX COMPOUNDED MEDICATION 5% aldactone cream apply daily (Patient not taking: Reported on 1/29/2024) 30 g 11     COMPOUNDED NON-CONTROLLED SUBSTANCE (CMPD RX) - PHARMACY TO MIX COMPOUNDED MEDICATION Topical aldactone 5% cream apply daily to face (Patient not taking: Reported on 1/29/2024) 30 g 11     cyanocolbalamin (VITAMIN  B-12) 500 MCG tablet Take 500 mcg by mouth 2 times daily       DARIO 0.1 MG/24HR bi-weekly patch Place 1 patch onto the skin twice a week       drospirenone-ethinyl estradiol (ANDRE) 3-0.02 MG tablet Take 1 tablet by mouth daily (Patient not taking: Reported on 4/24/2023) 84 tablet 0     fluconazole (DIFLUCAN) 100 MG tablet Take 1 tablet at symptom onset; 1 tablet 3 days later if sx still present (Patient not taking: Reported on 4/24/2023) 3 tablet 7     fluconazole (DIFLUCAN) 150 MG tablet Take 1 tablet at symptom onset, 1 tablet 3 days later if symptoms still present (Patient not taking: Reported on 4/29/2024) 2 tablet 3     HYDROcodone-acetaminophen (NORCO) 5-325 MG per tablet Take 1 tablet by mouth every 6 hours as needed for moderate to severe pain 12 tablet 0     Multiple Vitamins-Minerals (ZINC PO) Take 60 mg by mouth daily       Nutritional Supplements (ENSURE PLUS) LIQD Take by mouth 3 times daily (with meals)       Nutritional Supplements (PEPTAMEN 1.5) LIQD Take 4 each by mouth 4 times daily (Patient not taking: Reported on 4/29/2024) 89835 mL 11     Nutritional Supplements (PEPTAMEN 1.5) LIQD 90 mL every hour for 12 hours or 60 mL every hour for 18 hours. Flush tube with 100 mL every four hours, then 30 mL every four hours once adequate oral hydration has been reached. Eat  "protein bars and drink protein shakes for added caloric intake. (Patient not taking: Reported on 4/29/2024) 1000 mL 4     Omega-3 Fatty Acids (FISH OIL OMEGA-3 PO)        ondansetron (ZOFRAN) 8 MG tablet Take 3 tablets (24 mg) by mouth every 8 hours as needed for nausea Place on all labels pt needs an appointment for further refills please schedule. 36 tablet 5     polyethylene glycol (MIRALAX) powder Take 17 g by mouth daily STIR 1 CAPFUL (17GM) IN 8 OZ OF LIQUID AND DRINK (Patient not taking: Reported on 4/24/2023) 527 g 0     POTASSIUM CHLORIDE ER PO Take 20 mEq by mouth 2 times daily       spironolactone (ALDACTONE) 25 MG tablet Take 1 tablet (25 mg) by mouth daily 60 tablet 3     SUMATRIPTAN SUCCINATE PO Take 50 mg by mouth once Take 50 mg (1 tablet) at onset of headache, may repeat in 2 hours if needed       tretinoin (RETIN-A) 0.05 % external cream Apply topically At Bedtime (Patient not taking: Reported on 4/24/2023) 45 g 11     tretinoin (RETIN-A) 0.1 % external cream Apply topically At Bedtime (Patient not taking: Reported on 4/24/2023) 45 g 11     Allergies   Allergen Reactions     Adalimumab Swelling     Facial swelling and just didn't feel good     Aldactone [Spironolactone] Other (See Comments)     Chest pain, GI upset     Chlorhexidine Gluconate      Diazepam      Suicidal thoughts, and major depression  Pt tolerates lorazepam     Erythromycin Nausea     She doesn't like the way she feels when she is on it.     Flagyl [Metronidazole Hcl] Other (See Comments)     headache     Morphine [Fumaric Acid] Other (See Comments)     Pt states \"doesn't work and makes her miserable\"     Oxycodone      Pt reports \"it doesn't work and I just don't like the way I feel when I take it\"     Percocet [Oxycodone-Acetaminophen] Nausea     Pt doesn't like it- causes nausea     Vancomycin      Droperidol Anxiety     Prochlorperazine Anxiety     Risperidone Anxiety     Risperidone Anxiety     Other reaction(s): Nausea Only "     Denies any other skin complaints, in general feels well: Yes  Review of symptoms otherwise negative:Yes    PHYSICAL EXAM:   A&Ox3: Yes   Well developed/well nourished female Yes   Mood appropriate Yes      There were no vitals taken for this visit.  Type 2 skin. Mood appropriate  Alert and Oriented X 3. Well developed, well nourished in no distress.  General appearance: Normal  Head including face: Normal  Eyes: conjunctiva and lids: Normal  Mouth: Lips, teeth, gums: Normal  Neck: Normal  Skin: Scalp and body hair: See below     Comedones Papules/Pustules Cysts Staining Scarring   Face/Neck 1+ 0 0 0 0   Chest 0 0 0 0 0   Back 0 0 0 0 0     Telangiectasias: No Fixed Erythema: No Exoriations: No   Other Physical Exam Findings:  PIH and a few papules on bilateral upper inner thighs  ASSESSMENT & PLAN:     Acne Vulgaris - doing great. advised on diagnosis and treatment options. Discussed use of topical medications and antibiotics.     --Continue azelaic acid QAM  --Continue hyaluronic acid QAM  --Continue kojic acid    2. Folliculitis, ingrown hairs  --Start shaving with the grain of the hair; try to not pluck  --Start Hibiclens daily in the shower as needed  --Start clindamycin lotion daily PRN flares      Pt advised on use and risks including photosensitivity, allergic reactions, GI upset, headaches, nausea, erythema, scaling, vertigo, asthralgias, blood clots:Yes    Follow-up: yearly/PRN sooner  Scribed By: Ivelisse Caputo, MS, PAMirelaC        Again, thank you for allowing me to participate in the care of your patient.        Sincerely,        Ivelisse Caputo PA-C    Electronically signed

## 2025-06-03 DIAGNOSIS — L70.0 ACNE VULGARIS: ICD-10-CM

## 2025-06-03 NOTE — TELEPHONE ENCOUNTER
Requested Prescriptions   Pending Prescriptions Disp Refills    ampicillin (PRINCIPEN) 500 MG capsule 60 capsule 0     Sig: Take 1 capsule (500 mg) by mouth two times daily.       There is no refill protocol information for this order          Last office visit: 4/3/2025 ; last virtual visit: 10/16/2023 with prescribing provider:  Ivelisse Melendez     Future Office Visit:        Antonia Lord   Clinic [facility]    [facility]  [phone]

## 2025-06-04 RX ORDER — AMPICILLIN 500 MG/1
500 CAPSULE ORAL
Qty: 60 CAPSULE | Refills: 0 | OUTPATIENT
Start: 2025-06-04

## 2025-06-04 NOTE — TELEPHONE ENCOUNTER
Rx not on RN refill protocol, sent to provider. Patient has appt in April. Was last seen on 4/25  Maddy Maza RN

## 2025-06-06 NOTE — TELEPHONE ENCOUNTER
Received another refill request for     ampicillin (PRINCIPEN) 500 MG capsule       [facility]   Clinic Station [place]   [facility] [place] [facility]  [phone]

## 2025-06-09 NOTE — TELEPHONE ENCOUNTER
Received another refill request for     ampicillin (PRINCIPEN) 500 MG capsule         [facility]   Clinic Station [place]   [facility] [place] [facility]  [phone]

## 2025-06-12 NOTE — TELEPHONE ENCOUNTER
Received another refill request for     ampicillin (PRINCIPEN) 500 MG capsule            Thank you,  Bhumika Machado  [facility]  [address] [place] [address]  Priority line: [phone] (please do not share number with patient)   Employed by [facility]